# Patient Record
Sex: MALE | Race: WHITE
[De-identification: names, ages, dates, MRNs, and addresses within clinical notes are randomized per-mention and may not be internally consistent; named-entity substitution may affect disease eponyms.]

---

## 2019-02-18 NOTE — HP
DATE OF SURGERY:  02/21/2019



ANTICIPATED PROCEDURE:  Colonoscopy.



HISTORY OF PRESENT ILLNESS: The patient presents for screening, 58 years old. He had one 
about 10 years ago that was negative.  



PAST MEDICAL HISTORY: Hypertension. CVA. Dysphagia.  

ALLERGIES: NONE.

MEDICATIONS:  Multiple. 



SOCIAL HISTORY: Negative.  

FAMILY HISTORY: Negative.



REVIEW OF SYSTEMS: Negative.



PHYSICAL EXAMINATION:  

VITAL SIGNS: Normal.

CHEST:  Clear.

COR: Regular.

ABDOMEN:  No palpable organomegaly or mass.



PLAN: Colonoscopy for screening.

## 2019-02-21 ENCOUNTER — HOSPITAL ENCOUNTER (OUTPATIENT)
Dept: HOSPITAL 33 - SDC | Age: 59
Discharge: HOME | End: 2019-02-21
Attending: SURGERY
Payer: MEDICARE

## 2019-02-21 VITALS — HEART RATE: 76 BPM | SYSTOLIC BLOOD PRESSURE: 141 MMHG | DIASTOLIC BLOOD PRESSURE: 79 MMHG

## 2019-02-21 VITALS — OXYGEN SATURATION: 96 %

## 2019-02-21 DIAGNOSIS — K63.5: ICD-10-CM

## 2019-02-21 DIAGNOSIS — Z12.11: Primary | ICD-10-CM

## 2019-02-21 PROCEDURE — 88305 TISSUE EXAM BY PATHOLOGIST: CPT

## 2019-02-21 NOTE — OP
SURGERY DATE/TIME:   02/21/2019  1142



PREOPERATIVE DIAGNOSIS:     Ten year screening.



POSTOPERATIVE DIAGNOSIS:   One polyp mid sigmoid. 



PROCEDURE:    Colonoscopy complete to cecum with hot polypectomy x1. 



SURGEON:        Ac Martinez M.D.



ANESTHESIA:    MAC - Phillip Rod CRNA. 



COMPLICATIONS:    None.



CONDITION:        Stable.



INDICATION:  A patient requiring evaluation.   



DESCRIPTION OF PROCEDURE: Taken to endoscopy. MAC sedation. Excellent anesthesia present. 
Anal digital examination satisfactory. Prostate satisfactory. Scope advanced to the cecum. 
Base of cecum, ileocecal valve and appendiceal orifice were all normal. Ascending, 
hepatic, transverse, splenic, descending, sigmoid. Mid sigmoid 8 mm polyp taken with hot 
biopsy forceps to extinction. Rectum and anus normal. 



IMPRESSION:  Successful polypectomy x1. 



PLAN: Follow up three years. He can call for report on his polyp in five days.

## 2019-08-10 ENCOUNTER — HOSPITAL ENCOUNTER (OUTPATIENT)
Dept: HOSPITAL 33 - ED | Age: 59
Setting detail: OBSERVATION
LOS: 3 days | Discharge: HOME | End: 2019-08-13
Attending: GENERAL PRACTICE | Admitting: GENERAL PRACTICE
Payer: MEDICARE

## 2019-08-10 DIAGNOSIS — E87.6: ICD-10-CM

## 2019-08-10 DIAGNOSIS — K92.2: Primary | ICD-10-CM

## 2019-08-10 DIAGNOSIS — Z72.0: ICD-10-CM

## 2019-08-10 DIAGNOSIS — K44.9: ICD-10-CM

## 2019-08-10 DIAGNOSIS — R10.33: ICD-10-CM

## 2019-08-10 DIAGNOSIS — K25.9: ICD-10-CM

## 2019-08-10 DIAGNOSIS — I10: ICD-10-CM

## 2019-08-10 DIAGNOSIS — K29.70: ICD-10-CM

## 2019-08-10 DIAGNOSIS — R19.7: ICD-10-CM

## 2019-08-10 DIAGNOSIS — J44.1: ICD-10-CM

## 2019-08-10 DIAGNOSIS — Z86.73: ICD-10-CM

## 2019-08-10 DIAGNOSIS — Z79.899: ICD-10-CM

## 2019-08-10 DIAGNOSIS — F10.929: ICD-10-CM

## 2019-08-10 LAB
ALBUMIN SERPL-MCNC: 1.4 G/DL (ref 3.5–5)
ALP SERPL-CCNC: 29 U/L (ref 38–126)
ALT SERPL-CCNC: 12 U/L (ref 0–50)
AMYLASE SERPL-CCNC: 39 U/L (ref 30–110)
ANION GAP SERPL CALC-SCNC: 5.1 MEQ/L (ref 5–15)
APAP SPEC-MCNC: < 10 UG/ML (ref 10–30)
AST SERPL QL: 13 U/L (ref 17–59)
BASOPHILS # BLD AUTO: 0.03 10*3/UL (ref 0–0.4)
BASOPHILS NFR BLD AUTO: 0.5 % (ref 0–0.4)
BILIRUB BLD-MCNC: 0.2 MG/DL (ref 0.2–1.3)
BUN SERPL-MCNC: 4 MG/DL (ref 9–20)
CALCIUM SPEC-MCNC: 3.5 MG/DL (ref 8.4–10.2)
CHLORIDE SERPL-SCNC: 127 MMOL/L (ref 98–107)
CO2 SERPL-SCNC: 10 MMOL/L (ref 22–30)
CREAT SERPL-MCNC: 0.26 MG/DL (ref 0.66–1.25)
EOSINOPHIL # BLD AUTO: 0.07 10*3/UL (ref 0–0.5)
ETHANOL SERPL-MCNC: 113 MG/DL (ref 0–10)
GLUCOSE SERPL-MCNC: 74 MG/DL (ref 74–106)
GRANULOCYTES # BLD AUTO: 3.66 10*3/UL (ref 1.4–6.9)
HCT VFR BLD AUTO: 32.5 % (ref 42–50)
HGB BLD-MCNC: 11.1 GM/DL (ref 12.5–18)
LIPASE SERPL-CCNC: 76 U/L (ref 23–300)
LYMPHOCYTES # SPEC AUTO: 2.2 10*3/UL (ref 1–4.6)
MCH RBC QN AUTO: 31.4 PG (ref 26–32)
MCHC RBC AUTO-ENTMCNC: 34.2 G/DL (ref 32–36)
MONOCYTES # BLD AUTO: 0.46 10*3/UL (ref 0–1.3)
NEUTROPHILS NFR BLD AUTO: 56.9 % (ref 36–66)
PLATELET # BLD AUTO: 123 K/MM3 (ref 150–450)
POTASSIUM SERPLBLD-SCNC: 1.8 MMOL/L (ref 3.5–5.1)
PROT SERPL-MCNC: 3.2 G/DL (ref 6.3–8.2)
RBC # BLD AUTO: 3.53 M/MM3 (ref 4.1–5.6)
SALICYLATES SERPL-MCNC: < 1 MG/DL (ref 2–20)
SODIUM SERPL-SCNC: 141 MMOL/L (ref 137–145)
WBC # BLD AUTO: 6.4 K/MM3 (ref 4–10.5)

## 2019-08-10 PROCEDURE — 93005 ELECTROCARDIOGRAM TRACING: CPT

## 2019-08-10 PROCEDURE — 82140 ASSAY OF AMMONIA: CPT

## 2019-08-10 PROCEDURE — 83690 ASSAY OF LIPASE: CPT

## 2019-08-10 PROCEDURE — 80053 COMPREHEN METABOLIC PANEL: CPT

## 2019-08-10 PROCEDURE — 82150 ASSAY OF AMYLASE: CPT

## 2019-08-10 PROCEDURE — 90791 PSYCH DIAGNOSTIC EVALUATION: CPT

## 2019-08-10 PROCEDURE — G0480 DRUG TEST DEF 1-7 CLASSES: HCPCS

## 2019-08-10 PROCEDURE — 94762 N-INVAS EAR/PLS OXIMTRY CONT: CPT

## 2019-08-10 PROCEDURE — 99284 EMERGENCY DEPT VISIT MOD MDM: CPT

## 2019-08-10 PROCEDURE — 96360 HYDRATION IV INFUSION INIT: CPT

## 2019-08-10 PROCEDURE — 83735 ASSAY OF MAGNESIUM: CPT

## 2019-08-10 PROCEDURE — 82271 OCCULT BLOOD OTHER SOURCES: CPT

## 2019-08-10 PROCEDURE — 43239 EGD BIOPSY SINGLE/MULTIPLE: CPT

## 2019-08-10 PROCEDURE — 96365 THER/PROPH/DIAG IV INF INIT: CPT

## 2019-08-10 PROCEDURE — 81001 URINALYSIS AUTO W/SCOPE: CPT

## 2019-08-10 PROCEDURE — 87507 IADNA-DNA/RNA PROBE TQ 12-25: CPT

## 2019-08-10 PROCEDURE — 36000 PLACE NEEDLE IN VEIN: CPT

## 2019-08-10 PROCEDURE — 80048 BASIC METABOLIC PNL TOTAL CA: CPT

## 2019-08-10 PROCEDURE — G0378 HOSPITAL OBSERVATION PER HR: HCPCS

## 2019-08-10 PROCEDURE — 93268 ECG RECORD/REVIEW: CPT

## 2019-08-10 PROCEDURE — 83605 ASSAY OF LACTIC ACID: CPT

## 2019-08-10 PROCEDURE — 96374 THER/PROPH/DIAG INJ IV PUSH: CPT

## 2019-08-10 PROCEDURE — 85025 COMPLETE CBC W/AUTO DIFF WBC: CPT

## 2019-08-10 PROCEDURE — 94760 N-INVAS EAR/PLS OXIMETRY 1: CPT

## 2019-08-10 PROCEDURE — 70450 CT HEAD/BRAIN W/O DYE: CPT

## 2019-08-10 PROCEDURE — 94640 AIRWAY INHALATION TREATMENT: CPT

## 2019-08-10 PROCEDURE — 96366 THER/PROPH/DIAG IV INF ADDON: CPT

## 2019-08-10 PROCEDURE — 84484 ASSAY OF TROPONIN QUANT: CPT

## 2019-08-10 PROCEDURE — 36415 COLL VENOUS BLD VENIPUNCTURE: CPT

## 2019-08-10 PROCEDURE — 84132 ASSAY OF SERUM POTASSIUM: CPT

## 2019-08-10 PROCEDURE — 80307 DRUG TEST PRSMV CHEM ANLYZR: CPT

## 2019-08-10 NOTE — ERPHSYRPT
- History of Present Illness


Time Seen by Provider: 08/10/19 23:38


Source: patient, EMS


Exam Limitations: clinical condition


Patient Subjective Stated Complaint: per ems they were called out for pt passed 

out on the floor an unresponsive. states pt was awake and alert when they 

arrived. is intoxicated and vomiting.


Triage Nursing Assessment: pt awake and answers questions approp. alert and 

oriented x3. pt arrive per ambulance and complete assist to stretcher. 

respirations nonlabored. pt vomiting on arrival. abd nontender with bowel 

sounds present x4, speech slurred. severe weakness noted to rt side. pt and ems 

states is his norm after cva.


Physician History: 





pt was passed out after apparent drinking binge today- prior CVA , but pt 

states now no new weakness or symptoms; no focal deficits that are new on exam 

- residula right sided weakness from prior CVA; on blood thinner and will need 

CT head however; 


Timing/Duration: today


Severity: moderate


Associated Symptoms: nausea, vomiting


Allergies/Adverse Reactions: 








No Known Drug Allergies Allergy (Verified 08/10/19 23:16)


 





Home Medications: 








Aspirin  mg*** [Ecotrin 325 MG***] 325 mg PO DAILY 01/24/16 [History]


Acetaminophen [Tylenol Extra Strength] 500 mg PO Q4HPRN PRN 01/11/18 [History]


Atorvastatin Calcium [Lipitor] 80 mg PO DAILY 01/11/18 [History]


Lisinopril 5 mg*** [Zestril 5 MG***] 5 mg PO DAILY 01/11/18 [History]


Sertraline HCl 50 mg** [Zoloft 50 mg Tablet**] 100 mg PO DAILY 01/11/18 [History

]


Tamsulosin HCl 0.4 mg*** [Flomax 0.4 MG***] 0.4 mg PO DAILY 01/11/18 [History]


Baclofen 10 mg*** [Lioresal 10 mg***] 10 mg PO TID 08/10/19 [History]





Hx Tetanus, Diphtheria Vaccination/Date Given: Yes (2018)


Hx Influenza Vaccination/Date Given: No


Hx Pneumococcal Vaccination/Date Given: No


Immunizations Up to Date: Yes





- Review of Systems


Constitutional: No Fever, No Chills


Eyes: No Symptoms


Ears, Nose, & Throat: No Symptoms


Respiratory: No Cough, No Dyspnea


Cardiac: No Chest Pain, No Edema, No Syncope


Abdominal/Gastrointestinal: Nausea, Vomiting, Other (trace positive emesis), No 

Abdominal Pain, No Diarrhea


Genitourinary Symptoms: No Dysuria


Musculoskeletal: No Back Pain, No Neck Pain


Skin: No Rash


Neurological: No Dizziness, No Focal Weakness (old rigth weakness without 

change - none new ), No Sensory Changes


Psychological: Alcohol Abuse


Endocrine: No Symptoms


Hematologic/Lymphatic: No Symptoms


Immunological/Allergic: No Symptoms


All Other Systems: Reviewed and Negative





- Past Medical History


Pertinent Past Medical History: Yes


Neurological History: Stroke


ENT History: No Pertinent History


Cardiac History: High Cholesterol, Hypertension


Respiratory History: No Pertinent History


Endocrine Medical History: No Pertinent History


Musculoskeletal History: Fractures


GI Medical History: No Pertinent History


 History: No Pertinent History


Psycho-Social History: No Pertinent History


Male Reproductive Disorders: Prostate Problems


Other Medical History: REPORTS RHUEMATIC FEVER AND HEPITITIS WHEN HE WAS YOUNG 

BUT OTHERISE NONSIGNIFICANT.





- Past Surgical History


Past Surgical History: Yes


Neuro Surgical History: No Pertinent History


Cardiac: No Pertinent History


Respiratory: No Pertinent History


Gastrointestinal: No Pertinent History


Genitourinary: No Pertinent History


Musculoskeletal: Orthopedic Surgery


Male Surgical History: No Pertinent History


Other Surgical History: Colonoscopy





- Social History


Smoking Status: Current every day smoker


How long have you smoked: 30 years


Exposure to second hand smoke: Yes


Drug Use: none


Patient Lives Alone: Yes





- Nursing Vital Signs


Nursing Vital Signs: 


 Initial Vital Signs











Temperature  98.1 F   08/10/19 22:59


 


Pulse Rate  81   08/10/19 22:59


 


Respiratory Rate  18   08/10/19 22:59


 


Blood Pressure  127/89   08/10/19 22:59


 


O2 Sat by Pulse Oximetry  94 L  08/10/19 22:59








 Pain Scale











Pain Intensity                 0

















- Physical Exam


General Appearance: no apparent distress, alert


Eye Exam: PERRL/EOMI, eyes nml inspection


Ears, Nose, Throat Exam: normal ENT inspection, TMs normal, pharynx normal, 

moist mucous membranes


Neck Exam: normal inspection, non-tender, supple, full range of motion


Respiratory Exam: normal breath sounds, lungs clear, No respiratory distress


Cardiovascular Exam: regular rate/rhythm, normal heart sounds, normal 

peripheral pulses


Gastrointestinal/Abdomen Exam: soft, normal bowel sounds, No tenderness, No mass


Rectal Exam: deferred


Back Exam: normal inspection, normal range of motion, No CVA tenderness, No 

vertebral tenderness


Extremity Exam: normal inspection, normal range of motion, pelvis stable


Neurologic Exam: alert, oriented x 3, cooperative, normal mood/affect, nml 

cerebellar function, nml station & gait, sensation nml, No motor deficits


Skin Exam: normal color, warm, dry, No rash


Lymphatic Exam: No adenopathy


SpO2: 94





- Course


Nursing assessment & vital signs reviewed: Yes


EKG Interpreted by Me: Sinus Rhythm, NORMAL AXIS, NORMAL INTERVALS, Non-

specific ST Changes





- CT Exams


  ** Head


CT Interpretation: Tele-radiologist Report, No/Intracranial Hemorrhag, Old 

Stroke, Other


Ordered Tests: 


 Active Orders 24 hr











 Category Date Time Status


 


 Clean Catch Urine Specimen STAT Care  08/10/19 23:02 Active


 


 EKG-ER Only STAT Care  08/10/19 23:02 Active


 


 IV Insertion STAT Care  08/10/19 23:09 Active


 


 Pulse Oximetry (ED) STAT Care  08/10/19 23:02 Active


 


 HEAD WITHOUT CONTRAST [CT] Stat Exams  08/10/19 23:01 Taken


 


 ACETAMINOPHEN Stat Lab  08/10/19 23:13 Completed


 


 AMYLASE Stat Lab  08/10/19 23:13 Completed


 


 CBC W DIFF Stat Lab  08/10/19 23:13 Completed


 


 CMP Stat Lab  08/10/19 23:13 Completed


 


 ETHYL ALCOHOL Stat Lab  08/10/19 23:13 Completed


 


 LIPASE Stat Lab  08/10/19 23:13 Completed


 


 Lactic Acid Stat Lab  08/10/19 23:11 Completed


 


 OCCULT BLOOD, EMESIS Stat Lab  08/10/19 23:09 Completed


 


 SALICYLATE Stat Lab  08/10/19 23:13 Completed


 


 TROPONIN Q3H Lab  08/10/19 23:13 Completed


 


 TROPONIN Q3H Lab  08/11/19 02:15 Ordered


 


 TROPONIN Q3H Lab  08/11/19 05:15 Ordered


 


 TROPONIN Q3H Lab  08/11/19 08:15 Ordered


 


 TROPONIN Q3H Lab  08/11/19 11:15 Ordered


 


 UA W/RFX UR CULTURE Stat Lab  08/11/19 02:12 Received


 


 Urine Triage Profile Stat Lab  08/11/19 02:12 Received








Medication Summary











Generic Name Dose Route Start Last Admin





  Trade Name Freq  PRN Reason Stop Dose Admin


 


Potassium Chloride  20 meq in 100 mls @ 50 mls/hr  08/10/19 23:45  08/11/19 00:

36





  Potassium Chloride 20 Meq In Water 100ml  IV  08/11/19 03:44  50 mls/hr





  Q2H ROSA   Administration





     





     





     





     














Discontinued Medications














Generic Name Dose Route Start Last Admin





  Trade Name Brianna  PRN Reason Stop Dose Admin


 


Famotidine  20 mg  08/10/19 23:05  08/10/19 23:14





  Pepcid 20 Mg Vial***  IV  08/10/19 23:06  20 mg





  STAT ONE   Administration





     





     





     





     


 


Famotidine  Confirm  08/10/19 23:10  





  Pepcid 20 Mg Vial***  Administered  08/10/19 23:11  





  Dose   





  20 mg   





  IV   





  .STK-MED ONE   





     





     





     





     


 


Sodium Chloride  1,000 mls @ 999 mls/hr  08/10/19 23:02  08/11/19 00:40





  Sodium Chloride 0.9% 1000 Ml  IV  08/11/19 00:02  Infused





  .Q1H1M STA   Infusion





     





     





     





     


 


Sodium Chloride  Confirm  08/10/19 23:10  





  Sodium Chloride 0.9% 1000 Ml  Administered  08/10/19 23:11  





  Dose   





  1,000 mls @ ud   





  .ROUTE   





  .STK-MED ONE   





     





     





     





     


 


Sodium Chloride  Confirm  08/11/19 00:28  





  Sodium Chloride 0.9% 1000 Ml  Administered  08/11/19 00:29  





  Dose   





  1,000 mls @ ud   





  .ROUTE   





  .STK-MED ONE   





     





     





     





     











Lab/Rad Data: 


 Laboratory Result Diagrams





 08/10/19 23:13 





 08/10/19 23:13 





 Laboratory Results











  08/10/19 08/10/19 08/10/19 Range/Units





  23:13 23:13 23:13 


 


WBC    6.4  (4.0-10.5)  K/mm3


 


RBC    3.53 L  (4.1-5.6)  M/mm3


 


Hgb    11.1 L  (12.5-18.0)  gm/dl


 


Hct    32.5 L  (42-50)  %


 


MCV    92.1  ()  fl


 


MCH    31.4  (26-32)  pg


 


MCHC    34.2  (32-36)  g/dl


 


RDW    14.5 H  (11.5-14.0)  %


 


Plt Count    123 L  (150-450)  K/mm3


 


MPV    10.8 H  (6-9.5)  fl


 


Gran %    56.9  (36.0-66.0)  %


 


Eos # (Auto)    0.07  (0-0.5)  


 


Absolute Lymphs (auto)    2.20  (1.0-4.6)  


 


Absolute Monos (auto)    0.46  (0.0-1.3)  


 


Lymphocytes %    34.3  (24.0-44.0)  %


 


Monocytes %    7.2  (0.0-12.0)  %


 


Eosinophils %    1.1  (0.00-5.0)  %


 


Basophils %    0.5  (0.0-0.4)  %


 


Absolute Granulocytes    3.66  (1.4-6.9)  


 


Basophils #    0.03  (0-0.4)  


 


Sodium   141   (137-145)  mmol/L


 


Potassium   1.8 L*   (3.5-5.1)  mmol/L


 


Chloride   127 H   ()  mmol/L


 


Carbon Dioxide   10 L*   (22-30)  mmol/L


 


Anion Gap   5.1   (5-15)  MEQ/L


 


BUN   4 L   (9-20)  mg/dL


 


Creatinine   0.26 L   (0.66-1.25)  mg/dL


 


Estimated GFR   > 60.0   ML/MIN


 


Glucose   74   ()  mg/dL


 


Lactic Acid     (0.4-2.0)  


 


Calcium   3.5 L*   (8.4-10.2)  mg/dL


 


Total Bilirubin   0.20   (0.2-1.3)  mg/dL


 


AST   13 L   (17-59)  U/L


 


ALT   12   (0-50)  U/L


 


Alkaline Phosphatase   29 L   ()  U/L


 


Troponin I  < 0.012    (0.000-0.034)  ng/mL


 


Serum Total Protein   3.2 L   (6.3-8.2)  g/dL


 


Albumin   1.4 L   (3.5-5.0)  g/dL


 


Amylase   39   ()  U/L


 


Lipase   76   ()  U/L


 


Emesis for Blood     (Negative)  


 


Salicylates   < 1.0 L   (2-20)  mg/dL


 


Acetaminophen   < 10 L   (10-30)  ug/ml


 


Ethyl Alcohol   113 H   (0-10)  mg/dL














  08/10/19 08/10/19 Range/Units





  23:11 23:09 


 


WBC    (4.0-10.5)  K/mm3


 


RBC    (4.1-5.6)  M/mm3


 


Hgb    (12.5-18.0)  gm/dl


 


Hct    (42-50)  %


 


MCV    ()  fl


 


MCH    (26-32)  pg


 


MCHC    (32-36)  g/dl


 


RDW    (11.5-14.0)  %


 


Plt Count    (150-450)  K/mm3


 


MPV    (6-9.5)  fl


 


Gran %    (36.0-66.0)  %


 


Eos # (Auto)    (0-0.5)  


 


Absolute Lymphs (auto)    (1.0-4.6)  


 


Absolute Monos (auto)    (0.0-1.3)  


 


Lymphocytes %    (24.0-44.0)  %


 


Monocytes %    (0.0-12.0)  %


 


Eosinophils %    (0.00-5.0)  %


 


Basophils %    (0.0-0.4)  %


 


Absolute Granulocytes    (1.4-6.9)  


 


Basophils #    (0-0.4)  


 


Sodium    (137-145)  mmol/L


 


Potassium    (3.5-5.1)  mmol/L


 


Chloride    ()  mmol/L


 


Carbon Dioxide    (22-30)  mmol/L


 


Anion Gap    (5-15)  MEQ/L


 


BUN    (9-20)  mg/dL


 


Creatinine    (0.66-1.25)  mg/dL


 


Estimated GFR    ML/MIN


 


Glucose    ()  mg/dL


 


Lactic Acid  1.7   (0.4-2.0)  


 


Calcium    (8.4-10.2)  mg/dL


 


Total Bilirubin    (0.2-1.3)  mg/dL


 


AST    (17-59)  U/L


 


ALT    (0-50)  U/L


 


Alkaline Phosphatase    ()  U/L


 


Troponin I    (0.000-0.034)  ng/mL


 


Serum Total Protein    (6.3-8.2)  g/dL


 


Albumin    (3.5-5.0)  g/dL


 


Amylase    ()  U/L


 


Lipase    ()  U/L


 


Emesis for Blood   POSITIVE  (Negative)  


 


Salicylates    (2-20)  mg/dL


 


Acetaminophen    (10-30)  ug/ml


 


Ethyl Alcohol    (0-10)  mg/dL














- Progress


Progress: improved, re-examined


Progress Note: 





08/11/19 02:13


discussed with Dr. Nunes and pt and all agree best to come in and have K 

restored ; also to control vomiting and monitor for GI bleed


Discussed with : Manju


Will see patient in: hospital (observation)


Counseled pt/family regarding: lab results, diagnosis, need for follow-up, rad 

results





- Departure


Departure Disposition: Observation


Clinical Impression: 


 Hypokalemia due to excessive gastrointestinal loss of potassium, minor GI bleed

, Alcohol intoxication, old CVA





Condition: Good


Critical Care Time: No


Referrals: 


GUERA FIELDS [Primary Care Provider] -

## 2019-08-11 LAB
AMPHETAMINES UR QL: NEGATIVE
BARBITURATES UR QL: NEGATIVE
BENZODIAZ UR QL SCN: NEGATIVE
COCAINE UR QL SCN: NEGATIVE
GLUCOSE UR-MCNC: NEGATIVE MG/DL
METHADONE UR QL: NEGATIVE
OPIATES UR QL: NEGATIVE
PCP UR QL CFM>20 NG/ML: NEGATIVE
PROT UR STRIP-MCNC: NEGATIVE MG/DL
RBC #/AREA URNS HPF: (no result) /HPF (ref 0–2)
THC UR QL SCN: POSITIVE
WBC #/AREA URNS HPF: (no result) /HPF (ref 0–5)

## 2019-08-11 RX ADMIN — POTASSIUM CHLORIDE SCH MLS/HR: 14.9 INJECTION, SOLUTION INTRAVENOUS at 00:36

## 2019-08-11 RX ADMIN — SERTRALINE SCH MG: 50 TABLET, FILM COATED ORAL at 11:28

## 2019-08-11 RX ADMIN — PANTOPRAZOLE SODIUM SCH MG: 40 INJECTION, POWDER, FOR SOLUTION INTRAVENOUS at 10:50

## 2019-08-11 RX ADMIN — TAMSULOSIN HYDROCHLORIDE SCH MG: 0.4 CAPSULE ORAL at 11:29

## 2019-08-11 RX ADMIN — FAMOTIDINE SCH MG: 10 INJECTION INTRAVENOUS at 21:19

## 2019-08-11 RX ADMIN — POTASSIUM CHLORIDE SCH MLS/HR: 14.9 INJECTION, SOLUTION INTRAVENOUS at 02:24

## 2019-08-11 RX ADMIN — AZITHROMYCIN DIHYDRATE SCH MG: 250 TABLET, FILM COATED ORAL at 13:06

## 2019-08-11 RX ADMIN — FAMOTIDINE SCH MG: 10 INJECTION INTRAVENOUS at 10:48

## 2019-08-11 RX ADMIN — CEFTRIAXONE SCH MLS/HR: 1 INJECTION, SOLUTION INTRAVENOUS at 13:06

## 2019-08-11 RX ADMIN — SIMVASTATIN SCH MG: 20 TABLET, FILM COATED ORAL at 11:28

## 2019-08-11 RX ADMIN — LISINOPRIL SCH MG: 5 TABLET ORAL at 11:29

## 2019-08-11 NOTE — XRAY
Indication: Loss of consciousness with fall.



Multiple contiguous axial images obtained through the head without contrast.



Comparison: January 24, 2016.



Stable large left frontoparietal old infarct.  No acute intracranial

hemorrhage, hydrocephalus, or mass effect.  Bony calvarium intact.  Minimal

mucosal thickening both ethmoid sinuses, left greater than right.  Mastoid air

cells are clear.



Impression:

1.  Stable old left frontoparietal infarct.

2.  No acute intracranial abnormalities.

3.  Minimal paranasal sinus disease.



Comment: Preliminary interpretation was made by [facility].  No discrepancy.



CTDI 45.41

## 2019-08-11 NOTE — PCM.HP
History of Present Illness





- Chief Complaint


Chief Complaint: Mild GI bleed w/vomiting, alcohol overuse, hypokalemia


History of Present Illness: 


 is a very pleasant 58 year old male pt of Dr. Sawant with PMHx CVA, 

HTN, and tobacco abuse who was brought to ER last night by EMS.  Per the ER note

, they were called for an unresponsive pt.  He was awake however when they came 

to evaluate him.  He was found intoxicated and vomiting.  He also vomited in 

the ER and his brown vomitus was tested and found to be heme-positive.  His 

alcohol level was 110 (nl 0-10) and his initial potassium was 1.8 (now 4.3).  

His platelets in ER were 123. Hgb 11.1. 





Pt tells me he remembers last night, that he "fell down" after drinking 4-5 

"tall boys" (larger sized beers).  He said he drinks about once a week but last 

night drank more than usual.


He denies vomiting since coming to med surg.  He states he's been sick the past 

3d with vomiting and diarrhea, unsure about fever.  He c/o periumbilical abd 

pain, 7-8/10, non radiating, which he can't characterize.  It is constant.  He 

took nyquil with some relief.





This morning he is feeling better. 





He had a 10 year screening colonoscopy in March 2019 by Dr. Martinez, which 

resulted in polypectomy x 1.





- Review of Systems


Constitutional: Fatigue, No Fever (pt is unsure)


Respiratory: Cough (prod x 3-4d), No Short Of Breath


Cardiac: Edema (RLE edema chronic), No Chest Pain


Abdominal/Gastrointestinal: Abdominal Pain, Vomiting, Diarrhea (x3d), No 

Hematochezia, No Melena


Genitourinary Symptoms: No Dysuria, No Hematuria


Neurological: Dizziness (occ), Other (syncope)


Psychological: Depression (chronic d/t CVA with R sided deficit), No Anxiety, 

No Suicidal Ideations, No Homicidal Ideations


All Other Systems: Reviewed and Negative





Medications & Allergies


Home Medications: 


 Home Medication List





Atorvastatin Calcium [Lipitor] 80 mg PO DAILY 01/11/18 [History Confirmed 08/10/

19]


Lisinopril 5 mg*** [Zestril 5 MG***] 5 mg PO DAILY 01/11/18 [History Confirmed 

08/10/19]


Sertraline HCl 50 mg** [Zoloft 50 mg Tablet**] 100 mg PO DAILY 01/11/18 [

History Confirmed 08/10/19]


Tamsulosin HCl 0.4 mg*** [Flomax 0.4 MG***] 0.4 mg PO DAILY 01/11/18 [History 

Confirmed 08/10/19]


Baclofen 10 mg*** [Lioresal 10 mg***] 10 mg PO TID 08/10/19 [History Confirmed 

08/10/19]








Allergies/Adverse Reactions: 


 Allergies











Allergy/AdvReac Type Severity Reaction Status Date / Time


 


No Known Drug Allergies Allergy   Verified 08/10/19 23:16














- Past Medical History


Past Medical History: Yes


Neurological History: Stroke


ENT History: No Pertinent History


Cardiac History: High Cholesterol, Hypertension


Respiratory History: No Pertinent History


Endocrine Medical History: No Pertinent History


Musculoskelatal History: Fractures


GI Medical History: No Pertinent History


 History: No Pertinent History


Pyscho-Social History: No Pertinent History


Male Reproductive Disorders: Prostate Problems


Comment: REPORTS RHUEMATIC FEVER AND HEPITITIS WHEN HE WAS YOUNG BUT OTHERISE 

NONSIGNIFICANT.





- Past Surgical History


Past Surgical History: Yes


Neuro Surgical History: No Pertinent History


Cardiac History: No Pertinent History


Respiratory Surgery: No Pertinent History


GI Surgical History: No Pertinent History


Genitourinary Surgical Hx: No Pertinent History


Musculskeletal Surgical Hx: Orthopedic Surgery


Male Surgical History: No Pertinent History


Other Surgical History: Colonoscopy





- Social History


Smoking Status: Current every day smoker


How long have you smoked: 30 years


Exposure to second hand smoke: Yes


Alcohol: Occasionally, Weekly


Drug Use: marijuana





- Physical Exam


Vital Signs: 


 Vital Signs - 24 hr











  Temp Pulse Resp BP Pulse Ox


 


 08/11/19 12:00  98.3 F  93 H  20  120/78  94 L


 


 08/11/19 08:00  98.2 F  93 H  20  118/74  93 L


 


 08/11/19 07:00   101 H  16   95


 


 08/11/19 05:28  97.5 F  84  20  117/77  95


 


 08/11/19 05:15      95


 


 08/11/19 05:07   88  20   95


 


 08/11/19 04:25      95


 


 08/11/19 04:04   94 H  18  101/77  95


 


 08/11/19 03:04   91 H  16  124/82  94 L


 


 08/11/19 02:17      94 L


 


 08/10/19 23:09      94 L


 


 08/10/19 22:59  98.1 F  81  18  127/89  94 L











General Appearance: no apparent distress, alert, obese


Neurologic Exam: oriented x 3, cooperative, normal mood/affect


Eye Exam: eyes nml inspection


Ears, Nose, Throat Exam: moist mucous membranes


Neck Exam: normal inspection, non-tender, No lymphadenopathy


Respiratory Exam: diminished breath sounds, prolonged expirations, No crackles/

rales, No rhonchi, No wheezing


Cardiovascular Exam: normal heart sounds, irregular, No murmur


Gastrointestinal/Abdomen Exam: soft, tenderness (suprapubic), No normal bowel 

sounds (hyperactive), No mass, No guarding, No rebound


Back Exam: normal inspection, No rash


Extremity Exam: No pedal edema, No swelling


Skin Exam: warm, dry, other (face with generalized erythema), No rash





Results





- Labs


Lab/Micro Results: 


 Lab Results-Last 24 Hours











  08/10/19 08/10/19 08/10/19 Range/Units





  23:09 23:11 23:13 


 


WBC    6.4  (4.0-10.5)  K/mm3


 


RBC    3.53 L  (4.1-5.6)  M/mm3


 


Hgb    11.1 L  (12.5-18.0)  gm/dl


 


Hct    32.5 L  (42-50)  %


 


MCV    92.1  ()  fl


 


MCH    31.4  (26-32)  pg


 


MCHC    34.2  (32-36)  g/dl


 


RDW    14.5 H  (11.5-14.0)  %


 


Plt Count    123 L  (150-450)  K/mm3


 


MPV    10.8 H  (6-9.5)  fl


 


Gran %    56.9  (36.0-66.0)  %


 


Eos # (Auto)    0.07  (0-0.5)  


 


Absolute Lymphs (auto)    2.20  (1.0-4.6)  


 


Absolute Monos (auto)    0.46  (0.0-1.3)  


 


Lymphocytes %    34.3  (24.0-44.0)  %


 


Monocytes %    7.2  (0.0-12.0)  %


 


Eosinophils %    1.1  (0.00-5.0)  %


 


Basophils %    0.5  (0.0-0.4)  %


 


Absolute Granulocytes    3.66  (1.4-6.9)  


 


Basophils #    0.03  (0-0.4)  


 


Sodium     (137-145)  mmol/L


 


Potassium     (3.5-5.1)  mmol/L


 


Chloride     ()  mmol/L


 


Carbon Dioxide     (22-30)  mmol/L


 


Anion Gap     (5-15)  MEQ/L


 


BUN     (9-20)  mg/dL


 


Creatinine     (0.66-1.25)  mg/dL


 


Estimated GFR     ML/MIN


 


Glucose     ()  mg/dL


 


Lactic Acid   1.7   (0.4-2.0)  


 


Calcium     (8.4-10.2)  mg/dL


 


Total Bilirubin     (0.2-1.3)  mg/dL


 


AST     (17-59)  U/L


 


ALT     (0-50)  U/L


 


Alkaline Phosphatase     ()  U/L


 


Troponin I     (0.000-0.034)  ng/mL


 


Serum Total Protein     (6.3-8.2)  g/dL


 


Albumin     (3.5-5.0)  g/dL


 


Amylase     ()  U/L


 


Lipase     ()  U/L


 


Urine Color     (YELLOW)  


 


Urine Appearance     (CLEAR)  


 


Urine pH     (5-6)  


 


Ur Specific Gravity     (1.005-1.025)  


 


Urine Protein     (Negative)  


 


Urine Ketones     (NEGATIVE)  


 


Urine Blood     (0-5)  Blake/ul


 


Urine Nitrite     (NEGATIVE)  


 


Urine Bilirubin     (NEGATIVE)  


 


Urine Urobilinogen     (0-1)  mg/dL


 


Ur Leukocyte Esterase     (NEGATIVE)  


 


Urine WBC (Auto)     (0-5)  /HPF


 


Urine RBC (Auto)     (0-2)  /HPF


 


U Hyaline Cast (Auto)     (0-2)  /LPF


 


U Epithel Cells (Auto)     (FEW)  /HPF


 


Urine Bacteria (Auto)     (NEGATIVE)  /HPF


 


Other Casts (Auto)     (NEGATIVE)  /LPF


 


Urine Mucus (Auto)     (NEGATIVE)  /HPF


 


Urine Culture Reflexed     (NO)  


 


Urine Glucose     (NEGATIVE)  mg/dL


 


Emesis for Blood  POSITIVE    (Negative)  


 


Salicylates     (2-20)  mg/dL


 


Urine Opiates Level     (NEGATIVE)  


 


Ur Methadone     (NEGATIVE)  


 


Acetaminophen     (10-30)  ug/ml


 


Urine Barbiturates     (NEGATIVE)  


 


Ur Phencyclidine (PCP)     (NEGATIVE)  


 


Urine Amphetamine     (NEGATIVE)  


 


U Benzodiazepine Level     (NEGATIVE)  


 


Urine Cocaine     (NEGATIVE)  


 


Urine Marijuana (THC)     (NEGATIVE)  


 


Ethyl Alcohol     (0-10)  mg/dL














  08/10/19 08/10/19 08/11/19 Range/Units





  23:13 23:13 02:12 


 


WBC     (4.0-10.5)  K/mm3


 


RBC     (4.1-5.6)  M/mm3


 


Hgb     (12.5-18.0)  gm/dl


 


Hct     (42-50)  %


 


MCV     ()  fl


 


MCH     (26-32)  pg


 


MCHC     (32-36)  g/dl


 


RDW     (11.5-14.0)  %


 


Plt Count     (150-450)  K/mm3


 


MPV     (6-9.5)  fl


 


Gran %     (36.0-66.0)  %


 


Eos # (Auto)     (0-0.5)  


 


Absolute Lymphs (auto)     (1.0-4.6)  


 


Absolute Monos (auto)     (0.0-1.3)  


 


Lymphocytes %     (24.0-44.0)  %


 


Monocytes %     (0.0-12.0)  %


 


Eosinophils %     (0.00-5.0)  %


 


Basophils %     (0.0-0.4)  %


 


Absolute Granulocytes     (1.4-6.9)  


 


Basophils #     (0-0.4)  


 


Sodium  141    (137-145)  mmol/L


 


Potassium  1.8 L*    (3.5-5.1)  mmol/L


 


Chloride  127 H    ()  mmol/L


 


Carbon Dioxide  10 L*    (22-30)  mmol/L


 


Anion Gap  5.1    (5-15)  MEQ/L


 


BUN  4 L    (9-20)  mg/dL


 


Creatinine  0.26 L    (0.66-1.25)  mg/dL


 


Estimated GFR  > 60.0    ML/MIN


 


Glucose  74    ()  mg/dL


 


Lactic Acid     (0.4-2.0)  


 


Calcium  3.5 L*    (8.4-10.2)  mg/dL


 


Total Bilirubin  0.20    (0.2-1.3)  mg/dL


 


AST  13 L    (17-59)  U/L


 


ALT  12    (0-50)  U/L


 


Alkaline Phosphatase  29 L    ()  U/L


 


Troponin I   < 0.012   (0.000-0.034)  ng/mL


 


Serum Total Protein  3.2 L    (6.3-8.2)  g/dL


 


Albumin  1.4 L    (3.5-5.0)  g/dL


 


Amylase  39    ()  U/L


 


Lipase  76    ()  U/L


 


Urine Color    STRAW  (YELLOW)  


 


Urine Appearance    CLEAR  (CLEAR)  


 


Urine pH    5.0  (5-6)  


 


Ur Specific Gravity    1.005  (1.005-1.025)  


 


Urine Protein    NEGATIVE  (Negative)  


 


Urine Ketones    NEGATIVE  (NEGATIVE)  


 


Urine Blood    NEGATIVE  (0-5)  Blake/ul


 


Urine Nitrite    NEGATIVE  (NEGATIVE)  


 


Urine Bilirubin    NEGATIVE  (NEGATIVE)  


 


Urine Urobilinogen    NEGATIVE  (0-1)  mg/dL


 


Ur Leukocyte Esterase    NEGATIVE  (NEGATIVE)  


 


Urine WBC (Auto)    NONE  (0-5)  /HPF


 


Urine RBC (Auto)    NONE SEEN  (0-2)  /HPF


 


U Hyaline Cast (Auto)    0-2  (0-2)  /LPF


 


U Epithel Cells (Auto)    NONE  (FEW)  /HPF


 


Urine Bacteria (Auto)    NONE SEEN  (NEGATIVE)  /HPF


 


Other Casts (Auto)    NEGATIVE  (NEGATIVE)  /LPF


 


Urine Mucus (Auto)    SLIGHT  (NEGATIVE)  /HPF


 


Urine Culture Reflexed    NO  (NO)  


 


Urine Glucose    NEGATIVE  (NEGATIVE)  mg/dL


 


Emesis for Blood     (Negative)  


 


Salicylates  < 1.0 L    (2-20)  mg/dL


 


Urine Opiates Level     (NEGATIVE)  


 


Ur Methadone     (NEGATIVE)  


 


Acetaminophen  < 10 L    (10-30)  ug/ml


 


Urine Barbiturates     (NEGATIVE)  


 


Ur Phencyclidine (PCP)     (NEGATIVE)  


 


Urine Amphetamine     (NEGATIVE)  


 


U Benzodiazepine Level     (NEGATIVE)  


 


Urine Cocaine     (NEGATIVE)  


 


Urine Marijuana (THC)     (NEGATIVE)  


 


Ethyl Alcohol  113 H    (0-10)  mg/dL














  08/11/19 08/11/19 08/11/19 Range/Units





  02:12 02:27 06:30 


 


WBC     (4.0-10.5)  K/mm3


 


RBC     (4.1-5.6)  M/mm3


 


Hgb     (12.5-18.0)  gm/dl


 


Hct     (42-50)  %


 


MCV     ()  fl


 


MCH     (26-32)  pg


 


MCHC     (32-36)  g/dl


 


RDW     (11.5-14.0)  %


 


Plt Count     (150-450)  K/mm3


 


MPV     (6-9.5)  fl


 


Gran %     (36.0-66.0)  %


 


Eos # (Auto)     (0-0.5)  


 


Absolute Lymphs (auto)     (1.0-4.6)  


 


Absolute Monos (auto)     (0.0-1.3)  


 


Lymphocytes %     (24.0-44.0)  %


 


Monocytes %     (0.0-12.0)  %


 


Eosinophils %     (0.00-5.0)  %


 


Basophils %     (0.0-0.4)  %


 


Absolute Granulocytes     (1.4-6.9)  


 


Basophils #     (0-0.4)  


 


Sodium     (137-145)  mmol/L


 


Potassium     (3.5-5.1)  mmol/L


 


Chloride     ()  mmol/L


 


Carbon Dioxide     (22-30)  mmol/L


 


Anion Gap     (5-15)  MEQ/L


 


BUN     (9-20)  mg/dL


 


Creatinine     (0.66-1.25)  mg/dL


 


Estimated GFR     ML/MIN


 


Glucose     ()  mg/dL


 


Lactic Acid     (0.4-2.0)  


 


Calcium     (8.4-10.2)  mg/dL


 


Total Bilirubin     (0.2-1.3)  mg/dL


 


AST     (17-59)  U/L


 


ALT     (0-50)  U/L


 


Alkaline Phosphatase     ()  U/L


 


Troponin I   < 0.012  < 0.012  (0.000-0.034)  ng/mL


 


Serum Total Protein     (6.3-8.2)  g/dL


 


Albumin     (3.5-5.0)  g/dL


 


Amylase     ()  U/L


 


Lipase     ()  U/L


 


Urine Color     (YELLOW)  


 


Urine Appearance     (CLEAR)  


 


Urine pH     (5-6)  


 


Ur Specific Gravity     (1.005-1.025)  


 


Urine Protein     (Negative)  


 


Urine Ketones     (NEGATIVE)  


 


Urine Blood     (0-5)  Blake/ul


 


Urine Nitrite     (NEGATIVE)  


 


Urine Bilirubin     (NEGATIVE)  


 


Urine Urobilinogen     (0-1)  mg/dL


 


Ur Leukocyte Esterase     (NEGATIVE)  


 


Urine WBC (Auto)     (0-5)  /HPF


 


Urine RBC (Auto)     (0-2)  /HPF


 


U Hyaline Cast (Auto)     (0-2)  /LPF


 


U Epithel Cells (Auto)     (FEW)  /HPF


 


Urine Bacteria (Auto)     (NEGATIVE)  /HPF


 


Other Casts (Auto)     (NEGATIVE)  /LPF


 


Urine Mucus (Auto)     (NEGATIVE)  /HPF


 


Urine Culture Reflexed     (NO)  


 


Urine Glucose     (NEGATIVE)  mg/dL


 


Emesis for Blood     (Negative)  


 


Salicylates     (2-20)  mg/dL


 


Urine Opiates Level  NEGATIVE    (NEGATIVE)  


 


Ur Methadone  NEGATIVE    (NEGATIVE)  


 


Acetaminophen     (10-30)  ug/ml


 


Urine Barbiturates  NEGATIVE    (NEGATIVE)  


 


Ur Phencyclidine (PCP)  NEGATIVE    (NEGATIVE)  


 


Urine Amphetamine  NEGATIVE    (NEGATIVE)  


 


U Benzodiazepine Level  NEGATIVE    (NEGATIVE)  


 


Urine Cocaine  NEGATIVE    (NEGATIVE)  


 


Urine Marijuana (THC)  POSITIVE    (NEGATIVE)  


 


Ethyl Alcohol     (0-10)  mg/dL














  08/11/19 08/11/19 08/11/19 Range/Units





  06:30 08:40 11:40 


 


WBC     (4.0-10.5)  K/mm3


 


RBC     (4.1-5.6)  M/mm3


 


Hgb     (12.5-18.0)  gm/dl


 


Hct     (42-50)  %


 


MCV     ()  fl


 


MCH     (26-32)  pg


 


MCHC     (32-36)  g/dl


 


RDW     (11.5-14.0)  %


 


Plt Count     (150-450)  K/mm3


 


MPV     (6-9.5)  fl


 


Gran %     (36.0-66.0)  %


 


Eos # (Auto)     (0-0.5)  


 


Absolute Lymphs (auto)     (1.0-4.6)  


 


Absolute Monos (auto)     (0.0-1.3)  


 


Lymphocytes %     (24.0-44.0)  %


 


Monocytes %     (0.0-12.0)  %


 


Eosinophils %     (0.00-5.0)  %


 


Basophils %     (0.0-0.4)  %


 


Absolute Granulocytes     (1.4-6.9)  


 


Basophils #     (0-0.4)  


 


Sodium     (137-145)  mmol/L


 


Potassium  4.3  D    (3.5-5.1)  mmol/L


 


Chloride     ()  mmol/L


 


Carbon Dioxide     (22-30)  mmol/L


 


Anion Gap     (5-15)  MEQ/L


 


BUN     (9-20)  mg/dL


 


Creatinine     (0.66-1.25)  mg/dL


 


Estimated GFR     ML/MIN


 


Glucose     ()  mg/dL


 


Lactic Acid     (0.4-2.0)  


 


Calcium     (8.4-10.2)  mg/dL


 


Total Bilirubin     (0.2-1.3)  mg/dL


 


AST     (17-59)  U/L


 


ALT     (0-50)  U/L


 


Alkaline Phosphatase     ()  U/L


 


Troponin I   < 0.012  < 0.012  (0.000-0.034)  ng/mL


 


Serum Total Protein     (6.3-8.2)  g/dL


 


Albumin     (3.5-5.0)  g/dL


 


Amylase     ()  U/L


 


Lipase     ()  U/L


 


Urine Color     (YELLOW)  


 


Urine Appearance     (CLEAR)  


 


Urine pH     (5-6)  


 


Ur Specific Gravity     (1.005-1.025)  


 


Urine Protein     (Negative)  


 


Urine Ketones     (NEGATIVE)  


 


Urine Blood     (0-5)  Blake/ul


 


Urine Nitrite     (NEGATIVE)  


 


Urine Bilirubin     (NEGATIVE)  


 


Urine Urobilinogen     (0-1)  mg/dL


 


Ur Leukocyte Esterase     (NEGATIVE)  


 


Urine WBC (Auto)     (0-5)  /HPF


 


Urine RBC (Auto)     (0-2)  /HPF


 


U Hyaline Cast (Auto)     (0-2)  /LPF


 


U Epithel Cells (Auto)     (FEW)  /HPF


 


Urine Bacteria (Auto)     (NEGATIVE)  /HPF


 


Other Casts (Auto)     (NEGATIVE)  /LPF


 


Urine Mucus (Auto)     (NEGATIVE)  /HPF


 


Urine Culture Reflexed     (NO)  


 


Urine Glucose     (NEGATIVE)  mg/dL


 


Emesis for Blood     (Negative)  


 


Salicylates     (2-20)  mg/dL


 


Urine Opiates Level     (NEGATIVE)  


 


Ur Methadone     (NEGATIVE)  


 


Acetaminophen     (10-30)  ug/ml


 


Urine Barbiturates     (NEGATIVE)  


 


Ur Phencyclidine (PCP)     (NEGATIVE)  


 


Urine Amphetamine     (NEGATIVE)  


 


U Benzodiazepine Level     (NEGATIVE)  


 


Urine Cocaine     (NEGATIVE)  


 


Urine Marijuana (THC)     (NEGATIVE)  


 


Ethyl Alcohol     (0-10)  mg/dL














  08/11/19 Range/Units





  12:12 


 


WBC   (4.0-10.5)  K/mm3


 


RBC   (4.1-5.6)  M/mm3


 


Hgb   (12.5-18.0)  gm/dl


 


Hct   (42-50)  %


 


MCV   ()  fl


 


MCH   (26-32)  pg


 


MCHC   (32-36)  g/dl


 


RDW   (11.5-14.0)  %


 


Plt Count   (150-450)  K/mm3


 


MPV   (6-9.5)  fl


 


Gran %   (36.0-66.0)  %


 


Eos # (Auto)   (0-0.5)  


 


Absolute Lymphs (auto)   (1.0-4.6)  


 


Absolute Monos (auto)   (0.0-1.3)  


 


Lymphocytes %   (24.0-44.0)  %


 


Monocytes %   (0.0-12.0)  %


 


Eosinophils %   (0.00-5.0)  %


 


Basophils %   (0.0-0.4)  %


 


Absolute Granulocytes   (1.4-6.9)  


 


Basophils #   (0-0.4)  


 


Sodium   (137-145)  mmol/L


 


Potassium   (3.5-5.1)  mmol/L


 


Chloride   ()  mmol/L


 


Carbon Dioxide   (22-30)  mmol/L


 


Anion Gap   (5-15)  MEQ/L


 


BUN   (9-20)  mg/dL


 


Creatinine   (0.66-1.25)  mg/dL


 


Estimated GFR   ML/MIN


 


Glucose   ()  mg/dL


 


Lactic Acid   (0.4-2.0)  


 


Calcium   (8.4-10.2)  mg/dL


 


Total Bilirubin   (0.2-1.3)  mg/dL


 


AST   (17-59)  U/L


 


ALT   (0-50)  U/L


 


Alkaline Phosphatase   ()  U/L


 


Troponin I   (0.000-0.034)  ng/mL


 


Serum Total Protein   (6.3-8.2)  g/dL


 


Albumin   (3.5-5.0)  g/dL


 


Amylase   ()  U/L


 


Lipase   ()  U/L


 


Urine Color   (YELLOW)  


 


Urine Appearance   (CLEAR)  


 


Urine pH   (5-6)  


 


Ur Specific Gravity   (1.005-1.025)  


 


Urine Protein   (Negative)  


 


Urine Ketones   (NEGATIVE)  


 


Urine Blood   (0-5)  Blake/ul


 


Urine Nitrite   (NEGATIVE)  


 


Urine Bilirubin   (NEGATIVE)  


 


Urine Urobilinogen   (0-1)  mg/dL


 


Ur Leukocyte Esterase   (NEGATIVE)  


 


Urine WBC (Auto)   (0-5)  /HPF


 


Urine RBC (Auto)   (0-2)  /HPF


 


U Hyaline Cast (Auto)   (0-2)  /LPF


 


U Epithel Cells (Auto)   (FEW)  /HPF


 


Urine Bacteria (Auto)   (NEGATIVE)  /HPF


 


Other Casts (Auto)   (NEGATIVE)  /LPF


 


Urine Mucus (Auto)   (NEGATIVE)  /HPF


 


Urine Culture Reflexed   (NO)  


 


Urine Glucose   (NEGATIVE)  mg/dL


 


Emesis for Blood   (Negative)  


 


Salicylates   (2-20)  mg/dL


 


Urine Opiates Level   (NEGATIVE)  


 


Ur Methadone   (NEGATIVE)  


 


Acetaminophen   (10-30)  ug/ml


 


Urine Barbiturates   (NEGATIVE)  


 


Ur Phencyclidine (PCP)   (NEGATIVE)  


 


Urine Amphetamine   (NEGATIVE)  


 


U Benzodiazepine Level   (NEGATIVE)  


 


Urine Cocaine   (NEGATIVE)  


 


Urine Marijuana (THC)   (NEGATIVE)  


 


Ethyl Alcohol  < 10  (0-10)  mg/dL














- Radiology Impressions


Radiology Exams & Impressions: 


 Radiology Procedures











 Category Date Time Status


 


 HEAD WITHOUT CONTRAST [CT] Stat Exams  08/10/19 23:01 Completed














- Other Procedures and Tests


 Respiratory Therapy





08/11/19 05:03


Respiratory Therapy Assessment DAILY 














Assessment/Plan


(1) GI bleed


Current Visit: Yes   Status: Acute   


Qualifiers: 


   GI bleed type/associated pathology: gastritis   Gastritis type: alcoholic   

Qualified Code(s): K29.21 - Alcoholic gastritis with bleeding   


Assessment & Plan: 


Likely just mild alcoholic gastritis, but we did discuss the possibility of 

varices and GI bleeding that could end in death.  He did agree to the EGD 

tomorrow and I have contacted Dr. Dowd.  On IV protonix.  No more vomiting/

bleeding since he was in the ER.


Code(s): K92.2 - GASTROINTESTINAL HEMORRHAGE, UNSPECIFIED   





(2) COPD exacerbation


Current Visit: Yes   Status: Acute   


Assessment & Plan: 


Started IV rocephin and zithromax (day #1).


Code(s): J44.1 - CHRONIC OBSTRUCTIVE PULMONARY DISEASE W (ACUTE) EXACERBATION   





(3) Diarrhea


Current Visit: Yes   Status: Acute   


Qualifiers: 


   Diarrhea type: unspecified type   Qualified Code(s): R19.7 - Diarrhea, 

unspecified   


Assessment & Plan: 


ordered GI panel.


Code(s): R19.7 - DIARRHEA, UNSPECIFIED   





(4) Alcohol intoxication


Current Visit: Yes   Status: Acute   


Qualifiers: 


   Complication of substance-induced condition: uncomplicated   Qualified Code(s

): F10.920 - Alcohol use, unspecified with intoxication, uncomplicated   


Assessment & Plan: 


rechecking alcohol level.  I also consulted [facility], because when the alcohol use 

is enough to send the pt to the hospital, it's a problem that they may want 

help with.








(5) Hypokalemia due to excessive gastrointestinal loss of potassium


Current Visit: Yes   Status: Acute   


Assessment & Plan: 


corrected.


Code(s): E87.6 - HYPOKALEMIA   





(6) History of CVA (cerebrovascular accident)


Current Visit: Yes   Status: Chronic   Code(s): Z86.73 - PRSNL HX OF TIA (TIA), 

AND CEREB INFRC W/O RESID DEFICITS

## 2019-08-12 LAB
ALBUMIN SERPL-MCNC: 3.4 G/DL (ref 3.5–5)
ALP SERPL-CCNC: 87 U/L (ref 38–126)
ALT SERPL-CCNC: 22 U/L (ref 0–50)
ANION GAP SERPL CALC-SCNC: 9.8 MEQ/L (ref 5–15)
AST SERPL QL: 22 U/L (ref 17–59)
BASOPHILS # BLD AUTO: 0.03 10*3/UL (ref 0–0.4)
BASOPHILS NFR BLD AUTO: 0.4 % (ref 0–0.4)
BILIRUB BLD-MCNC: 0.8 MG/DL (ref 0.2–1.3)
BUN SERPL-MCNC: 7 MG/DL (ref 9–20)
C CAYETANENSIS DNA STL QL NAA+NON-PROBE: NEGATIVE
C DIFF TOX A+B STL QL: NEGATIVE
CALCIUM SPEC-MCNC: 9.2 MG/DL (ref 8.4–10.2)
CHLORIDE SERPL-SCNC: 110 MMOL/L (ref 98–107)
CO2 SERPL-SCNC: 24 MMOL/L (ref 22–30)
CREAT SERPL-MCNC: 0.84 MG/DL (ref 0.66–1.25)
E COLI SXT STL QL IA: NEGATIVE
E HISTOLYT DNA SPEC QL NAA+PROBE: NEGATIVE
ENTEROAGGREGATIVE E.COLI: NEGATIVE
ENTEROPATHOGENIC E.COLI: NEGATIVE
ENTEROTOXIGENIC E.COLI: NEGATIVE
EOSINOPHIL # BLD AUTO: 0.13 10*3/UL (ref 0–0.5)
GLUCOSE SERPL-MCNC: 110 MG/DL (ref 74–106)
GRANULOCYTES # BLD AUTO: 3.36 10*3/UL (ref 1.4–6.9)
HCT VFR BLD AUTO: 46.2 % (ref 42–50)
HGB BLD-MCNC: 15.8 GM/DL (ref 12.5–18)
LYMPHOCYTES # SPEC AUTO: 2.91 10*3/UL (ref 1–4.6)
MCH RBC QN AUTO: 30.9 PG (ref 26–32)
MCHC RBC AUTO-ENTMCNC: 34.2 G/DL (ref 32–36)
MONOCYTES # BLD AUTO: 0.57 10*3/UL (ref 0–1.3)
NEUTROPHILS NFR BLD AUTO: 48 % (ref 36–66)
PLATELET # BLD AUTO: 163 K/MM3 (ref 150–450)
POTASSIUM SERPLBLD-SCNC: 4.2 MMOL/L (ref 3.5–5.1)
PROT SERPL-MCNC: 6.3 G/DL (ref 6.3–8.2)
RBC # BLD AUTO: 5.11 M/MM3 (ref 4.1–5.6)
SODIUM SERPL-SCNC: 140 MMOL/L (ref 137–145)
WBC # BLD AUTO: 7 K/MM3 (ref 4–10.5)

## 2019-08-12 RX ADMIN — AZITHROMYCIN DIHYDRATE SCH MG: 250 TABLET, FILM COATED ORAL at 10:18

## 2019-08-12 RX ADMIN — FAMOTIDINE SCH MG: 10 INJECTION INTRAVENOUS at 10:20

## 2019-08-12 RX ADMIN — SERTRALINE SCH MG: 50 TABLET, FILM COATED ORAL at 10:18

## 2019-08-12 RX ADMIN — LISINOPRIL SCH MG: 5 TABLET ORAL at 10:18

## 2019-08-12 RX ADMIN — CEFTRIAXONE SCH MLS/HR: 1 INJECTION, SOLUTION INTRAVENOUS at 10:23

## 2019-08-12 RX ADMIN — PANTOPRAZOLE SODIUM SCH MG: 40 INJECTION, POWDER, FOR SOLUTION INTRAVENOUS at 10:22

## 2019-08-12 RX ADMIN — FAMOTIDINE SCH MG: 10 INJECTION INTRAVENOUS at 21:18

## 2019-08-12 RX ADMIN — SIMVASTATIN SCH MG: 20 TABLET, FILM COATED ORAL at 10:18

## 2019-08-12 RX ADMIN — TAMSULOSIN HYDROCHLORIDE SCH MG: 0.4 CAPSULE ORAL at 10:17

## 2019-08-12 NOTE — PCM.NOTE
Date and Time: 08/12/19  1643





Subjective Assessment: 





Patient reports he doesn't have much appetite right now.  He reports he has not 

been up out of bed yet. His motorized scooter is not here as he was brought by 

ambulance. He reports that he is depressed but denies SI. He is agreeable to 

starting a medication and will think about counseling too. He is agreeable to 

doing a face to face with [facility]. He reports he has been drinking 

alcohol due to being depressed and doesn't want to quite the alcohol. He 

reports stool x 3 since admission and stated stool was dark in color. He had 

upper endoscopy with Dr. Dowd with AM with report of small gastric ulcer, 

gastritis, and hiatal hernia. He feels like he will be able to take care of 

himself at home but is having trouble with rides to doctor's appointments.  





Objective Exam


General Appearance: no apparent distress, other (lying flat in bed; right side 

with limited movement which is his baseline.)


Neurologic Exam: alert, cooperative


Skin Exam: normal color, warm, dry


Respiratory Exam: normal breath sounds, lungs clear, No crackles/rales, No 

rhonchi, No wheezing


Cardiovascular Exam: regular rate/rhythm, normal heart sounds, No murmur, No 

friction rub, No gallop


Gastrointestinal/Abdomen Exam: soft, normal bowel sounds, No tenderness, No 

distention, No mass


Extremity Exam: normal inspection, other (trace edema, no c/c)





OBJECTIVE DATA


Vital Signs: 


 Vital Signs - 24 hr











  Temp Pulse Resp BP BP Pulse Ox


 


 08/12/19 16:00    20   


 


 08/12/19 12:00    20   


 


 08/12/19 10:30   89   135/74  


 


 08/12/19 08:00  98.1 F  81  20   142/85  96


 


 08/12/19 07:55       93 L


 


 08/12/19 05:46  98.5 F  78  20   150/83  93 L


 


 08/12/19 04:10  98.5 F  78  20   150/83  93 L


 


 08/12/19 00:10  98.4 F  95 H  19   139/68  96


 


 08/11/19 20:12   82  22    95


 


 08/11/19 20:00  98.6 F  88  22   130/80  95








 Pain Assessment - Last Documented











Pain Intensity                 0


 


Pain Scale Used                0-10 Pain Scale











Intake and Output: 


 Intake & Output











 08/10/19 08/11/19 08/12/19 08/13/19





 06:59 06:59 06:59 06:59


 


Intake Total   5140 840


 


Output Total   5097 6930


 


Balance   3533 -1410


 


Weight  127.6 kg 122.5 kg 











Lab Results: 


 Lab Results-Last 24 Hours











  08/12/19 08/12/19 Range/Units





  05:25 05:25 


 


WBC  7.0   (4.0-10.5)  K/mm3


 


RBC  5.11   (4.1-5.6)  M/mm3


 


Hgb  15.8  D   (12.5-18.0)  gm/dl


 


Hct  46.2   (42-50)  %


 


MCV  90.4   ()  fl


 


MCH  30.9   (26-32)  pg


 


MCHC  34.2   (32-36)  g/dl


 


RDW  15.3 H   (11.5-14.0)  %


 


Plt Count  163   (150-450)  K/mm3


 


MPV  10.8 H   (6-9.5)  fl


 


Gran %  48.0   (36.0-66.0)  %


 


Eos # (Auto)  0.13   (0-0.5)  


 


Absolute Lymphs (auto)  2.91   (1.0-4.6)  


 


Absolute Monos (auto)  0.57   (0.0-1.3)  


 


Lymphocytes %  41.6   (24.0-44.0)  %


 


Monocytes %  8.1   (0.0-12.0)  %


 


Eosinophils %  1.9   (0.00-5.0)  %


 


Basophils %  0.4   (0.0-0.4)  %


 


Absolute Granulocytes  3.36   (1.4-6.9)  


 


Basophils #  0.03   (0-0.4)  


 


Sodium   140  (137-145)  mmol/L


 


Potassium   4.2  (3.5-5.1)  mmol/L


 


Chloride   110 H D  ()  mmol/L


 


Carbon Dioxide   24  (22-30)  mmol/L


 


Anion Gap   9.8  (5-15)  MEQ/L


 


BUN   7 L  (9-20)  mg/dL


 


Creatinine   0.84  (0.66-1.25)  mg/dL


 


Estimated GFR   > 60.0  ML/MIN


 


Glucose   110 H  ()  mg/dL


 


Calcium   9.2  D  (8.4-10.2)  mg/dL


 


Total Bilirubin   0.80  (0.2-1.3)  mg/dL


 


AST   22  (17-59)  U/L


 


ALT   22  (0-50)  U/L


 


Alkaline Phosphatase   87  ()  U/L


 


Serum Total Protein   6.3  (6.3-8.2)  g/dL


 


Albumin   3.4 L  (3.5-5.0)  g/dL











Radiology Exams: 


 Radiology Procedures











 Category Date Time Status


 


 HEAD WITHOUT CONTRAST [CT] Stat Exams  08/10/19 23:01 Completed











Multi-Disciplinary Progress Notes: 


 Multi-Disciplinary Progress Notes





08/12/19 13:24 Case Management Note by Tasha Jeronimo


ATTEMPTED TO REACH KAHLIL, PT'S SON, NO ANSWER AT EITHER OF THE NUMBERS THAT 

ARE LISTED FOR HIM.





Initialized on 08/12/19 13:24 - END OF NOTE








08/12/19 13:16 Case Management Note by Tasha Jeronimo


CALL TO HELP AT HOME, SPOKE WITH RENEA.





Initialized on 08/12/19 13:16 - END OF NOTE








08/12/19 13:02 Case Management Note by Tasha Jeronimo


DISCHARGE PLAN REVIEWED WITH PT.  REPORTS THAT HE LIVES AT [facility] AND HAS 

A MOTORIZED WHEELCHAIR.  REPORTS THAT HE CAN RUN TO THE GROCERY STORE WITHOUT 

DIFFICULTY.  ALSO, HAS A WALKER THAT HE USES.  PT REPORTS THAT HE HAS HELP AT 

HOME SERVICES, A HOMEMAKER THAT COMES IN WEEKLY TO CLEAN HIS APARTMENT AND ONCE 

A MONTH TO TAKE HIM TO THE GROCERY STORE, HE ALSO, HAS AN AIDE THAT COMES IN ON 

TUES AND THURS TO ASSIST WITH SHOWERS.  REPORTS THAT HIS CAROL CAMMIE ANDERSON 

HELPS HIM FROM TIME TO TIME WITH RIDES, ETC, BUT ALSO, REPORTS THAT HE IS 

HAVING SOME HEALTH ISSUES OF HIS OWN.  REPORTS THAT HE HAS MEDICAID 

TRANSPORTATION BUT HE HAS BEEN HAVING DIFFICULTY WITH RIDES, REPORTS THAT HE 

MAKES APPOINTMENTS, AND THEN THEY CALL HIM AND TELL HIM THAT THEY CAN NOT TAKE 

HIM.  DISCUSSED THAT HE NEEDED TO CALL [facility] TO SCHEDULE ALL MEDICAID 

TRANSPORTATION, AND PT REPORTS THAT HE HAS CALLED THEM AND HAS THEIR NUMBER, 

AND THIS IS WHO HE IS HAVING TROUBLE WITH.  ENCOURAGED PT TO CALL MEDICAID AND 

REPORT THE DIFFICULTIES.  ALSO, DISCUSSED RIDE SOLUTIONS, IF HE COULD AFFORD TO 

PAY FOR RIDES IN TOWN TO DR. TRAYLOR.  PT REPORTS THAT HE WOULD LIKE TO HAVE THAT 

NUMBER FOR A BACKUP.  DISCUSSED THAT THESE RIDES TOO NEED TO BE MADE IN 

ADVANCE.  PT VERBALIZED UNDERSTANDING.  PT REPORTS THAT HIS SON, KAHLIL, LIVES 

IN [facility], AND HE IS UNSURE IF KAHLIL IS AWARE THAT HE IS HERE, 

REPORTS THAT HIS SON IS ALSO HIS POA.  REQUESTED THAT THIS  TRY TO 

REACH HIS SON AND LET HIM KNOW HE IS HERE.  DENIES ADDNL NEEDS AT PRESENT TIME.

  WILL CALL HELP AT HOME TO NOTIFY THAT PT IS HERE, AND WILL ALSO, TRY TO REACH 

PT'S SON.





Initialized on 08/12/19 13:02 - END OF NOTE

















Assessment/Plan


(1) Upper GI bleed


Current Visit: Yes   Status: Acute   


Assessment & Plan: 


Continue protonix and pepcid. Will recheck labs in AM; s/p upper endoscopy with 

Dr. Dowd.


Code(s): K92.2 - GASTROINTESTINAL HEMORRHAGE, UNSPECIFIED   





(2) Gastric ulcer


Current Visit: Yes   Status: Acute   


Qualifiers: 


   Gastric ulcer chronicity: acute 


Code(s): K25.9 - GASTRIC ULCER, UNSP AS ACUTE OR CHRONIC, W/O HEMOR OR PERF   





(3) Diarrhea


Current Visit: Yes   Status: Acute   


Qualifiers: 


   Diarrhea type: unspecified type   Qualified Code(s): R19.7 - Diarrhea, 

unspecified   


Assessment & Plan: 


GI Panel ordered. May be due to antibiotics for copd.


Code(s): R19.7 - DIARRHEA, UNSPECIFIED   





(4) History of CVA (cerebrovascular accident)


Current Visit: Yes   Status: Chronic   


Assessment & Plan: 


Continue home medication. He is at his baseline. Has support in place as 

described in discharge planner's note. Plan to restart aspirin tomorrow. He is 

on aspirin according to our outpatient record. 


Code(s): Z86.73 - PRSNL HX OF TIA (TIA), AND CEREB INFRC W/O RESID DEFICITS   





(5) Alcohol abuse


Current Visit: Yes   Status: Chronic   


Assessment & Plan: 


Recommended AA meetings but he is not interested.


Code(s): F10.10 - ALCOHOL ABUSE, UNCOMPLICATED   





(6) Depression


Current Visit: Yes   Status: Acute   


Qualifiers: 


   Depression Type: major depressive disorder   Active/Remission status: 

currently active   Psychotic features: without psychotic features 


Assessment & Plan: 


I had discussed fluoxetine with patient but he is already on zoloft so will 

increase this from 50 mg to 100 mg and follow up as outpatient. 


Code(s): F32.9 - MAJOR DEPRESSIVE DISORDER, SINGLE EPISODE, UNSPECIFIED   





(7) DVT prophylaxis


Current Visit: Yes   Status: Acute   


Assessment & Plan: 


SCD and amber hose.


Code(s): Z29.9 - ENCOUNTER FOR PROPHYLACTIC MEASURES, UNSPECIFIED   





(8) COPD exacerbation


Current Visit: Yes   Status: Acute   


Assessment & Plan: 


started on ceftriaxone and azithromycin. will continue but if continues to do 

well tomorrow may stop one of the antibiotics as it may be causing diarrhea too.


Code(s): J44.1 - CHRONIC OBSTRUCTIVE PULMONARY DISEASE W (ACUTE) EXACERBATION

## 2019-08-12 NOTE — OP
SURGERY DATE/TIME:  08/12/2019 0815



PREOPERATIVE DIAGNOSIS:    GI bleed. 



POSTOPERATIVE DIAGNOSES:

1) Small antral ulcer. 

2) Hiatal hernia. 

3) Gastritis.       



PROCEDURE:    Esophagogastroduodenoscopy with cold forceps biopsy.



SURGEON:    Dr. Dowd.



ANESTHESIA:    Medications were given by the anesthesia department. 



BRIEF HISTORY: The patient is a 58 year old white male patient known alcoholic, has been 
vomiting this day. His heme-test of his emesis was positive. The patient was felt to need 
to have endoscopic evaluation. He ws appraised of the risks of the procedure including the 
risk of perforation, phlebitis, untoward reaction to medication, bleeding and missed 
lesions. The patient verbalized his understanding and desired to have the procedure 
performed.



DESCRIPTION OF PROCEDURE:    The patient was given the medications by the anesthesia 
department. He had continuous pulse oximetry, ECG monitoring, intermittent blood pressure 
monitoring and tidal CO2 monitoring during the examination. He was placed in the left 
lateral decubitus position. A bite block was placed and the flexible Olympus gastroscope 
was used to intubate the oropharynx. A view of the larynx was obtained and was normal.  
The scope was easily passed in the esophagus which was normal throughout its length. No 
obvious esophageal varices were noted. The stomach was entered where normal gastric rugal 
folds were seen and these distended nicely with insufflation of air. The scope was passed 
along the greater curvature of the stomach to the pylorus. There appeared to be a small 
aphthous ulcer present noted to be benign in appearance with no active bleeding. The 
pylorus appeared to be widely patent. We inspected the duodenum and found no obvious 
bleeding or ulcers here. The scope is withdrawn towards the stomach. A retroflex view was 
obtained of the lesser curvature, fundus and cardia regions of the stomach and there was 
noted a hiatal hernia. The scope was then redirected towards the gastric antrum and 
biopsies were obtained to rule out the presence of Helicobacter pylori-type organisms. The 
scope was removed from the patient who tolerated the procedure well and was sent back to 
the hospital turner in good condition.

## 2019-08-13 VITALS — SYSTOLIC BLOOD PRESSURE: 128 MMHG | DIASTOLIC BLOOD PRESSURE: 68 MMHG | OXYGEN SATURATION: 96 % | HEART RATE: 81 BPM

## 2019-08-13 LAB
ANION GAP SERPL CALC-SCNC: 9.6 MEQ/L (ref 5–15)
BASOPHILS # BLD AUTO: 0.02 10*3/UL (ref 0–0.4)
BASOPHILS NFR BLD AUTO: 0.3 % (ref 0–0.4)
BUN SERPL-MCNC: 9 MG/DL (ref 9–20)
CALCIUM SPEC-MCNC: 9.4 MG/DL (ref 8.4–10.2)
CHLORIDE SERPL-SCNC: 109 MMOL/L (ref 98–107)
CO2 SERPL-SCNC: 24 MMOL/L (ref 22–30)
CREAT SERPL-MCNC: 0.83 MG/DL (ref 0.66–1.25)
EOSINOPHIL # BLD AUTO: 0.23 10*3/UL (ref 0–0.5)
GLUCOSE SERPL-MCNC: 105 MG/DL (ref 74–106)
GRANULOCYTES # BLD AUTO: 3.94 10*3/UL (ref 1.4–6.9)
HCT VFR BLD AUTO: 45.7 % (ref 42–50)
HGB BLD-MCNC: 15.6 GM/DL (ref 12.5–18)
LYMPHOCYTES # SPEC AUTO: 2.86 10*3/UL (ref 1–4.6)
MCH RBC QN AUTO: 30.8 PG (ref 26–32)
MCHC RBC AUTO-ENTMCNC: 34.1 G/DL (ref 32–36)
MONOCYTES # BLD AUTO: 0.67 10*3/UL (ref 0–1.3)
NEUTROPHILS NFR BLD AUTO: 51 % (ref 36–66)
PLATELET # BLD AUTO: 167 K/MM3 (ref 150–450)
POTASSIUM SERPLBLD-SCNC: 3.8 MMOL/L (ref 3.5–5.1)
RBC # BLD AUTO: 5.06 M/MM3 (ref 4.1–5.6)
SODIUM SERPL-SCNC: 139 MMOL/L (ref 137–145)
WBC # BLD AUTO: 7.7 K/MM3 (ref 4–10.5)

## 2019-08-13 RX ADMIN — CEFTRIAXONE SCH MLS/HR: 1 INJECTION, SOLUTION INTRAVENOUS at 09:04

## 2019-08-13 RX ADMIN — AZITHROMYCIN DIHYDRATE SCH MG: 250 TABLET, FILM COATED ORAL at 09:03

## 2019-08-13 RX ADMIN — TAMSULOSIN HYDROCHLORIDE SCH MG: 0.4 CAPSULE ORAL at 09:04

## 2019-08-13 RX ADMIN — SIMVASTATIN SCH MG: 20 TABLET, FILM COATED ORAL at 09:03

## 2019-08-13 RX ADMIN — LISINOPRIL SCH MG: 5 TABLET ORAL at 09:04

## 2019-08-13 RX ADMIN — PANTOPRAZOLE SODIUM SCH MG: 40 INJECTION, POWDER, FOR SOLUTION INTRAVENOUS at 09:04

## 2019-08-13 RX ADMIN — FAMOTIDINE SCH MG: 10 INJECTION INTRAVENOUS at 09:04

## 2019-08-13 NOTE — PCM.DCORD
- Discharge


Disposition: Home, Self-Care


Condition: Good


Prescriptions: 


New


   Aspirin  mg*** [Ecotrin 325 MG***] 325 mg PO DAILY #30 tablet.ec


   PANTOPRAZOLE 40 mg Tablet*** [Protonix 40MG Tablet***] 40 mg PO QAM #30 tab


   Ranitidine HCl 300 mg PO DAILY #2 tablet


   Sertraline HCl 50 mg** [Zoloft 50 mg Tablet**] 150 mg PO DAILY #90 tab





Continue


   Tamsulosin HCl 0.4 mg*** [Flomax 0.4 MG***] 0.4 mg PO DAILY


   Lisinopril 5 mg*** [Zestril 5 MG***] 5 mg PO DAILY


   Atorvastatin Calcium [Lipitor] 80 mg PO DAILY


   Baclofen 10 mg*** [Lioresal 10 mg***] 10 mg PO TID





Discontinued


   Sertraline HCl 50 mg** [Zoloft 50 mg Tablet**] 100 mg PO DAILY


Additional Instructions: 


Follow up with [facility] for counseling. Stop using alcohol.  Return to 

ER if lightheadedness, dizziness, vomiting bright red blood or coffee ground 

emesis or dark black stools or blood in your stool or any other concerns.


Follow up with: 


GUERA FIELDS [Primary Care Provider] - 08/20/19 9:00 am

## 2021-03-04 ENCOUNTER — HOSPITAL ENCOUNTER (EMERGENCY)
Dept: HOSPITAL 33 - ED | Age: 61
Discharge: HOME | End: 2021-03-04
Payer: MEDICARE

## 2021-03-04 VITALS — SYSTOLIC BLOOD PRESSURE: 123 MMHG | DIASTOLIC BLOOD PRESSURE: 92 MMHG | HEART RATE: 92 BPM | OXYGEN SATURATION: 96 %

## 2021-03-04 DIAGNOSIS — Z79.899: ICD-10-CM

## 2021-03-04 DIAGNOSIS — F17.210: ICD-10-CM

## 2021-03-04 DIAGNOSIS — I10: ICD-10-CM

## 2021-03-04 DIAGNOSIS — R11.2: Primary | ICD-10-CM

## 2021-03-04 DIAGNOSIS — R19.7: ICD-10-CM

## 2021-03-04 DIAGNOSIS — Z86.73: ICD-10-CM

## 2021-03-04 DIAGNOSIS — F12.10: ICD-10-CM

## 2021-03-04 DIAGNOSIS — F10.10: ICD-10-CM

## 2021-03-04 LAB
ALBUMIN SERPL-MCNC: 4.5 G/DL (ref 3.5–5)
ALP SERPL-CCNC: 118 U/L (ref 38–126)
ALT SERPL-CCNC: 34 U/L (ref 0–50)
AMPHETAMINES UR QL: NEGATIVE
AMYLASE SERPL-CCNC: 95 U/L (ref 30–110)
ANION GAP SERPL CALC-SCNC: 13.2 MEQ/L (ref 5–15)
AST SERPL QL: 65 U/L (ref 17–59)
BARBITURATES UR QL: NEGATIVE
BASOPHILS # BLD AUTO: 0.03 10*3/UL (ref 0–0.4)
BASOPHILS NFR BLD AUTO: 0.5 % (ref 0–0.4)
BENZODIAZ UR QL SCN: NEGATIVE
BILIRUB BLD-MCNC: 0.6 MG/DL (ref 0.2–1.3)
BUN SERPL-MCNC: 7 MG/DL (ref 9–20)
CALCIUM SPEC-MCNC: 10.2 MG/DL (ref 8.4–10.2)
CHLORIDE SERPL-SCNC: 99 MMOL/L (ref 98–107)
CO2 SERPL-SCNC: 27 MMOL/L (ref 22–30)
COCAINE UR QL SCN: NEGATIVE
CREAT SERPL-MCNC: 0.88 MG/DL (ref 0.66–1.25)
EOSINOPHIL # BLD AUTO: 0.06 10*3/UL (ref 0–0.5)
ETHANOL SERPL-MCNC: < 10 MG/DL (ref 0–10)
FLUBV AG SPEC QL IA: NEGATIVE
GFR SERPLBLD BASED ON 1.73 SQ M-ARVRAT: > 60 ML/MIN
GLUCOSE SERPL-MCNC: 156 MG/DL (ref 74–106)
GLUCOSE UR-MCNC: NEGATIVE MG/DL
HCT VFR BLD AUTO: 53.8 % (ref 42–50)
HGB BLD-MCNC: 18.4 GM/DL (ref 12.5–18)
INFLUENZA A: NEGATIVE
LIPASE SERPL-CCNC: 163 U/L (ref 23–300)
LYMPHOCYTES # SPEC AUTO: 1.71 10*3/UL (ref 1–4.6)
MAGNESIUM SERPL-MCNC: 2.4 MG/DL (ref 1.6–2.3)
MCH RBC QN AUTO: 29.3 PG (ref 26–32)
MCHC RBC AUTO-ENTMCNC: 34.2 G/DL (ref 32–36)
METHADONE UR QL: NEGATIVE
MONOCYTES # BLD AUTO: 0.39 10*3/UL (ref 0–1.3)
OPIATES UR QL: NEGATIVE
PCP UR QL CFM>20 NG/ML: NEGATIVE
PLATELET # BLD AUTO: 181 K/MM3 (ref 150–450)
POTASSIUM SERPLBLD-SCNC: 4.8 MMOL/L (ref 3.5–5.1)
PROT SERPL-MCNC: 8.1 G/DL (ref 6.3–8.2)
PROT UR STRIP-MCNC: NEGATIVE MG/DL
RBC # BLD AUTO: 6.27 M/MM3 (ref 4.1–5.6)
RBC #/AREA URNS HPF: (no result) /HPF (ref 0–2)
SODIUM SERPL-SCNC: 134 MMOL/L (ref 137–145)
THC UR QL SCN: NEGATIVE
WBC # BLD AUTO: 6.3 K/MM3 (ref 4–10.5)
WBC #/AREA URNS HPF: (no result) /HPF (ref 0–5)

## 2021-03-04 PROCEDURE — 86308 HETEROPHILE ANTIBODY SCREEN: CPT

## 2021-03-04 PROCEDURE — 82140 ASSAY OF AMMONIA: CPT

## 2021-03-04 PROCEDURE — 80307 DRUG TEST PRSMV CHEM ANLYZR: CPT

## 2021-03-04 PROCEDURE — 99285 EMERGENCY DEPT VISIT HI MDM: CPT

## 2021-03-04 PROCEDURE — 82150 ASSAY OF AMYLASE: CPT

## 2021-03-04 PROCEDURE — 81001 URINALYSIS AUTO W/SCOPE: CPT

## 2021-03-04 PROCEDURE — P9612 CATHETERIZE FOR URINE SPEC: HCPCS

## 2021-03-04 PROCEDURE — 96361 HYDRATE IV INFUSION ADD-ON: CPT

## 2021-03-04 PROCEDURE — 71045 X-RAY EXAM CHEST 1 VIEW: CPT

## 2021-03-04 PROCEDURE — 36000 PLACE NEEDLE IN VEIN: CPT

## 2021-03-04 PROCEDURE — 36415 COLL VENOUS BLD VENIPUNCTURE: CPT

## 2021-03-04 PROCEDURE — 83605 ASSAY OF LACTIC ACID: CPT

## 2021-03-04 PROCEDURE — G0480 DRUG TEST DEF 1-7 CLASSES: HCPCS

## 2021-03-04 PROCEDURE — 83735 ASSAY OF MAGNESIUM: CPT

## 2021-03-04 PROCEDURE — 80053 COMPREHEN METABOLIC PANEL: CPT

## 2021-03-04 PROCEDURE — 83690 ASSAY OF LIPASE: CPT

## 2021-03-04 PROCEDURE — 85025 COMPLETE CBC W/AUTO DIFF WBC: CPT

## 2021-03-04 PROCEDURE — 74176 CT ABD & PELVIS W/O CONTRAST: CPT

## 2021-03-04 PROCEDURE — 80074 ACUTE HEPATITIS PANEL: CPT

## 2021-03-04 PROCEDURE — 87400 INFLUENZA A/B EACH AG IA: CPT

## 2021-03-04 PROCEDURE — 96360 HYDRATION IV INFUSION INIT: CPT

## 2021-03-04 NOTE — XRAY
Indication: Nausea and vomiting 2 days.  Suspect COVID 19.



Comparison: None



Portable chest demonstrates normal heart and lungs.  Bony thorax intact with

old right 8 posterior lateral rib fracture.

## 2021-03-04 NOTE — ERPHSYRPT
- History of Present Illness


Time Seen by Provider: 03/04/21 15:56


Historian: patient, EMS


Exam Limitations: clinical condition


Physician History: 





This is a 60-year-old white male who has a history of hypertension and history 

of stroke in the past with residual confusion and right-sided weakness as well 

as a history of alcohol abuse and also admits to marijuana use and presents with

vomiting and diarrhea for 3 days.  Patient states his last consumption of 

alcohol and marijuana was yesterday.  Patient has a history of COPD and peptic 

ulcer disease.  He has no significant chest pain he also has no significant 

abdominal pain at this time.  Patient was brought into the emergency department 

by the ambulance service.  Patient states that he did take his medications this 

morning.  He has a remote history of hepatitis per his report.


Timing/Duration: day(s) (2-3)


Activities at Onset: none


Abdominal Pain Onset Location: generalized abdomen


Pain Radiation: no radiation


Severity of Pain-Max: mild


Severity of Pain-Current: none


Modifying Factors: Improves With: vomiting


Associated Symptoms: diarrhea, loss of appetite, nausea, vomiting


Previous symptoms: same symptoms as today


Allergies/Adverse Reactions: 








No Known Drug Allergies Allergy (Verified 03/04/21 15:58)


   





Home Medications: 








Atorvastatin Calcium [Lipitor] 80 mg PO DAILY 01/11/18 [History]


Lisinopril 5 mg*** [Zestril 5 MG***] 5 mg PO DAILY 01/11/18 [History]


Tamsulosin HCl 0.4 mg*** [Flomax 0.4 MG***] 0.4 mg PO DAILY 01/11/18 [History]


Baclofen 10 mg*** [Lioresal 10 mg***] 10 mg PO TID 08/10/19 [History]





Hx Tetanus, Diphtheria Vaccination/Date Given: Yes (2018)


Hx Influenza Vaccination/Date Given: No


Hx Pneumococcal Vaccination/Date Given: No





Travel Risk





- International Travel


Have you traveled outside of the country in past 3 weeks: No





- Coronavirus Screening


Are you exhibiting any of the following symptoms?: Yes


Symptoms: Cough: New Onset, Vomiting/Diarrhea


Close contact with a COVID-19 positive Pt in past 14-21 Days: No





- Review of Systems


Constitutional: No Symptoms


Eyes: No Symptoms


Ears, Nose, & Throat: No Symptoms


Respiratory: Cough


Cardiac: No Symptoms


Abdominal/Gastrointestinal: Nausea, Vomiting, Diarrhea


Genitourinary Symptoms: No Symptoms


Musculoskeletal: No Symptoms


Skin: No Symptoms


Neurological: No Symptoms


Psychological: No Symptoms


Endocrine: No Symptoms


Hematologic/Lymphatic: No Symptoms


Immunological/Allergic: No Symptoms


All Other Systems: Reviewed and Negative





- Past Medical History


Pertinent Past Medical History: Yes


Neurological History: Stroke


ENT History: No Pertinent History


Cardiac History: No Pertinent History


Respiratory History: No Pertinent History


Endocrine Medical History: No Pertinent History


Musculoskeletal History: No Pertinent History


GI Medical History: No Pertinent History


 History: No Pertinent History


Psycho-Social History: No Pertinent History


Male Reproductive Disorders: Prostate Problems


Other Medical History: REPORTS RHUEMATIC FEVER AND HEPITITIS WHEN HE WAS YOUNG 

BUT OTHERISE NONSIGNIFICANT.





- Past Surgical History


Past Surgical History: Yes


Neuro Surgical History: No Pertinent History


Cardiac: No Pertinent History


Respiratory: No Pertinent History


Gastrointestinal: No Pertinent History


Genitourinary: No Pertinent History


Musculoskeletal: Orthopedic Surgery


Male Surgical History: No Pertinent History


Other Surgical History: Colonoscopy





- Social History


Smoking Status: Current every day smoker


How long have you smoked: 30 years


Exposure to second hand smoke: Yes


Drug Use: none


Patient Lives Alone: Yes





- Nursing Vital Signs


Nursing Vital Signs: 


                               Initial Vital Signs











Temperature  98.0 F   03/04/21 15:46


 


Pulse Rate  94 H  03/04/21 15:46


 


Blood Pressure  137/104   03/04/21 15:46


 


O2 Sat by Pulse Oximetry  97   03/04/21 15:46








                                   Pain Scale











Pain Intensity                 0

















- Physical Exam


General Appearance: no apparent distress, alert, anxiety


Eye Exam: PERRL/EOMI, eyes nml inspection


Ears, Nose, Throat Exam: normal ENT inspection, moist mucous membranes


Neck Exam: normal inspection, non-tender, supple, full range of motion


Respiratory Exam: normal breath sounds, lungs clear, airway intact, No chest 

tenderness, No respiratory distress


Cardiovascular Exam: regular rate/rhythm, normal heart sounds, normal peripheral

 pulses


Gastrointestinal/Abdomen Exam: soft, normal bowel sounds, No tenderness, No 

guarding


Rectal Exam: not done


Back Exam: normal inspection, normal range of motion, No CVA tenderness, No 

vertebral tenderness


Extremity Exam: normal inspection, normal range of motion, pelvis stable


Neurologic Exam: alert, oriented x 3, cooperative, CNs II-XII nml as tested, 

normal mood/affect


Skin Exam: normal color, warm, dry


Lymphatic Exam: No adenopathy


**SpO2 Interpretation**: normal


O2 Delivery: Room Air





- Course


Nursing assessment & vital signs reviewed: Yes


Ordered Tests: 


                               Active Orders 24 hr











 Category Date Time Status


 


 IV Insertion STAT Care  03/04/21 15:56 Active


 


 cath [Cath for Specimen-Straight] STAT Care  03/04/21 18:42 Active


 


 ABDOMEN AND PELVIS W/0 CONTRAS [CT] Stat Exams  03/04/21 15:57 Completed


 


 CHEST 1 VIEW (PORTABLE) Stat Exams  03/04/21 16:04 Completed


 


 AMYLASE Stat Lab  03/04/21 16:15 Completed


 


 CBC W DIFF Stat Lab  03/04/21 16:15 Completed


 


 CMP Stat Lab  03/04/21 16:15 Completed


 


 ETHYL ALCOHOL Stat Lab  03/04/21 16:15 Completed


 


 INFLUENZA A+B XIMENA Stat Lab  03/04/21 16:20 Completed


 


 LIPASE Stat Lab  03/04/21 16:15 Completed


 


 Lactic Acid Stat Lab  03/04/21 16:19 Completed


 


 Lactic Acid Stat Lab  03/04/21 18:23 Received


 


 MAGNESIUM Stat Lab  03/04/21 16:15 Completed


 


 Mono Screen Stat Lab  03/04/21 16:15 Completed


 


 UA W/RFX UR CULTURE Stat Lab  03/04/21 18:41 Completed


 


 Urine Triage Profile Stat Lab  03/04/21 18:41 Completed








Medication Summary














Discontinued Medications














Generic Name Dose Route Start Last Admin





  Trade Name Freq  PRN Reason Stop Dose Admin


 


Sodium Chloride  1,000 mls @ 999 mls/hr  03/04/21 15:56  03/04/21 17:26





  Sodium Chloride 0.9% 1000 Ml  IV  03/04/21 16:56  Infused





  .Q1H1M STA   Infusion


 


Sodium Chloride  Confirm  03/04/21 16:09 





  Sodium Chloride 0.9% 1000 Ml  Administered  03/04/21 16:10 





  Dose  





  1,000 mls @ ud  





  .ROUTE  





  .STK-MED ONE  


 


Sodium Chloride  1,000 mls @ 999 mls/hr  03/04/21 17:23  03/04/21 18:47





  Sodium Chloride 0.9% 1000 Ml  IV  03/04/21 18:23  Infused





  .Q1H1M STA   Infusion


 


Sodium Chloride  Confirm  03/04/21 17:24 





  Sodium Chloride 0.9% 1000 Ml  Administered  03/04/21 17:25 





  Dose  





  1,000 mls @ ud  





  .ROUTE  





  .STK-MED ONE  


 


Ondansetron HCl  4 mg  03/04/21 15:56 





  Zofran 4 Mg/2 Ml Vial**  IV  03/04/21 15:57 





  STAT ONE  


 


Pantoprazole Sodium  40 mg  03/04/21 15:56 





  Protonix 40 Mg Iv***  IV  03/04/21 15:57 





  STAT ONE  











Lab/Rad Data: 


                           Laboratory Result Diagrams





                                 03/04/21 16:15 





                                 03/04/21 16:15 





                               Laboratory Results











  03/04/21 03/04/21 03/04/21 Range/Units





  18:41 18:41 16:20 


 


WBC     (4.0-10.5)  K/mm3


 


RBC     (4.1-5.6)  M/mm3


 


Hgb     (12.5-18.0)  gm/dl


 


Hct     (42-50)  %


 


MCV     ()  fl


 


MCH     (26-32)  pg


 


MCHC     (32-36)  g/dl


 


RDW     (11.5-14.0)  %


 


Plt Count     (150-450)  K/mm3


 


MPV     (7.5-11.0)  fl


 


Gran %     (36.0-66.0)  %


 


Eos # (Auto)     (0-0.5)  


 


Absolute Lymphs (auto)     (1.0-4.6)  


 


Absolute Monos (auto)     (0.0-1.3)  


 


Lymphocytes %     (24.0-44.0)  %


 


Monocytes %     (0.0-12.0)  %


 


Eosinophils %     (0.00-5.0)  %


 


Basophils %     (0.0-0.4)  %


 


Absolute Granulocytes     (1.4-6.9)  


 


Basophils #     (0-0.4)  


 


Sodium     (137-145)  mmol/L


 


Potassium     (3.5-5.1)  mmol/L


 


Chloride     ()  mmol/L


 


Carbon Dioxide     (22-30)  mmol/L


 


Anion Gap     (5-15)  MEQ/L


 


BUN     (9-20)  mg/dL


 


Creatinine     (0.66-1.25)  mg/dL


 


Estimated GFR     ML/MIN


 


Glucose     ()  mg/dL


 


Lactic Acid     (0.4-2.0)  


 


Calcium     (8.4-10.2)  mg/dL


 


Magnesium     (1.6-2.3)  mg/dL


 


Total Bilirubin     (0.2-1.3)  mg/dL


 


AST     (17-59)  U/L


 


ALT     (0-50)  U/L


 


Alkaline Phosphatase     ()  U/L


 


Ammonia     (9-30)  umol/L


 


Serum Total Protein     (6.3-8.2)  g/dL


 


Albumin     (3.5-5.0)  g/dL


 


Amylase     ()  U/L


 


Lipase     ()  U/L


 


Urine Color   YELLOW   (YELLOW)  


 


Urine Appearance   CLEAR   (CLEAR)  


 


Urine pH   6.0   (5-6)  


 


Ur Specific Gravity   1.009   (1.005-1.025)  


 


Urine Protein   NEGATIVE   (Negative)  


 


Urine Ketones   NEGATIVE   (NEGATIVE)  


 


Urine Blood   SMALL   (0-5)  Blake/ul


 


Urine Nitrite   NEGATIVE   (NEGATIVE)  


 


Urine Bilirubin   NEGATIVE   (NEGATIVE)  


 


Urine Urobilinogen   NEGATIVE   (0-1)  mg/dL


 


Ur Leukocyte Esterase   NEGATIVE   (NEGATIVE)  


 


Urine WBC (Auto)   NONE   (0-5)  /HPF


 


Urine RBC (Auto)   0-2   (0-2)  /HPF


 


U Epithel Cells (Auto)   NONE   (FEW)  /HPF


 


Urine Bacteria (Auto)   NONE   (NEGATIVE)  /HPF


 


Urine Mucus (Auto)   SLIGHT   (NEGATIVE)  /HPF


 


Urine Culture Reflexed   NO   (NO)  


 


Urine Glucose   NEGATIVE   (NEGATIVE)  mg/dL


 


Urine Opiates Level  NEGATIVE    (NEGATIVE)  


 


Ur Methadone  NEGATIVE    (NEGATIVE)  


 


Urine Barbiturates  NEGATIVE    (NEGATIVE)  


 


Ur Phencyclidine (PCP)  NEGATIVE    (NEGATIVE)  


 


Urine Amphetamine  NEGATIVE    (NEGATIVE)  


 


U Benzodiazepine Level  NEGATIVE    (NEGATIVE)  


 


Urine Cocaine  NEGATIVE    (NEGATIVE)  


 


Urine Marijuana (THC)  NEGATIVE    (NEGATIVE)  


 


Ethyl Alcohol     (0-10)  mg/dL


 


Monoscreen     (Negative)  


 


Influenza Type A Ag    NEGATIVE  (NEGATIVE)  


 


Influenza Type B Ag    NEGATIVE  (NEGATIVE)  














  03/04/21 03/04/21 03/04/21 Range/Units





  16:19 16:15 16:15 


 


WBC     (4.0-10.5)  K/mm3


 


RBC     (4.1-5.6)  M/mm3


 


Hgb     (12.5-18.0)  gm/dl


 


Hct     (42-50)  %


 


MCV     ()  fl


 


MCH     (26-32)  pg


 


MCHC     (32-36)  g/dl


 


RDW     (11.5-14.0)  %


 


Plt Count     (150-450)  K/mm3


 


MPV     (7.5-11.0)  fl


 


Gran %     (36.0-66.0)  %


 


Eos # (Auto)     (0-0.5)  


 


Absolute Lymphs (auto)     (1.0-4.6)  


 


Absolute Monos (auto)     (0.0-1.3)  


 


Lymphocytes %     (24.0-44.0)  %


 


Monocytes %     (0.0-12.0)  %


 


Eosinophils %     (0.00-5.0)  %


 


Basophils %     (0.0-0.4)  %


 


Absolute Granulocytes     (1.4-6.9)  


 


Basophils #     (0-0.4)  


 


Sodium     (137-145)  mmol/L


 


Potassium     (3.5-5.1)  mmol/L


 


Chloride     ()  mmol/L


 


Carbon Dioxide     (22-30)  mmol/L


 


Anion Gap     (5-15)  MEQ/L


 


BUN     (9-20)  mg/dL


 


Creatinine     (0.66-1.25)  mg/dL


 


Estimated GFR     ML/MIN


 


Glucose     ()  mg/dL


 


Lactic Acid  2.0    (0.4-2.0)  


 


Calcium     (8.4-10.2)  mg/dL


 


Magnesium     (1.6-2.3)  mg/dL


 


Total Bilirubin     (0.2-1.3)  mg/dL


 


AST     (17-59)  U/L


 


ALT     (0-50)  U/L


 


Alkaline Phosphatase     ()  U/L


 


Ammonia    < 9 L  (9-30)  umol/L


 


Serum Total Protein     (6.3-8.2)  g/dL


 


Albumin     (3.5-5.0)  g/dL


 


Amylase     ()  U/L


 


Lipase     ()  U/L


 


Urine Color     (YELLOW)  


 


Urine Appearance     (CLEAR)  


 


Urine pH     (5-6)  


 


Ur Specific Gravity     (1.005-1.025)  


 


Urine Protein     (Negative)  


 


Urine Ketones     (NEGATIVE)  


 


Urine Blood     (0-5)  Blake/ul


 


Urine Nitrite     (NEGATIVE)  


 


Urine Bilirubin     (NEGATIVE)  


 


Urine Urobilinogen     (0-1)  mg/dL


 


Ur Leukocyte Esterase     (NEGATIVE)  


 


Urine WBC (Auto)     (0-5)  /HPF


 


Urine RBC (Auto)     (0-2)  /HPF


 


U Epithel Cells (Auto)     (FEW)  /HPF


 


Urine Bacteria (Auto)     (NEGATIVE)  /HPF


 


Urine Mucus (Auto)     (NEGATIVE)  /HPF


 


Urine Culture Reflexed     (NO)  


 


Urine Glucose     (NEGATIVE)  mg/dL


 


Urine Opiates Level     (NEGATIVE)  


 


Ur Methadone     (NEGATIVE)  


 


Urine Barbiturates     (NEGATIVE)  


 


Ur Phencyclidine (PCP)     (NEGATIVE)  


 


Urine Amphetamine     (NEGATIVE)  


 


U Benzodiazepine Level     (NEGATIVE)  


 


Urine Cocaine     (NEGATIVE)  


 


Urine Marijuana (THC)     (NEGATIVE)  


 


Ethyl Alcohol     (0-10)  mg/dL


 


Monoscreen   NEGATIVE   (Negative)  


 


Influenza Type A Ag     (NEGATIVE)  


 


Influenza Type B Ag     (NEGATIVE)  














  03/04/21 03/04/21 Range/Units





  16:15 16:15 


 


WBC   6.3  (4.0-10.5)  K/mm3


 


RBC   6.27 H*  (4.1-5.6)  M/mm3


 


Hgb   18.4 H  (12.5-18.0)  gm/dl


 


Hct   53.8 H  (42-50)  %


 


MCV   85.8  ()  fl


 


MCH   29.3  (26-32)  pg


 


MCHC   34.2  (32-36)  g/dl


 


RDW   15.3 H  (11.5-14.0)  %


 


Plt Count   181  (150-450)  K/mm3


 


MPV   10.2  (7.5-11.0)  fl


 


Gran %   65.3  (36.0-66.0)  %


 


Eos # (Auto)   0.06  (0-0.5)  


 


Absolute Lymphs (auto)   1.71  (1.0-4.6)  


 


Absolute Monos (auto)   0.39  (0.0-1.3)  


 


Lymphocytes %   27.1  (24.0-44.0)  %


 


Monocytes %   6.2  (0.0-12.0)  %


 


Eosinophils %   0.9  (0.00-5.0)  %


 


Basophils %   0.5  (0.0-0.4)  %


 


Absolute Granulocytes   4.13  (1.4-6.9)  


 


Basophils #   0.03  (0-0.4)  


 


Sodium  134 L   (137-145)  mmol/L


 


Potassium  4.8   (3.5-5.1)  mmol/L


 


Chloride  99   ()  mmol/L


 


Carbon Dioxide  27   (22-30)  mmol/L


 


Anion Gap  13.2   (5-15)  MEQ/L


 


BUN  7 L   (9-20)  mg/dL


 


Creatinine  0.88   (0.66-1.25)  mg/dL


 


Estimated GFR  > 60.0   ML/MIN


 


Glucose  156 H   ()  mg/dL


 


Lactic Acid    (0.4-2.0)  


 


Calcium  10.2   (8.4-10.2)  mg/dL


 


Magnesium  2.4 H   (1.6-2.3)  mg/dL


 


Total Bilirubin  0.60   (0.2-1.3)  mg/dL


 


AST  65 H   (17-59)  U/L


 


ALT  34   (0-50)  U/L


 


Alkaline Phosphatase  118   ()  U/L


 


Ammonia    (9-30)  umol/L


 


Serum Total Protein  8.1   (6.3-8.2)  g/dL


 


Albumin  4.5   (3.5-5.0)  g/dL


 


Amylase  95   ()  U/L


 


Lipase  163   ()  U/L


 


Urine Color    (YELLOW)  


 


Urine Appearance    (CLEAR)  


 


Urine pH    (5-6)  


 


Ur Specific Gravity    (1.005-1.025)  


 


Urine Protein    (Negative)  


 


Urine Ketones    (NEGATIVE)  


 


Urine Blood    (0-5)  Blake/ul


 


Urine Nitrite    (NEGATIVE)  


 


Urine Bilirubin    (NEGATIVE)  


 


Urine Urobilinogen    (0-1)  mg/dL


 


Ur Leukocyte Esterase    (NEGATIVE)  


 


Urine WBC (Auto)    (0-5)  /HPF


 


Urine RBC (Auto)    (0-2)  /HPF


 


U Epithel Cells (Auto)    (FEW)  /HPF


 


Urine Bacteria (Auto)    (NEGATIVE)  /HPF


 


Urine Mucus (Auto)    (NEGATIVE)  /HPF


 


Urine Culture Reflexed    (NO)  


 


Urine Glucose    (NEGATIVE)  mg/dL


 


Urine Opiates Level    (NEGATIVE)  


 


Ur Methadone    (NEGATIVE)  


 


Urine Barbiturates    (NEGATIVE)  


 


Ur Phencyclidine (PCP)    (NEGATIVE)  


 


Urine Amphetamine    (NEGATIVE)  


 


U Benzodiazepine Level    (NEGATIVE)  


 


Urine Cocaine    (NEGATIVE)  


 


Urine Marijuana (THC)    (NEGATIVE)  


 


Ethyl Alcohol  < 10   (0-10)  mg/dL


 


Monoscreen    (Negative)  


 


Influenza Type A Ag    (NEGATIVE)  


 


Influenza Type B Ag    (NEGATIVE)  














- Progress


Progress: improved, pain not gone completely, re-examined


Progress Note: 





03/04/21 16:54


Chest x-ray shows no acute cardiopulmonary process


03/04/21 17:19


CAT scan of the abdomen pelvis without contrast shows a small but new distal 

abdominal aortic aneurysm.  There are no acute CT of the abdomen/pelvis without 

contrast findings.


03/04/21 19:37


Medical decision making: This patient states that he is feeling better now than 

when he came in.  His work-up does not show anything acute or emergent.  He 

tolerated liquids without any problems.  We will send him home with 2 take-home 

Zofran ODT tablets and send a prescription of Zofran to his pharmacy.


Counseled pt/family regarding: lab results, diagnosis, need for follow-up, rad 

results





- Departure


Departure Disposition: Home


Clinical Impression: 


 Nausea vomiting and diarrhea





Condition: Stable


Critical Care Time: No


Referrals: 


GUERA FIELDS [Primary Care Provider] - 


Additional Instructions: 


Avoid alcohol and tobacco use.  Take your medication as prescribed.  Drink 

plenty of fluids.  Avoid fatty greasy spicy foods.  Follow-up with your primary 

care physician for further management

## 2021-03-04 NOTE — XRAY
Indication: Nausea, vomiting, and diarrhea.



Multiple contiguous axial images obtained through the abdomen and pelvis

without contrast.



Comparison: April 8, 2011.



Lung bases demonstrates minimal dependent atelectasis.  No infiltrate or

effusion.  Heart is not enlarged.



Noncontrasted stomach and bowel loops appear nonobstructed.  Normal appendix.

Left lower kidney demonstrates 2 new nonobstructing calculi, largest 6 mm.

Stable fatty liver.



Remaining liver, gallbladder, pancreas, spleen, adrenal glands, kidneys,

ureters, and bladder appear unremarkable for noncontrast exam.  Again

scattered aortoiliac calcifications with new 3.5 x 3.7 cm distal AAA.



Osseous structures again demonstrates L4-S1 degenerative vacuum disc

phenomena.  New multiple old right rib fractures.  New small fatty bilateral

inguinal hernias.



Impression:

1.  Again nonobstructing left renal micro-calculi and fatty liver.

2.  New findings including AAA, small bilateral fatty inguinal hernias, and

old right rib fractures.

3.  Remaining CT abdomen/pelvis without contrast exam is negative.

## 2021-06-04 ENCOUNTER — HOSPITAL ENCOUNTER (OUTPATIENT)
Dept: HOSPITAL 33 - ED | Age: 61
Setting detail: OBSERVATION
LOS: 3 days | Discharge: HOME | End: 2021-06-07
Attending: FAMILY MEDICINE | Admitting: FAMILY MEDICINE
Payer: MEDICARE

## 2021-06-04 DIAGNOSIS — I69.351: ICD-10-CM

## 2021-06-04 DIAGNOSIS — R41.0: ICD-10-CM

## 2021-06-04 DIAGNOSIS — Z79.899: ICD-10-CM

## 2021-06-04 DIAGNOSIS — I48.91: Primary | ICD-10-CM

## 2021-06-04 DIAGNOSIS — E78.5: ICD-10-CM

## 2021-06-04 DIAGNOSIS — M79.631: ICD-10-CM

## 2021-06-04 DIAGNOSIS — M25.521: ICD-10-CM

## 2021-06-04 DIAGNOSIS — F10.10: ICD-10-CM

## 2021-06-04 DIAGNOSIS — I10: ICD-10-CM

## 2021-06-04 DIAGNOSIS — Z20.828: ICD-10-CM

## 2021-06-04 LAB
ALBUMIN SERPL-MCNC: 3.8 G/DL (ref 3.5–5)
ALP SERPL-CCNC: 120 U/L (ref 38–126)
ALT SERPL-CCNC: 35 U/L (ref 0–50)
AMPHETAMINES UR QL: NEGATIVE
ANION GAP SERPL CALC-SCNC: 12.1 MEQ/L (ref 5–15)
AST SERPL QL: 55 U/L (ref 17–59)
BARBITURATES UR QL: NEGATIVE
BASOPHILS # BLD AUTO: 0.06 10*3/UL (ref 0–0.4)
BASOPHILS NFR BLD AUTO: 0.8 % (ref 0–0.4)
BENZODIAZ UR QL SCN: NEGATIVE
BILIRUB BLD-MCNC: 1 MG/DL (ref 0.2–1.3)
BUN SERPL-MCNC: 3 MG/DL (ref 9–20)
CALCIUM SPEC-MCNC: 9.2 MG/DL (ref 8.4–10.2)
CHLORIDE SERPL-SCNC: 102 MMOL/L (ref 98–107)
CO2 SERPL-SCNC: 23 MMOL/L (ref 22–30)
COCAINE UR QL SCN: NEGATIVE
CREAT SERPL-MCNC: 0.76 MG/DL (ref 0.66–1.25)
EOSINOPHIL # BLD AUTO: 0.05 10*3/UL (ref 0–0.5)
ETHANOL SERPL-MCNC: < 10 MG/DL (ref 0–10)
GFR SERPLBLD BASED ON 1.73 SQ M-ARVRAT: > 60 ML/MIN
GLUCOSE SERPL-MCNC: 139 MG/DL (ref 74–106)
GLUCOSE UR-MCNC: NEGATIVE MG/DL
HCT VFR BLD AUTO: 52.2 % (ref 42–50)
HGB BLD-MCNC: 17.7 GM/DL (ref 12.5–18)
LIPASE SERPL-CCNC: 144 U/L (ref 23–300)
LYMPHOCYTES # SPEC AUTO: 2.2 10*3/UL (ref 1–4.6)
MCH RBC QN AUTO: 30.1 PG (ref 26–32)
MCHC RBC AUTO-ENTMCNC: 33.9 G/DL (ref 32–36)
METHADONE UR QL: NEGATIVE
MONOCYTES # BLD AUTO: 0.6 10*3/UL (ref 0–1.3)
OPIATES UR QL: NEGATIVE
PCP UR QL CFM>20 NG/ML: NEGATIVE
PLATELET # BLD AUTO: 148 K/MM3 (ref 150–450)
POTASSIUM SERPLBLD-SCNC: 4.2 MMOL/L (ref 3.5–5.1)
PROT SERPL-MCNC: 6.8 G/DL (ref 6.3–8.2)
PROT UR STRIP-MCNC: 30 MG/DL
RBC # BLD AUTO: 5.89 M/MM3 (ref 4.1–5.6)
RBC #/AREA URNS HPF: (no result) /HPF (ref 0–2)
SODIUM SERPL-SCNC: 133 MMOL/L (ref 137–145)
THC UR QL SCN: POSITIVE
WBC # BLD AUTO: 7.5 K/MM3 (ref 4–10.5)
WBC #/AREA URNS HPF: (no result) /HPF (ref 0–5)

## 2021-06-04 PROCEDURE — 85025 COMPLETE CBC W/AUTO DIFF WBC: CPT

## 2021-06-04 PROCEDURE — 93005 ELECTROCARDIOGRAM TRACING: CPT

## 2021-06-04 PROCEDURE — 80048 BASIC METABOLIC PNL TOTAL CA: CPT

## 2021-06-04 PROCEDURE — 99284 EMERGENCY DEPT VISIT MOD MDM: CPT

## 2021-06-04 PROCEDURE — 84484 ASSAY OF TROPONIN QUANT: CPT

## 2021-06-04 PROCEDURE — 93268 ECG RECORD/REVIEW: CPT

## 2021-06-04 PROCEDURE — 93306 TTE W/DOPPLER COMPLETE: CPT

## 2021-06-04 PROCEDURE — 96375 TX/PRO/DX INJ NEW DRUG ADDON: CPT

## 2021-06-04 PROCEDURE — 83735 ASSAY OF MAGNESIUM: CPT

## 2021-06-04 PROCEDURE — U0003 INFECTIOUS AGENT DETECTION BY NUCLEIC ACID (DNA OR RNA); SEVERE ACUTE RESPIRATORY SYNDROME CORONAVIRUS 2 (SARS-COV-2) (CORONAVIRUS DISEASE [COVID-19]), AMPLIFIED PROBE TECHNIQUE, MAKING USE OF HIGH THROUGHPUT TECHNOLOGIES AS DESCRIBED BY CMS-2020-01-R: HCPCS

## 2021-06-04 PROCEDURE — 96360 HYDRATION IV INFUSION INIT: CPT

## 2021-06-04 PROCEDURE — 99291 CRITICAL CARE FIRST HOUR: CPT

## 2021-06-04 PROCEDURE — 36415 COLL VENOUS BLD VENIPUNCTURE: CPT

## 2021-06-04 PROCEDURE — 80307 DRUG TEST PRSMV CHEM ANLYZR: CPT

## 2021-06-04 PROCEDURE — 81001 URINALYSIS AUTO W/SCOPE: CPT

## 2021-06-04 PROCEDURE — 71045 X-RAY EXAM CHEST 1 VIEW: CPT

## 2021-06-04 PROCEDURE — 83605 ASSAY OF LACTIC ACID: CPT

## 2021-06-04 PROCEDURE — 96374 THER/PROPH/DIAG INJ IV PUSH: CPT

## 2021-06-04 PROCEDURE — 80053 COMPREHEN METABOLIC PANEL: CPT

## 2021-06-04 PROCEDURE — 36000 PLACE NEEDLE IN VEIN: CPT

## 2021-06-04 PROCEDURE — G0480 DRUG TEST DEF 1-7 CLASSES: HCPCS

## 2021-06-04 PROCEDURE — 73110 X-RAY EXAM OF WRIST: CPT

## 2021-06-04 PROCEDURE — 83690 ASSAY OF LIPASE: CPT

## 2021-06-04 PROCEDURE — 73080 X-RAY EXAM OF ELBOW: CPT

## 2021-06-04 PROCEDURE — 70450 CT HEAD/BRAIN W/O DYE: CPT

## 2021-06-04 PROCEDURE — G0378 HOSPITAL OBSERVATION PER HR: HCPCS

## 2021-06-04 PROCEDURE — 84443 ASSAY THYROID STIM HORMONE: CPT

## 2021-06-04 RX ADMIN — POTASSIUM CHLORIDE AND SODIUM CHLORIDE SCH MLS/HR: 900; 150 INJECTION, SOLUTION INTRAVENOUS at 20:45

## 2021-06-04 NOTE — XRAY
Indication: Status post fall.



Comparison: None



3 view right elbow demonstrates osteopenia.  No other bony, articular, or soft

tissue abnormalities.

## 2021-06-04 NOTE — ERPHSYRPT
- History of Present Illness


Time Seen by Provider: 06/04/21 11:55


Source: patient, EMS


Exam Limitations: clinical condition


Patient Subjective Stated Complaint: pt here for right arm pain today, pt is a 

poor historian. home aid told ems is was not himself , pt has hx of stroke and 

has weakness to right side,  pt has hx of falling a  lot


Triage Nursing Assessment: pt alert, knows name,BD,place,but not date, pt states

he has now alcohol today, skin w/d/p, has abrasions and bruising to right arm, 

and abrasion to right knee, pt was in cont. of urine, and anastacia area is 

excoriated, pt cleaned up and depends placed


Physician History: 





60 years old male with a history of CVA with right-sided residual weakness, 

wheelchair-bound, frequent falls, alcohol abuse, hypertension, hyperlipidemia is

brought in the ER after his age reported that patient was not acting himself, 

more confused than usual.  He is complaining of pain right elbow and forearm.  

Patient reports daily alcohol intake 6-7 beers and marijuana use.  Has multiple 

falls but denies any recent head injury.  Patient has abrasion on the right knee

and right forearm/elbow area.  Denies any chest pain palpitations or shortness 

of breath.  No abdominal pain nausea vomiting.  Patient is answering some 

question and not fully oriented.  History is limited.


Allergies/Adverse Reactions: 








No Known Drug Allergies Allergy (Verified 06/04/21 12:17)


   





Home Medications: 








Atorvastatin Calcium [Lipitor] 80 mg PO DAILY 01/11/18 [History]


Lisinopril 5 mg*** [Zestril 5 MG***] 5 mg PO DAILY 01/11/18 [History]


Tamsulosin HCl 0.4 mg*** [Flomax 0.4 MG***] 0.4 mg PO DAILY 01/11/18 [History]


Baclofen 10 mg*** [Lioresal 10 mg***] 10 mg PO TID 08/10/19 [History]





Hx Tetanus, Diphtheria Vaccination/Date Given: Yes (2018)


Hx Influenza Vaccination/Date Given: No


Hx Pneumococcal Vaccination/Date Given: No


Immunizations Up to Date: Yes





Travel Risk





- International Travel


Have you traveled outside of the country in past 3 weeks: No





- Coronavirus Screening


Are you exhibiting any of the following symptoms?: No


Close contact with a COVID-19 positive Pt in past 14-21 Days: No





- Vaccine Status


Have you recieved a Covid-19 vaccination: No





- Review of Systems


All Other Systems: Unable due to condition





- Past Medical History


Pertinent Past Medical History: Yes


Neurological History: Stroke


ENT History: No Pertinent History


Cardiac History: No Pertinent History


Respiratory History: No Pertinent History


Endocrine Medical History: No Pertinent History


Musculoskeletal History: No Pertinent History


GI Medical History: No Pertinent History


 History: No Pertinent History


Psycho-Social History: No Pertinent History


Male Reproductive Disorders: Prostate Problems


Other Medical History: REPORTS RHUEMATIC FEVER AND HEPITITIS WHEN HE WAS YOUNG 

BUT OTHERISE NONSIGNIFICANT.





- Past Surgical History


Past Surgical History: Yes


Neuro Surgical History: No Pertinent History


Cardiac: No Pertinent History


Respiratory: No Pertinent History


Gastrointestinal: No Pertinent History


Genitourinary: No Pertinent History


Musculoskeletal: Orthopedic Surgery


Male Surgical History: No Pertinent History


Other Surgical History: Colonoscopy





- Social History


Smoking Status: Current every day smoker


How long have you smoked: 30 years


Exposure to second hand smoke: Yes


Drug Use: none


Patient Lives Alone: Yes





- Nursing Vital Signs


Nursing Vital Signs: 


                               Initial Vital Signs











Temperature  98.0 F   06/04/21 11:51


 


Pulse Rate  130 H  06/04/21 11:51


 


Respiratory Rate  22   06/04/21 11:51


 


Blood Pressure  159/91   06/04/21 11:51


 


O2 Sat by Pulse Oximetry  96   06/04/21 11:51








                                   Pain Scale











Pain Intensity                 0

















- Physical Exam


General Appearance: no apparent distress, alert


Eye Exam: PERRL/EOMI, eyes nml inspection


Ears, Nose, Throat Exam: normal ENT inspection, TMs normal, pharynx normal, 

moist mucous membranes


Neck Exam: normal inspection, non-tender, supple, full range of motion


Respiratory Exam: normal breath sounds, lungs clear


Cardiovascular Exam: normal heart sounds, tachycardia


Gastrointestinal/Abdomen Exam: soft, normal bowel sounds, No tenderness


Back Exam: normal inspection, No CVA tenderness


Extremity Exam: other (Right upper extremity with multiple abrasions, mild 

tenderness in the forearm, intact passive range of motion of the elbow and 

wrist.)


Neurologic Exam: alert, cooperative, CNs II-XII nml as tested, motor deficits, 

motor weakness (Right upper and lower extremities), No oriented x 3, No normal 

mood/affect, No nml cerebellar function, No nml station & gait, No sensation nml


Skin Exam: normal color


**SpO2 Interpretation**: normal


SpO2: 96


O2 Delivery: Nasal Cannula





- Course


EKG Interpreted by Me: RATE (131), Sinus Tach, NORMAL AXIS, prolonged QT 

interval, Non-specific ST Changes


Ordered Tests: 


                               Active Orders 24 hr











 Category Date Time Status


 


 EKG-ER Only STAT Care  06/04/21 12:16 Active


 


 IV Insertion STAT Care  06/04/21 12:16 Active


 


 NPO (ED) STAT Care  06/04/21 12:16 Active


 


 CHEST 1 VIEW (PORTABLE) Stat Exams  06/04/21 12:16 Completed


 


 ELBOW (MINIMUM 3 VIEWS) Stat Exams  06/04/21 13:06 Completed


 


 HEAD WITHOUT CONTRAST [CT] Stat Exams  06/04/21 11:52 Completed


 


 WRIST (MIN 3 VIEWS) Stat Exams  06/04/21 13:06 Completed


 


 CBC W DIFF Stat Lab  06/04/21 12:29 Completed


 


 CMP Stat Lab  06/04/21 12:29 Completed


 


 ETHYL ALCOHOL Stat Lab  06/04/21 12:29 Completed


 


 LIPASE Stat Lab  06/04/21 12:29 Completed


 


 Lactic Acid Stat Lab  06/04/21 12:28 Completed


 


 Lactic Acid Stat Lab  06/04/21 14:31 Completed


 


 TROPONIN Q3H Lab  06/04/21 12:29 Completed


 


 TROPONIN Q3H Lab  06/04/21 15:13 Completed


 


 TROPONIN Q3H Lab  06/04/21 18:30 Ordered


 


 TROPONIN Q3H Lab  06/04/21 21:30 Ordered


 


 TROPONIN Q3H Lab  06/05/21 00:30 Ordered


 


 UA W/RFX UR CULTURE Stat Lab  06/04/21 12:16 Ordered


 


 Urine Triage Profile Stat Lab  06/04/21 12:16 Ordered








Medication Summary











Generic Name Dose Route Start Last Admin





  Trade Name Freq  PRN Reason Stop Dose Admin


 


Sodium Chloride  1,000 mls @ 125 mls/hr  06/04/21 12:30  06/04/21 12:55





  Sodium Chloride 0.9% 1000 Ml  IV  07/04/21 12:29  125 mls/hr





  .Q8H ROSA   Administration














Discontinued Medications














Generic Name Dose Route Start Last Admin





  Trade Name Freq  PRN Reason Stop Dose Admin


 


Lorazepam  1 mg  06/04/21 13:48  06/04/21 15:15





  Ativan 2 Mg/1 Ml Vial***  IV  06/04/21 13:49  1 mg





  STAT ONE   Administration


 


Lorazepam  Confirm  06/04/21 15:14 





  Ativan 2 Mg/1 Ml Vial***  Administered  06/04/21 15:15 





  Dose  





  2 mg  





  .ROUTE  





  .STK-MED ONE  


 


Ondansetron HCl  4 mg  06/04/21 12:17  06/04/21 12:56





  Zofran 4 Mg/2 Ml Vial**  IV  06/04/21 12:18  4 mg





  STAT ONE   Administration


 


Ondansetron HCl  Confirm  06/04/21 12:52 





  Zofran 4 Mg/2 Ml Vial**  Administered  06/04/21 12:53 





  Dose  





  4 mg  





  .ROUTE  





  .[facility] ONE  











Lab/Rad Data: 


                           Laboratory Result Diagrams





                                 06/04/21 12:29 





                                 06/04/21 12:29 





                               Laboratory Results











  06/04/21 06/04/21 06/04/21 Range/Units





  15:13 14:31 12:29 


 


WBC     (4.0-10.5)  K/mm3


 


RBC     (4.1-5.6)  M/mm3


 


Hgb     (12.5-18.0)  gm/dl


 


Hct     (42-50)  %


 


MCV     ()  fl


 


MCH     (26-32)  pg


 


MCHC     (32-36)  g/dl


 


RDW     (11.5-14.0)  %


 


Plt Count     (150-450)  K/mm3


 


MPV     (7.5-11.0)  fl


 


Gran %     (36.0-66.0)  %


 


Eos # (Auto)     (0-0.5)  


 


Absolute Lymphs (auto)     (1.0-4.6)  


 


Absolute Monos (auto)     (0.0-1.3)  


 


Lymphocytes %     (24.0-44.0)  %


 


Monocytes %     (0.0-12.0)  %


 


Eosinophils %     (0.00-5.0)  %


 


Basophils %     (0.0-0.4)  %


 


Absolute Granulocytes     (1.4-6.9)  


 


Basophils #     (0-0.4)  


 


Sodium     (137-145)  mmol/L


 


Potassium     (3.5-5.1)  mmol/L


 


Chloride     ()  mmol/L


 


Carbon Dioxide     (22-30)  mmol/L


 


Anion Gap     (5-15)  MEQ/L


 


BUN     (9-20)  mg/dL


 


Creatinine     (0.66-1.25)  mg/dL


 


Estimated GFR     ML/MIN


 


Glucose     ()  mg/dL


 


Lactic Acid   1.7   (0.4-2.0)  


 


Calcium     (8.4-10.2)  mg/dL


 


Total Bilirubin     (0.2-1.3)  mg/dL


 


AST     (17-59)  U/L


 


ALT     (0-50)  U/L


 


Alkaline Phosphatase     ()  U/L


 


Troponin I  < 0.012   < 0.012  (0.000-0.034)  ng/mL


 


Serum Total Protein     (6.3-8.2)  g/dL


 


Albumin     (3.5-5.0)  g/dL


 


Lipase     ()  U/L


 


Ethyl Alcohol     (0-10)  mg/dL














  06/04/21 06/04/21 06/04/21 Range/Units





  12:29 12:29 12:28 


 


WBC   7.5   (4.0-10.5)  K/mm3


 


RBC   5.89 H   (4.1-5.6)  M/mm3


 


Hgb   17.7   (12.5-18.0)  gm/dl


 


Hct   52.2 H   (42-50)  %


 


MCV   88.6   ()  fl


 


MCH   30.1   (26-32)  pg


 


MCHC   33.9   (32-36)  g/dl


 


RDW   16.3 H   (11.5-14.0)  %


 


Plt Count   148 L   (150-450)  K/mm3


 


MPV   10.2   (7.5-11.0)  fl


 


Gran %   61.2   (36.0-66.0)  %


 


Eos # (Auto)   0.05   (0-0.5)  


 


Absolute Lymphs (auto)   2.20   (1.0-4.6)  


 


Absolute Monos (auto)   0.60   (0.0-1.3)  


 


Lymphocytes %   29.3   (24.0-44.0)  %


 


Monocytes %   8.0   (0.0-12.0)  %


 


Eosinophils %   0.7   (0.00-5.0)  %


 


Basophils %   0.8   (0.0-0.4)  %


 


Absolute Granulocytes   4.61   (1.4-6.9)  


 


Basophils #   0.06   (0-0.4)  


 


Sodium  133 L    (137-145)  mmol/L


 


Potassium  4.2    (3.5-5.1)  mmol/L


 


Chloride  102    ()  mmol/L


 


Carbon Dioxide  23    (22-30)  mmol/L


 


Anion Gap  12.1    (5-15)  MEQ/L


 


BUN  3 L    (9-20)  mg/dL


 


Creatinine  0.76    (0.66-1.25)  mg/dL


 


Estimated GFR  > 60.0    ML/MIN


 


Glucose  139 H    ()  mg/dL


 


Lactic Acid    2.1 H  (0.4-2.0)  


 


Calcium  9.2    (8.4-10.2)  mg/dL


 


Total Bilirubin  1.00    (0.2-1.3)  mg/dL


 


AST  55    (17-59)  U/L


 


ALT  35    (0-50)  U/L


 


Alkaline Phosphatase  120    ()  U/L


 


Troponin I     (0.000-0.034)  ng/mL


 


Serum Total Protein  6.8    (6.3-8.2)  g/dL


 


Albumin  3.8    (3.5-5.0)  g/dL


 


Lipase  144    ()  U/L


 


Ethyl Alcohol  < 10    (0-10)  mg/dL














- Progress


Progress: improved, re-examined


Progress Note: 





06/04/21 17:20


60 years old is evaluated for altered mental status.  Patient was alert and pa

rtially oriented on presentation, has chronic weakness on the right side which 

is not any worse than usual.  CT head is obtained which is negative for any 

acute findings.  Given fluid bolus and Ativan which calmed him down and he is 

almost back to his normal.  Has normal white count, grossly unremarkable 

chemistries with mild out elevated lactate of 2.1 without any focus of infection

 in the chest.  Patient refused to urinate.  Blood alcohol is normal.  This 

tachycardia I believe is secondary to his alcohol withdrawal as he is a daily 

drinker.  I have contacted home health who do not think patient can take care of

 himself at home and would not be a good idea to send him back home and would be

 better served to be sent somewhere else.  We have tried to contact Adult 

Protective Services/ but could not.  I think with patient's 

right-sided weakness and habit of drinking with frequent falls, patient would be

 admitted here and will give him fluids, place him on CIWA protocol and case 

management will be involved for discharge planning.  Discussed with Dr. Larson 

and patient is accepted for admission.


Discussed with .: Lorne


Counseled pt/family regarding: lab results, diagnosis, rad results





- Departure


Departure Disposition: Observation


Clinical Impression: 


 General weakness, Frequent falls





Alcohol withdrawal


Qualifiers:


 Complication of substance-induced condition: uncomplicated Qualified Code(s): 

F10.230 - Alcohol dependence with withdrawal, uncomplicated





AMS (altered mental status)


Qualifiers:


 Altered mental status type: unspecified Qualified Code(s): R41.82 - Altered 

mental status, unspecified





Condition: Stable


Critical Care Time: Yes


Critical Care Time(excluding separately billable procedures): Critical 30-74 

mins


Referrals: 


ALVARO HERNANDEZ [Primary Care Provider] -

## 2021-06-04 NOTE — XRAY
Indication: Status post fall.



Comparison: None



3 view right wrist demonstrates osteopenia and radiocarpal degenerative joint

space narrowing.  No other bony, articular, or soft tissue abnormalities.

## 2021-06-04 NOTE — XRAY
Indication: Ultra mental status.  Stroke.



Comparison: August 10, 2019.



There is again large left frontoparietal old infarct.  Increasing

encephalomalacia near the vertex and posteriorly favors new finding for old

infarct.  No acute intracranial hemorrhage, hydrocephalus, or mass effect.

Fourth ventricle is midline.  Bony calvarium intact.  Visualized paranasal

sinuses and mastoid air cells are clear.



Impression: Large old left cerebral infarct.  No acute intracranial

abnormalities.

## 2021-06-05 LAB
ANION GAP SERPL CALC-SCNC: 8.1 MEQ/L (ref 5–15)
BASOPHILS # BLD AUTO: 0.04 10*3/UL (ref 0–0.4)
BASOPHILS NFR BLD AUTO: 0.6 % (ref 0–0.4)
BUN SERPL-MCNC: 8 MG/DL (ref 9–20)
CALCIUM SPEC-MCNC: 8.8 MG/DL (ref 8.4–10.2)
CHLORIDE SERPL-SCNC: 103 MMOL/L (ref 98–107)
CO2 SERPL-SCNC: 24 MMOL/L (ref 22–30)
CREAT SERPL-MCNC: 0.83 MG/DL (ref 0.66–1.25)
EOSINOPHIL # BLD AUTO: 0.06 10*3/UL (ref 0–0.5)
GFR SERPLBLD BASED ON 1.73 SQ M-ARVRAT: > 60 ML/MIN
GLUCOSE SERPL-MCNC: 100 MG/DL (ref 74–106)
HCT VFR BLD AUTO: 46.8 % (ref 42–50)
HGB BLD-MCNC: 15.4 GM/DL (ref 12.5–18)
LYMPHOCYTES # SPEC AUTO: 2.41 10*3/UL (ref 1–4.6)
MCH RBC QN AUTO: 30.2 PG (ref 26–32)
MCHC RBC AUTO-ENTMCNC: 32.9 G/DL (ref 32–36)
MONOCYTES # BLD AUTO: 0.58 10*3/UL (ref 0–1.3)
PLATELET # BLD AUTO: 125 K/MM3 (ref 150–450)
POTASSIUM SERPLBLD-SCNC: 4 MMOL/L (ref 3.5–5.1)
RBC # BLD AUTO: 5.1 M/MM3 (ref 4.1–5.6)
SODIUM SERPL-SCNC: 131 MMOL/L (ref 137–145)
WBC # BLD AUTO: 6.3 K/MM3 (ref 4–10.5)

## 2021-06-05 RX ADMIN — ATORVASTATIN CALCIUM SCH MG: 40 TABLET, FILM COATED ORAL at 22:20

## 2021-06-05 RX ADMIN — BACLOFEN SCH MG: 10 TABLET ORAL at 15:22

## 2021-06-05 RX ADMIN — TAMSULOSIN HYDROCHLORIDE SCH MG: 0.4 CAPSULE ORAL at 15:22

## 2021-06-05 RX ADMIN — FOLIC ACID SCH MG: 1 TABLET ORAL at 09:27

## 2021-06-05 RX ADMIN — Medication SCH MG: at 09:27

## 2021-06-05 RX ADMIN — THERA TABS SCH TAB: TAB at 09:27

## 2021-06-05 RX ADMIN — LISINOPRIL SCH MG: 5 TABLET ORAL at 15:22

## 2021-06-05 RX ADMIN — PANTOPRAZOLE SODIUM SCH MG: 40 TABLET, DELAYED RELEASE ORAL at 15:22

## 2021-06-05 RX ADMIN — APIXABAN SCH MG: 2.5 TABLET, FILM COATED ORAL at 22:20

## 2021-06-05 RX ADMIN — BACLOFEN SCH MG: 10 TABLET ORAL at 22:20

## 2021-06-05 RX ADMIN — POTASSIUM CHLORIDE AND SODIUM CHLORIDE SCH MLS/HR: 900; 150 INJECTION, SOLUTION INTRAVENOUS at 06:37

## 2021-06-05 RX ADMIN — SERTRALINE SCH MG: 50 TABLET, FILM COATED ORAL at 15:22

## 2021-06-05 NOTE — PCM.HP
History of Present Illness





- Chief Complaint


Chief Complaint: AMS


History of Present Illness: 


 is a 60 year old male who presented to the ER with arm pain, he is a

very poor historian, prior history of CVA and has multiple falls. apparently 

home health aid was concerned about his ability to care for himself. he admits 

to drinking "every other day or so" unable to quantify, he is not interested in 

quitting. he is interested in rehab stay due to poor functional status and 

difficulty caring for himself.








- Review of Systems


Constitutional: No Fever, No Chills


Respiratory: No Cough, No Short Of Breath


Cardiac: No Chest Pain, No Edema, No Syncope


Abdominal/Gastrointestinal: No Abdominal Pain, No Nausea, No Vomiting, No 

Diarrhea


Neurological: Focal Weakness (chronic)


Psychological: Alcohol Abuse


All Other Systems: Reviewed and Negative





Medications & Allergies


Home Medications: 


                              Home Medication List





Atorvastatin Calcium [Lipitor] 80 mg PO DAILY 01/11/18 [History Confirmed 

06/04/21]


Lisinopril 5 mg*** [Zestril 5 MG***] 5 mg PO DAILY 01/11/18 [History Confirmed 

06/04/21]


Tamsulosin HCl 0.4 mg*** [Flomax 0.4 MG***] 0.4 mg PO DAILY 01/11/18 [History 

Confirmed 06/04/21]


Baclofen 10 mg*** [Lioresal 10 mg***] 10 mg PO TID 08/10/19 [History Confirmed 

06/04/21]


Aspirin  mg*** [Ecotrin 325 MG***] 325 mg PO DAILY #30 tablet.ec 08/13/19 

[Rx Confirmed 06/04/21]


PANTOPRAZOLE 40 mg Tablet*** [Protonix 40MG Tablet***] 40 mg PO QAM #30 tab 

08/13/19 [Rx Confirmed 06/04/21]


Sertraline HCl 50 mg** [Zoloft 50 mg Tablet**] 150 mg PO DAILY #90 tab 08/13/19 

[Rx Confirmed 06/04/21]


Ondansetron ODT 4 MG*** [Zofran Odt 4 mg***] 4 mg PO Q6H PRN PRN #10 tab.rapdis 

03/04/21 [Rx Confirmed 06/04/21]








Allergies/Adverse Reactions: 


                                    Allergies











Allergy/AdvReac Type Severity Reaction Status Date / Time


 


No Known Drug Allergies Allergy   Verified 06/04/21 12:17














- Past Medical History


Past Medical History: Yes


Neurological History: Stroke


ENT History: No Pertinent History


Cardiac History: No Pertinent History


Respiratory History: No Pertinent History


Endocrine Medical History: No Pertinent History


Musculoskelatal History: No Pertinent History


GI Medical History: No Pertinent History


 History: No Pertinent History


Pyscho-Social History: No Pertinent History


Male Reproductive Disorders: Prostate Problems


Comment: REPORTS RHUEMATIC FEVER AND HEPITITIS WHEN HE WAS YOUNG BUT OTHERISE 

NONSIGNIFICANT.





- Past Surgical History


Past Surgical History: Yes


Neuro Surgical History: No Pertinent History


Cardiac History: No Pertinent History


Respiratory Surgery: No Pertinent History


GI Surgical History: No Pertinent History


Genitourinary Surgical Hx: No Pertinent History


Musculskeletal Surgical Hx: Orthopedic Surgery


Male Surgical History: No Pertinent History


Other Surgical History: Colonoscopy





- Social History


Smoking Status: Current every day smoker


How long have you smoked: mostoflife


Exposure to second hand smoke: No


Alcohol: Heavy, Daily


Drug Use: none





- Physical Exam


Vital Signs: 


                               Vital Signs - 24 hr











  Temp Pulse Resp BP Pulse Ox


 


 06/05/21 08:00  97.1 F  84  16  141/76  91 L


 


 06/05/21 04:18  98.1 F  106 H  28 H  122/82  96


 


 06/05/21 00:03  98.2 F  98 H  20  134/74  95


 


 06/04/21 21:22  97.8 F  103 H  18  127/75  93 L


 


 06/04/21 18:02  97.8 F  104 H  18  139/98  98


 


 06/04/21 17:27  97.8 F  109 H  20  139/98  98


 


 06/04/21 17:23      96


 


 06/04/21 16:06  97.8 F  95 H  20  144/66  98


 


 06/04/21 15:19  98.0 F  119 H  20  128/99  98


 


 06/04/21 14:54   120 H  18  136/91  96


 


 06/04/21 13:01  98.0 F  115 H  20  159/91  98


 


 06/04/21 11:51  98.0 F  130 H  22  159/91  96











General Appearance: no apparent distress


Neurologic Exam: alert, oriented x 3, cooperative, motor weakness


Respiratory Exam: normal breath sounds, lungs clear, No respiratory distress


Cardiovascular Exam: regular rate/rhythm


Gastrointestinal/Abdomen Exam: soft, normal bowel sounds, No tenderness, No mass


Extremity Exam: normal inspection, normal range of motion, pelvis stable


Skin Exam: normal color, warm, dry, No rash





Results





- Labs


Lab/Micro Results: 


                            Lab Results-Last 24 Hours











  06/04/21 06/04/21 06/04/21 Range/Units





  12:28 12:29 12:29 


 


WBC   7.5   (4.0-10.5)  K/mm3


 


RBC   5.89 H   (4.1-5.6)  M/mm3


 


Hgb   17.7   (12.5-18.0)  gm/dl


 


Hct   52.2 H   (42-50)  %


 


MCV   88.6   ()  fl


 


MCH   30.1   (26-32)  pg


 


MCHC   33.9   (32-36)  g/dl


 


RDW   16.3 H   (11.5-14.0)  %


 


Plt Count   148 L   (150-450)  K/mm3


 


MPV   10.2   (7.5-11.0)  fl


 


Gran %   61.2   (36.0-66.0)  %


 


Eos # (Auto)   0.05   (0-0.5)  


 


Absolute Lymphs (auto)   2.20   (1.0-4.6)  


 


Absolute Monos (auto)   0.60   (0.0-1.3)  


 


Lymphocytes %   29.3   (24.0-44.0)  %


 


Monocytes %   8.0   (0.0-12.0)  %


 


Eosinophils %   0.7   (0.00-5.0)  %


 


Basophils %   0.8   (0.0-0.4)  %


 


Absolute Granulocytes   4.61   (1.4-6.9)  


 


Basophils #   0.06   (0-0.4)  


 


Sodium    133 L  (137-145)  mmol/L


 


Potassium    4.2  (3.5-5.1)  mmol/L


 


Chloride    102  ()  mmol/L


 


Carbon Dioxide    23  (22-30)  mmol/L


 


Anion Gap    12.1  (5-15)  MEQ/L


 


BUN    3 L  (9-20)  mg/dL


 


Creatinine    0.76  (0.66-1.25)  mg/dL


 


Estimated GFR    > 60.0  ML/MIN


 


Glucose    139 H  ()  mg/dL


 


Lactic Acid  2.1 H    (0.4-2.0)  


 


Calcium    9.2  (8.4-10.2)  mg/dL


 


Total Bilirubin    1.00  (0.2-1.3)  mg/dL


 


AST    55  (17-59)  U/L


 


ALT    35  (0-50)  U/L


 


Alkaline Phosphatase    120  ()  U/L


 


Troponin I     (0.000-0.034)  ng/mL


 


Serum Total Protein    6.8  (6.3-8.2)  g/dL


 


Albumin    3.8  (3.5-5.0)  g/dL


 


Lipase    144  ()  U/L


 


Urine Color     (YELLOW)  


 


Urine Appearance     (CLEAR)  


 


Urine pH     (5-6)  


 


Ur Specific Gravity     (1.005-1.025)  


 


Urine Protein     (Negative)  


 


Urine Ketones     (NEGATIVE)  


 


Urine Blood     (0-5)  Blake/ul


 


Urine Nitrite     (NEGATIVE)  


 


Urine Bilirubin     (NEGATIVE)  


 


Urine Urobilinogen     (0-1)  mg/dL


 


Ur Leukocyte Esterase     (NEGATIVE)  


 


Urine WBC (Auto)     (0-5)  /HPF


 


Urine RBC (Auto)     (0-2)  /HPF


 


U Epithel Cells (Auto)     (FEW)  /HPF


 


Urine Bacteria (Auto)     (NEGATIVE)  /HPF


 


Urine Mucus (Auto)     (NEGATIVE)  /HPF


 


Urine Culture Reflexed     (NO)  


 


Urine Glucose     (NEGATIVE)  mg/dL


 


Urine Opiates Level     (NEGATIVE)  


 


Ur Methadone     (NEGATIVE)  


 


Urine Barbiturates     (NEGATIVE)  


 


Ur Phencyclidine (PCP)     (NEGATIVE)  


 


Urine Amphetamine     (NEGATIVE)  


 


U Benzodiazepine Level     (NEGATIVE)  


 


Urine Cocaine     (NEGATIVE)  


 


Urine Marijuana (THC)     (NEGATIVE)  


 


Ethyl Alcohol    < 10  (0-10)  mg/dL


 


SARS-CoV-2 (PCR)     (NEGATIVE)  














  06/04/21 06/04/21 06/04/21 Range/Units





  12:29 14:31 15:13 


 


WBC     (4.0-10.5)  K/mm3


 


RBC     (4.1-5.6)  M/mm3


 


Hgb     (12.5-18.0)  gm/dl


 


Hct     (42-50)  %


 


MCV     ()  fl


 


MCH     (26-32)  pg


 


MCHC     (32-36)  g/dl


 


RDW     (11.5-14.0)  %


 


Plt Count     (150-450)  K/mm3


 


MPV     (7.5-11.0)  fl


 


Gran %     (36.0-66.0)  %


 


Eos # (Auto)     (0-0.5)  


 


Absolute Lymphs (auto)     (1.0-4.6)  


 


Absolute Monos (auto)     (0.0-1.3)  


 


Lymphocytes %     (24.0-44.0)  %


 


Monocytes %     (0.0-12.0)  %


 


Eosinophils %     (0.00-5.0)  %


 


Basophils %     (0.0-0.4)  %


 


Absolute Granulocytes     (1.4-6.9)  


 


Basophils #     (0-0.4)  


 


Sodium     (137-145)  mmol/L


 


Potassium     (3.5-5.1)  mmol/L


 


Chloride     ()  mmol/L


 


Carbon Dioxide     (22-30)  mmol/L


 


Anion Gap     (5-15)  MEQ/L


 


BUN     (9-20)  mg/dL


 


Creatinine     (0.66-1.25)  mg/dL


 


Estimated GFR     ML/MIN


 


Glucose     ()  mg/dL


 


Lactic Acid   1.7   (0.4-2.0)  


 


Calcium     (8.4-10.2)  mg/dL


 


Total Bilirubin     (0.2-1.3)  mg/dL


 


AST     (17-59)  U/L


 


ALT     (0-50)  U/L


 


Alkaline Phosphatase     ()  U/L


 


Troponin I  < 0.012   < 0.012  (0.000-0.034)  ng/mL


 


Serum Total Protein     (6.3-8.2)  g/dL


 


Albumin     (3.5-5.0)  g/dL


 


Lipase     ()  U/L


 


Urine Color     (YELLOW)  


 


Urine Appearance     (CLEAR)  


 


Urine pH     (5-6)  


 


Ur Specific Gravity     (1.005-1.025)  


 


Urine Protein     (Negative)  


 


Urine Ketones     (NEGATIVE)  


 


Urine Blood     (0-5)  Blake/ul


 


Urine Nitrite     (NEGATIVE)  


 


Urine Bilirubin     (NEGATIVE)  


 


Urine Urobilinogen     (0-1)  mg/dL


 


Ur Leukocyte Esterase     (NEGATIVE)  


 


Urine WBC (Auto)     (0-5)  /HPF


 


Urine RBC (Auto)     (0-2)  /HPF


 


U Epithel Cells (Auto)     (FEW)  /HPF


 


Urine Bacteria (Auto)     (NEGATIVE)  /HPF


 


Urine Mucus (Auto)     (NEGATIVE)  /HPF


 


Urine Culture Reflexed     (NO)  


 


Urine Glucose     (NEGATIVE)  mg/dL


 


Urine Opiates Level     (NEGATIVE)  


 


Ur Methadone     (NEGATIVE)  


 


Urine Barbiturates     (NEGATIVE)  


 


Ur Phencyclidine (PCP)     (NEGATIVE)  


 


Urine Amphetamine     (NEGATIVE)  


 


U Benzodiazepine Level     (NEGATIVE)  


 


Urine Cocaine     (NEGATIVE)  


 


Urine Marijuana (THC)     (NEGATIVE)  


 


Ethyl Alcohol     (0-10)  mg/dL


 


SARS-CoV-2 (PCR)     (NEGATIVE)  














  06/04/21 06/04/21 06/04/21 Range/Units





  18:02 18:15 21:20 


 


WBC     (4.0-10.5)  K/mm3


 


RBC     (4.1-5.6)  M/mm3


 


Hgb     (12.5-18.0)  gm/dl


 


Hct     (42-50)  %


 


MCV     ()  fl


 


MCH     (26-32)  pg


 


MCHC     (32-36)  g/dl


 


RDW     (11.5-14.0)  %


 


Plt Count     (150-450)  K/mm3


 


MPV     (7.5-11.0)  fl


 


Gran %     (36.0-66.0)  %


 


Eos # (Auto)     (0-0.5)  


 


Absolute Lymphs (auto)     (1.0-4.6)  


 


Absolute Monos (auto)     (0.0-1.3)  


 


Lymphocytes %     (24.0-44.0)  %


 


Monocytes %     (0.0-12.0)  %


 


Eosinophils %     (0.00-5.0)  %


 


Basophils %     (0.0-0.4)  %


 


Absolute Granulocytes     (1.4-6.9)  


 


Basophils #     (0-0.4)  


 


Sodium     (137-145)  mmol/L


 


Potassium     (3.5-5.1)  mmol/L


 


Chloride     ()  mmol/L


 


Carbon Dioxide     (22-30)  mmol/L


 


Anion Gap     (5-15)  MEQ/L


 


BUN     (9-20)  mg/dL


 


Creatinine     (0.66-1.25)  mg/dL


 


Estimated GFR     ML/MIN


 


Glucose     ()  mg/dL


 


Lactic Acid     (0.4-2.0)  


 


Calcium     (8.4-10.2)  mg/dL


 


Total Bilirubin     (0.2-1.3)  mg/dL


 


AST     (17-59)  U/L


 


ALT     (0-50)  U/L


 


Alkaline Phosphatase     ()  U/L


 


Troponin I   < 0.012  < 0.012  (0.000-0.034)  ng/mL


 


Serum Total Protein     (6.3-8.2)  g/dL


 


Albumin     (3.5-5.0)  g/dL


 


Lipase     ()  U/L


 


Urine Color     (YELLOW)  


 


Urine Appearance     (CLEAR)  


 


Urine pH     (5-6)  


 


Ur Specific Gravity     (1.005-1.025)  


 


Urine Protein     (Negative)  


 


Urine Ketones     (NEGATIVE)  


 


Urine Blood     (0-5)  Blake/ul


 


Urine Nitrite     (NEGATIVE)  


 


Urine Bilirubin     (NEGATIVE)  


 


Urine Urobilinogen     (0-1)  mg/dL


 


Ur Leukocyte Esterase     (NEGATIVE)  


 


Urine WBC (Auto)     (0-5)  /HPF


 


Urine RBC (Auto)     (0-2)  /HPF


 


U Epithel Cells (Auto)     (FEW)  /HPF


 


Urine Bacteria (Auto)     (NEGATIVE)  /HPF


 


Urine Mucus (Auto)     (NEGATIVE)  /HPF


 


Urine Culture Reflexed     (NO)  


 


Urine Glucose     (NEGATIVE)  mg/dL


 


Urine Opiates Level     (NEGATIVE)  


 


Ur Methadone     (NEGATIVE)  


 


Urine Barbiturates     (NEGATIVE)  


 


Ur Phencyclidine (PCP)     (NEGATIVE)  


 


Urine Amphetamine     (NEGATIVE)  


 


U Benzodiazepine Level     (NEGATIVE)  


 


Urine Cocaine     (NEGATIVE)  


 


Urine Marijuana (THC)     (NEGATIVE)  


 


Ethyl Alcohol     (0-10)  mg/dL


 


SARS-CoV-2 (PCR)  NEGATIVE    (NEGATIVE)  














  06/04/21 06/04/21 06/05/21 Range/Units





  21:33 21:33 00:20 


 


WBC     (4.0-10.5)  K/mm3


 


RBC     (4.1-5.6)  M/mm3


 


Hgb     (12.5-18.0)  gm/dl


 


Hct     (42-50)  %


 


MCV     ()  fl


 


MCH     (26-32)  pg


 


MCHC     (32-36)  g/dl


 


RDW     (11.5-14.0)  %


 


Plt Count     (150-450)  K/mm3


 


MPV     (7.5-11.0)  fl


 


Gran %     (36.0-66.0)  %


 


Eos # (Auto)     (0-0.5)  


 


Absolute Lymphs (auto)     (1.0-4.6)  


 


Absolute Monos (auto)     (0.0-1.3)  


 


Lymphocytes %     (24.0-44.0)  %


 


Monocytes %     (0.0-12.0)  %


 


Eosinophils %     (0.00-5.0)  %


 


Basophils %     (0.0-0.4)  %


 


Absolute Granulocytes     (1.4-6.9)  


 


Basophils #     (0-0.4)  


 


Sodium     (137-145)  mmol/L


 


Potassium     (3.5-5.1)  mmol/L


 


Chloride     ()  mmol/L


 


Carbon Dioxide     (22-30)  mmol/L


 


Anion Gap     (5-15)  MEQ/L


 


BUN     (9-20)  mg/dL


 


Creatinine     (0.66-1.25)  mg/dL


 


Estimated GFR     ML/MIN


 


Glucose     ()  mg/dL


 


Lactic Acid     (0.4-2.0)  


 


Calcium     (8.4-10.2)  mg/dL


 


Total Bilirubin     (0.2-1.3)  mg/dL


 


AST     (17-59)  U/L


 


ALT     (0-50)  U/L


 


Alkaline Phosphatase     ()  U/L


 


Troponin I    < 0.012  (0.000-0.034)  ng/mL


 


Serum Total Protein     (6.3-8.2)  g/dL


 


Albumin     (3.5-5.0)  g/dL


 


Lipase     ()  U/L


 


Urine Color  LIONEL    (YELLOW)  


 


Urine Appearance  CLEAR    (CLEAR)  


 


Urine pH  7.0    (5-6)  


 


Ur Specific Gravity  1.015    (1.005-1.025)  


 


Urine Protein  30    (Negative)  


 


Urine Ketones  NEGATIVE    (NEGATIVE)  


 


Urine Blood  NEGATIVE    (0-5)  Blake/ul


 


Urine Nitrite  NEGATIVE    (NEGATIVE)  


 


Urine Bilirubin  NEGATIVE    (NEGATIVE)  


 


Urine Urobilinogen  2    (0-1)  mg/dL


 


Ur Leukocyte Esterase  NEGATIVE    (NEGATIVE)  


 


Urine WBC (Auto)  NONE    (0-5)  /HPF


 


Urine RBC (Auto)  NONE    (0-2)  /HPF


 


U Epithel Cells (Auto)  NONE    (FEW)  /HPF


 


Urine Bacteria (Auto)  NONE    (NEGATIVE)  /HPF


 


Urine Mucus (Auto)  SLIGHT    (NEGATIVE)  /HPF


 


Urine Culture Reflexed  NO    (NO)  


 


Urine Glucose  NEGATIVE    (NEGATIVE)  mg/dL


 


Urine Opiates Level   NEGATIVE   (NEGATIVE)  


 


Ur Methadone   NEGATIVE   (NEGATIVE)  


 


Urine Barbiturates   NEGATIVE   (NEGATIVE)  


 


Ur Phencyclidine (PCP)   NEGATIVE   (NEGATIVE)  


 


Urine Amphetamine   NEGATIVE   (NEGATIVE)  


 


U Benzodiazepine Level   NEGATIVE   (NEGATIVE)  


 


Urine Cocaine   NEGATIVE   (NEGATIVE)  


 


Urine Marijuana (THC)   POSITIVE   (NEGATIVE)  


 


Ethyl Alcohol     (0-10)  mg/dL


 


SARS-CoV-2 (PCR)     (NEGATIVE)  














  06/05/21 06/05/21 Range/Units





  05:35 05:35 


 


WBC  6.3   (4.0-10.5)  K/mm3


 


RBC  5.10   (4.1-5.6)  M/mm3


 


Hgb  15.4   (12.5-18.0)  gm/dl


 


Hct  46.8   (42-50)  %


 


MCV  91.8   ()  fl


 


MCH  30.2   (26-32)  pg


 


MCHC  32.9   (32-36)  g/dl


 


RDW  16.8 H   (11.5-14.0)  %


 


Plt Count  125 L   (150-450)  K/mm3


 


MPV  10.5   (7.5-11.0)  fl


 


Gran %  50.8   (36.0-66.0)  %


 


Eos # (Auto)  0.06   (0-0.5)  


 


Absolute Lymphs (auto)  2.41   (1.0-4.6)  


 


Absolute Monos (auto)  0.58   (0.0-1.3)  


 


Lymphocytes %  38.4   (24.0-44.0)  %


 


Monocytes %  9.2   (0.0-12.0)  %


 


Eosinophils %  1.0   (0.00-5.0)  %


 


Basophils %  0.6   (0.0-0.4)  %


 


Absolute Granulocytes  3.19   (1.4-6.9)  


 


Basophils #  0.04   (0-0.4)  


 


Sodium   131 L  (137-145)  mmol/L


 


Potassium   4.0  (3.5-5.1)  mmol/L


 


Chloride   103  ()  mmol/L


 


Carbon Dioxide   24  (22-30)  mmol/L


 


Anion Gap   8.1  (5-15)  MEQ/L


 


BUN   8 L  (9-20)  mg/dL


 


Creatinine   0.83  (0.66-1.25)  mg/dL


 


Estimated GFR   > 60.0  ML/MIN


 


Glucose   100  ()  mg/dL


 


Lactic Acid    (0.4-2.0)  


 


Calcium   8.8  (8.4-10.2)  mg/dL


 


Total Bilirubin    (0.2-1.3)  mg/dL


 


AST    (17-59)  U/L


 


ALT    (0-50)  U/L


 


Alkaline Phosphatase    ()  U/L


 


Troponin I    (0.000-0.034)  ng/mL


 


Serum Total Protein    (6.3-8.2)  g/dL


 


Albumin    (3.5-5.0)  g/dL


 


Lipase    ()  U/L


 


Urine Color    (YELLOW)  


 


Urine Appearance    (CLEAR)  


 


Urine pH    (5-6)  


 


Ur Specific Gravity    (1.005-1.025)  


 


Urine Protein    (Negative)  


 


Urine Ketones    (NEGATIVE)  


 


Urine Blood    (0-5)  Blake/ul


 


Urine Nitrite    (NEGATIVE)  


 


Urine Bilirubin    (NEGATIVE)  


 


Urine Urobilinogen    (0-1)  mg/dL


 


Ur Leukocyte Esterase    (NEGATIVE)  


 


Urine WBC (Auto)    (0-5)  /HPF


 


Urine RBC (Auto)    (0-2)  /HPF


 


U Epithel Cells (Auto)    (FEW)  /HPF


 


Urine Bacteria (Auto)    (NEGATIVE)  /HPF


 


Urine Mucus (Auto)    (NEGATIVE)  /HPF


 


Urine Culture Reflexed    (NO)  


 


Urine Glucose    (NEGATIVE)  mg/dL


 


Urine Opiates Level    (NEGATIVE)  


 


Ur Methadone    (NEGATIVE)  


 


Urine Barbiturates    (NEGATIVE)  


 


Ur Phencyclidine (PCP)    (NEGATIVE)  


 


Urine Amphetamine    (NEGATIVE)  


 


U Benzodiazepine Level    (NEGATIVE)  


 


Urine Cocaine    (NEGATIVE)  


 


Urine Marijuana (THC)    (NEGATIVE)  


 


Ethyl Alcohol    (0-10)  mg/dL


 


SARS-CoV-2 (PCR)    (NEGATIVE)  














- Radiology Impressions


Radiology Exams & Impressions: 


                              Radiology Procedures











 Category Date Time Status


 


 CHEST 1 VIEW (PORTABLE) Stat Exams  06/04/21 12:16 Completed


 


 ELBOW (MINIMUM 3 VIEWS) Stat Exams  06/04/21 13:06 Completed


 


 HEAD WITHOUT CONTRAST [CT] Stat Exams  06/04/21 11:52 Completed


 


 WRIST (MIN 3 VIEWS) Stat Exams  06/04/21 13:06 Completed














- Other Procedures and Tests


                               Respiratory Therapy





06/05/21 11:14


EKG ROUTINE 














Assessment/Plan


(1) Atrial fibrillation, new onset


Current Visit: Yes   Status: Acute   


Assessment & Plan: 


rate is currently controlled with addition of metoprolol, received lovenox x 1 

last night, will transition to eliquis today. check tsh, troponin negative. will

 obtain echo


Code(s): I48.91 - UNSPECIFIED ATRIAL FIBRILLATION   





(2) AMS (altered mental status)


Current Visit: Yes   Status: Acute   


Qualifiers: 


   Altered mental status type: unspecified   Qualified Code(s): R41.82 - Altered

 mental status, unspecified   


Code(s): R41.82 - ALTERED MENTAL STATUS, UNSPECIFIED   





(3) Weakness of right upper extremity


Current Visit: No   Status: Acute   Code(s): M62.81 - MUSCLE WEAKNESS 

(GENERALIZED)   





(4) Alcohol abuse


Current Visit: No   Status: Chronic   


Assessment & Plan: 


on detox protocol, neuro deficits and alcohol use cloud picture substantially 

but he reports last drink was 2 days ago so will watch closely for detox 

signs/symptoms


Code(s): F10.10 - ALCOHOL ABUSE, UNCOMPLICATED   





(5) History of CVA (cerebrovascular accident)


Current Visit: No   Status: Chronic   


Assessment & Plan: 


patient agrees to rehab stay, appears unable to care for himself.


Code(s): Z86.73 - PRSNL HX OF TIA (TIA), AND CEREB INFRC W/O RESID DEFICITS

## 2021-06-06 LAB
ALBUMIN SERPL-MCNC: 3.1 G/DL (ref 3.5–5)
ALP SERPL-CCNC: 88 U/L (ref 38–126)
ALT SERPL-CCNC: 32 U/L (ref 0–50)
ANION GAP SERPL CALC-SCNC: 8.1 MEQ/L (ref 5–15)
AST SERPL QL: 43 U/L (ref 17–59)
BASOPHILS # BLD AUTO: 0.03 10*3/UL (ref 0–0.4)
BASOPHILS NFR BLD AUTO: 0.5 % (ref 0–0.4)
BILIRUB BLD-MCNC: 0.7 MG/DL (ref 0.2–1.3)
BUN SERPL-MCNC: 7 MG/DL (ref 9–20)
CALCIUM SPEC-MCNC: 9 MG/DL (ref 8.4–10.2)
CHLORIDE SERPL-SCNC: 106 MMOL/L (ref 98–107)
CO2 SERPL-SCNC: 24 MMOL/L (ref 22–30)
CREAT SERPL-MCNC: 0.83 MG/DL (ref 0.66–1.25)
EOSINOPHIL # BLD AUTO: 0.08 10*3/UL (ref 0–0.5)
GFR SERPLBLD BASED ON 1.73 SQ M-ARVRAT: > 60 ML/MIN
GLUCOSE SERPL-MCNC: 151 MG/DL (ref 74–106)
HCT VFR BLD AUTO: 46.4 % (ref 42–50)
HGB BLD-MCNC: 15.2 GM/DL (ref 12.5–18)
LYMPHOCYTES # SPEC AUTO: 1.91 10*3/UL (ref 1–4.6)
MAGNESIUM SERPL-MCNC: 2.3 MG/DL (ref 1.6–2.3)
MCH RBC QN AUTO: 30 PG (ref 26–32)
MCHC RBC AUTO-ENTMCNC: 32.8 G/DL (ref 32–36)
MONOCYTES # BLD AUTO: 0.49 10*3/UL (ref 0–1.3)
PLATELET # BLD AUTO: 128 K/MM3 (ref 150–450)
POTASSIUM SERPLBLD-SCNC: 3.6 MMOL/L (ref 3.5–5.1)
PROT SERPL-MCNC: 5.8 G/DL (ref 6.3–8.2)
RBC # BLD AUTO: 5.07 M/MM3 (ref 4.1–5.6)
SODIUM SERPL-SCNC: 134 MMOL/L (ref 137–145)
WBC # BLD AUTO: 5.5 K/MM3 (ref 4–10.5)

## 2021-06-06 RX ADMIN — FOLIC ACID SCH MG: 1 TABLET ORAL at 09:34

## 2021-06-06 RX ADMIN — Medication SCH MG: at 09:34

## 2021-06-06 RX ADMIN — BACLOFEN SCH MG: 10 TABLET ORAL at 16:33

## 2021-06-06 RX ADMIN — ATORVASTATIN CALCIUM SCH MG: 40 TABLET, FILM COATED ORAL at 22:03

## 2021-06-06 RX ADMIN — SERTRALINE SCH MG: 50 TABLET, FILM COATED ORAL at 09:34

## 2021-06-06 RX ADMIN — THERA TABS SCH TAB: TAB at 09:34

## 2021-06-06 RX ADMIN — APIXABAN SCH MG: 2.5 TABLET, FILM COATED ORAL at 22:03

## 2021-06-06 RX ADMIN — PANTOPRAZOLE SODIUM SCH MG: 40 TABLET, DELAYED RELEASE ORAL at 09:34

## 2021-06-06 RX ADMIN — BACLOFEN SCH MG: 10 TABLET ORAL at 22:03

## 2021-06-06 RX ADMIN — LISINOPRIL SCH MG: 5 TABLET ORAL at 09:33

## 2021-06-06 RX ADMIN — TAMSULOSIN HYDROCHLORIDE SCH MG: 0.4 CAPSULE ORAL at 09:33

## 2021-06-06 RX ADMIN — BACLOFEN SCH MG: 10 TABLET ORAL at 09:34

## 2021-06-06 RX ADMIN — APIXABAN SCH MG: 2.5 TABLET, FILM COATED ORAL at 09:34

## 2021-06-06 RX ADMIN — METOPROLOL SUCCINATE SCH MG: 50 TABLET, EXTENDED RELEASE ORAL at 09:34

## 2021-06-06 NOTE — PCM.NOTE
Date and Time: 06/06/21 0812





Subjective Assessment: 





patient is much more alert and conversant, he notes he is thinking and speaking 

more clearly and doing better. he is taking po, has no complaints today





Objective Exam


General Appearance: no apparent distress, alert


Neurologic Exam: motor deficits


Respiratory Exam: normal breath sounds, lungs clear, No respiratory distress


Cardiovascular Exam: regular rate/rhythm, normal heart sounds


Gastrointestinal/Abdomen Exam: soft, No tenderness, No mass


Extremity Exam: normal inspection, normal range of motion





OBJECTIVE DATA


Vital Signs: 


                               Vital Signs - 24 hr











  Temp Pulse Resp BP Pulse Ox


 


 06/06/21 07:45  97.0 F  99 H  17  109/66  96


 


 06/06/21 04:00  98.2 F  96 H  20  111/57  94 L


 


 06/06/21 00:07  98.3 F  94 H  20  123/66  95


 


 06/05/21 20:00  98.1 F  95 H  19  118/74  95


 


 06/05/21 16:00  98.6 F  92 H  16  146/85  94 L


 


 06/05/21 12:00  98.2 F  88  16  117/74  90 L








                        Pain Assessment - Last Documented











Pain Intensity                 0











Intake and Output: 


                                 Intake & Output











 06/03/21 06/04/21 06/05/21 06/06/21





 11:59 11:59 11:59 11:59


 


Intake Total   1105 1080


 


Balance   1105 1080


 


Weight   115.6 kg 116.2 kg











Lab Results: 


                            Lab Results-Last 24 Hours











  06/05/21 06/06/21 06/06/21 Range/Units





  06:00 05:40 05:40 


 


WBC   5.5   (4.0-10.5)  K/mm3


 


RBC   5.07   (4.1-5.6)  M/mm3


 


Hgb   15.2   (12.5-18.0)  gm/dl


 


Hct   46.4   (42-50)  %


 


MCV   91.5   ()  fl


 


MCH   30.0   (26-32)  pg


 


MCHC   32.8   (32-36)  g/dl


 


RDW   15.9 H   (11.5-14.0)  %


 


Plt Count   128 L   (150-450)  K/mm3


 


MPV   10.7   (7.5-11.0)  fl


 


Gran %   54.8   (36.0-66.0)  %


 


Eos # (Auto)   0.08   (0-0.5)  


 


Absolute Lymphs (auto)   1.91   (1.0-4.6)  


 


Absolute Monos (auto)   0.49   (0.0-1.3)  


 


Lymphocytes %   34.5   (24.0-44.0)  %


 


Monocytes %   8.8   (0.0-12.0)  %


 


Eosinophils %   1.4   (0.00-5.0)  %


 


Basophils %   0.5   (0.0-0.4)  %


 


Absolute Granulocytes   3.03   (1.4-6.9)  


 


Basophils #   0.03   (0-0.4)  


 


Sodium    134 L  (137-145)  mmol/L


 


Potassium    3.6  (3.5-5.1)  mmol/L


 


Chloride    106  ()  mmol/L


 


Carbon Dioxide    24  (22-30)  mmol/L


 


Anion Gap    8.1  (5-15)  MEQ/L


 


BUN    7 L  (9-20)  mg/dL


 


Creatinine    0.83  (0.66-1.25)  mg/dL


 


Estimated GFR    > 60.0  ML/MIN


 


Glucose    151 H  ()  mg/dL


 


Calcium    9.0  (8.4-10.2)  mg/dL


 


Magnesium    2.3  (1.6-2.3)  mg/dL


 


Total Bilirubin    0.70  (0.2-1.3)  mg/dL


 


AST    43  (17-59)  U/L


 


ALT    32  (0-50)  U/L


 


Alkaline Phosphatase    88  ()  U/L


 


Serum Total Protein    5.8 L  (6.3-8.2)  g/dL


 


Albumin    3.1 L  (3.5-5.0)  g/dL


 


TSH 3rd Generation  3.660    (0.47-4.68)  mIU/L











Radiology Exams: 


                              Radiology Procedures











 Category Date Time Status


 


 CHEST 1 VIEW (PORTABLE) Stat Exams  06/04/21 12:16 Completed


 


 ECHO W/2D AND DOPPLER [US] Routine Exams  06/07/21 Ordered


 


 ELBOW (MINIMUM 3 VIEWS) Stat Exams  06/04/21 13:06 Completed


 


 HEAD WITHOUT CONTRAST [CT] Stat Exams  06/04/21 11:52 Completed


 


 WRIST (MIN 3 VIEWS) Stat Exams  06/04/21 13:06 Completed














Assessment/Plan


(1) Atrial fibrillation, new onset


Current Visit: Yes   Status: Acute   


Assessment & Plan: 


rate is controlled with toprol, started on eliquis


Code(s): I48.91 - UNSPECIFIED ATRIAL FIBRILLATION   





(2) AMS (altered mental status)


Current Visit: Yes   Status: Acute   


Qualifiers: 


   Altered mental status type: unspecified   Qualified Code(s): R41.82 - Altered

 mental status, unspecified   


Code(s): R41.82 - ALTERED MENTAL STATUS, UNSPECIFIED   





(3) Weakness of right upper extremity


Current Visit: No   Status: Acute   Code(s): M62.81 - MUSCLE WEAKNESS 

(GENERALIZED)   





(4) Alcohol abuse


Current Visit: No   Status: Chronic   Code(s): F10.10 - ALCOHOL ABUSE, 

UNCOMPLICATED   





(5) History of CVA (cerebrovascular accident)


Current Visit: No   Status: Chronic   Code(s): Z86.73 - PRSNL HX OF TIA (TIA), 

AND CEREB INFRC W/O RESID DEFICITS

## 2021-06-07 VITALS — SYSTOLIC BLOOD PRESSURE: 134 MMHG | OXYGEN SATURATION: 96 % | HEART RATE: 92 BPM | DIASTOLIC BLOOD PRESSURE: 81 MMHG

## 2021-06-07 RX ADMIN — APIXABAN SCH MG: 2.5 TABLET, FILM COATED ORAL at 10:02

## 2021-06-07 RX ADMIN — TAMSULOSIN HYDROCHLORIDE SCH MG: 0.4 CAPSULE ORAL at 10:02

## 2021-06-07 RX ADMIN — FOLIC ACID SCH MG: 1 TABLET ORAL at 10:01

## 2021-06-07 RX ADMIN — BACLOFEN SCH MG: 10 TABLET ORAL at 10:01

## 2021-06-07 RX ADMIN — Medication SCH MG: at 10:01

## 2021-06-07 RX ADMIN — THERA TABS SCH TAB: TAB at 10:02

## 2021-06-07 RX ADMIN — LISINOPRIL SCH MG: 5 TABLET ORAL at 10:02

## 2021-06-07 RX ADMIN — METOPROLOL SUCCINATE SCH MG: 50 TABLET, EXTENDED RELEASE ORAL at 10:01

## 2021-06-07 RX ADMIN — SERTRALINE SCH MG: 50 TABLET, FILM COATED ORAL at 10:01

## 2021-06-07 RX ADMIN — PANTOPRAZOLE SODIUM SCH MG: 40 TABLET, DELAYED RELEASE ORAL at 10:02

## 2021-06-07 NOTE — PCM.DS
Discharge Summary


Date of Admission: 


06/04/21 20:23





Admitting Physician: 


ALVARO HERNANDEZ





Primary Care Provider: 


ALVARO HERNANDEZ








Allergies


Allergies





No Known Drug Allergies Allergy (Verified 06/04/21 12:17)


   











Hospital Summary





- Hospital Course


Hospital Course: 





patient was admitted, he has a history of prior CVA with deficits, alcohol 

abuse. he has new onset a fib, rate controlled with toprol and started on 

eliquis. was on detox protocol but no overt signs of withdrawal. he was unkempt 

and had poor hygiene on arrival, initially agreed to rehab stay but now refuses,

will return home with home health and refuses alcohol treatment. he is able to 

make his own decisions.





- Vitals & Intake/Output


Vital Signs: 





                                   Vital Signs











Temperature  97.5 F   06/07/21 08:00


 


Pulse Rate  92 H  06/07/21 08:00


 


Respiratory Rate  18   06/07/21 08:00


 


Blood Pressure  134/81   06/07/21 08:00


 


O2 Sat by Pulse Oximetry  96   06/07/21 08:00











Intake & Output: 





                                 Intake & Output











 06/04/21 06/05/21 06/06/21 06/07/21





 11:59 11:59 11:59 11:59


 


Intake Total  1105 1080 1360


 


Output Total    425


 


Balance  1105 1080 935


 


Weight  115.6 kg 116.2 kg 114 kg














- Lab


Result Diagrams: 


                                 06/06/21 05:40





                                 06/06/21 05:40





- Radiology Exams


Ordered Rad Exams-Entire Visit: 





                              Radiology Procedures











 Category Date Time Status


 


 ECHO W/2D AND DOPPLER [US] Routine Exams  06/07/21 Ordered














- Procedures and Test


Procedures and Tests throughout Hospitalization: 





                            Therapy Orders & Screens





06/04/21 21:56


PT Screen per Nursing Assess ONCE 


   Comment: Protocol Order


   Physician Instructions: Greater than 3 points order PT Admission Screenin


   Reason For Exam: Triggered on Admission


   Diagnosis: AMS


   Open Wound/Cellutlitis/Pressure Ulcers: No


   Acute Fx/ORIF/Change in wt bearing status: No


   Severe MUSCULOSKELETAL pain: Yes: right arm/hx of CVA


   ADL Dysfunction: Yes


   Acute CVA w/Hemiparesis/Hemiplegia: Yes


   Decreased Functional Mobility/Strength: Yes


   Sprain/Strain: No


   Acute Post-op Mobility Dysfunction: No


   Total Points: 14


Smoking Cessation Education ONCE 


   Comment: 


   Diagnosis: AMS


   Smoking Status: Current every day smoker


   How long have you smoked: mostoflife


   Have you smoked in the past 12 months: Yes


   Approximately how many cigarettes per day: 10


   Do you dip or chew tobacco: No


   If,Former Smoker,when did you quit: FEW YRS AGO





06/05/21 11:14


EKG ROUTINE 


   Comment: 


   Diagnosis: AMS





06/05/21 11:45


OT Eval and Treat (MD Order) ROUTINE 


   Comment: 


   Consulting Provider: 


   Physician Instructions: 


   Reason For Exam: 


   Diagnosis: AMS


PT Eval & Treat (MD Order) ONCE 


   Reason for Eval:: cva, right upper ext weakness, falls


   Diagnosis: AMS














Discharge Exam


General Appearance: no apparent distress, alert


Neurologic Exam: motor deficits


Respiratory Exam: normal breath sounds, lungs clear, No respiratory distress


Cardiovascular Exam: regular rate/rhythm, normal heart sounds


Gastrointestinal/Abdomen Exam: soft, No tenderness, No mass


Skin Exam: normal color, warm, dry





Final Diagnosis/Problem List





- Final Discharge Diagnosis/Problem


(1) Atrial fibrillation, new onset


Current Visit: Yes   Status: Acute   Code(s): I48.91 - UNSPECIFIED ATRIAL 

FIBRILLATION   





(2) Weakness of right upper extremity


Current Visit: No   Status: Acute   Code(s): M62.81 - MUSCLE WEAKNESS 

(GENERALIZED)   





(3) Alcohol abuse


Current Visit: No   Status: Chronic   


Assessment & Plan: 


concern with living/social situation. APS alerted by discharge planning.


Code(s): F10.10 - ALCOHOL ABUSE, UNCOMPLICATED   





(4) History of CVA (cerebrovascular accident)


Current Visit: No   Status: Chronic   Code(s): Z86.73 - PRSNL HX OF TIA (TIA), 

AND CEREB INFRC W/O RESID DEFICITS   





- Discharge


Disposition: Home, Self-Care


Condition: Stable


Prescriptions: 


New


   Aspirin EC 81 mg*** [Ecotrin 81 mg***] 81 mg PO DAILY #30 tablet


   Apixaban [Eliquis] 5 mg PO BID #60 tablet


   Metoprolol Succinate 50 mg*** [Toprol Xl 50 MG***] 50 mg PO DAILY #30 tabl

et.sa





Continue


   Tamsulosin HCl 0.4 mg*** [Flomax 0.4 MG***] 0.4 mg PO DAILY


   Lisinopril 5 mg*** [Zestril 5 MG***] 5 mg PO DAILY


   Atorvastatin Calcium [Lipitor] 80 mg PO DAILY


   Baclofen 10 mg*** [Lioresal 10 mg***] 10 mg PO TID


   PANTOPRAZOLE 40 mg Tablet*** [Protonix 40MG Tablet***] 40 mg PO QAM #30 tab


   Sertraline HCl 50 mg** [Zoloft 50 mg Tablet**] 150 mg PO DAILY #90 tab


   Ondansetron ODT 4 MG*** [Zofran Odt 4 mg***] 4 mg PO Q6H PRN PRN #10 

tab.rapdis


     PRN Reason: Vomiting





Discontinued


   Aspirin  mg*** [Ecotrin 325 MG***] 325 mg PO DAILY #30 tablet.ec


Follow up with: 


ALVARO HERNANDEZ [Primary Care Provider] - 1 Week

## 2021-06-07 NOTE — PCM.NOTE
Date and Time: 06/07/21  0820





Subjective Assessment: 





patient is stable, feeling better. denies any acute problems, agrees to rehab 

stay due to difficulty caring for himself, prior stroke with deficits and new a 

fib





Objective Exam


General Appearance: no apparent distress, alert


Neurologic Exam: motor deficits


Respiratory Exam: normal breath sounds, lungs clear, No respiratory distress


Cardiovascular Exam: regular rate/rhythm, normal heart sounds


Gastrointestinal/Abdomen Exam: soft, No tenderness, No mass


Extremity Exam: normal inspection, normal range of motion





OBJECTIVE DATA


Vital Signs: 


                               Vital Signs - 24 hr











  Temp Pulse Resp BP Pulse Ox


 


 06/07/21 04:00  98.2 F  78  20  142/73  95


 


 06/07/21 00:10  98.1 F  69  18  111/64  95


 


 06/06/21 20:00  98.5 F  72  20  103/72  96


 


 06/06/21 16:00  97.0 F  87  18  111/82  98


 


 06/06/21 12:00  96.2 F  100 H  18  138/80  94 L








                        Pain Assessment - Last Documented











Pain Intensity                 2











Intake and Output: 


                                 Intake & Output











 06/04/21 06/05/21 06/06/21 06/07/21





 11:59 11:59 11:59 11:59


 


Intake Total  1105 1080 1120


 


Output Total    225


 


Balance  1105 1080 895


 


Weight  115.6 kg 116.2 kg 114 kg











Radiology Exams: 


                              Radiology Procedures











 Category Date Time Status


 


 ECHO W/2D AND DOPPLER [US] Routine Exams  06/07/21 Ordered














Assessment/Plan


(1) Atrial fibrillation, new onset


Current Visit: Yes   Status: Acute   


Assessment & Plan: 


rate controlled, on eliquis, await ecf placement


Code(s): I48.91 - UNSPECIFIED ATRIAL FIBRILLATION   





(2) AMS (altered mental status)


Current Visit: Yes   Status: Acute   


Qualifiers: 


   Altered mental status type: unspecified   Qualified Code(s): R41.82 - Altered

 mental status, unspecified   


Code(s): R41.82 - ALTERED MENTAL STATUS, UNSPECIFIED   





(3) Weakness of right upper extremity


Current Visit: No   Status: Acute   Code(s): M62.81 - MUSCLE WEAKNESS (GENER

ALIZED)   





(4) Alcohol abuse


Current Visit: No   Status: Chronic   Code(s): F10.10 - ALCOHOL ABUSE, 

UNCOMPLICATED   





(5) History of CVA (cerebrovascular accident)


Current Visit: No   Status: Chronic   Code(s): Z86.73 - PRSNL HX OF TIA (TIA), 

AND CEREB INFRC W/O RESID DEFICITS

## 2021-06-08 NOTE — ECHO
Transthoracic echocardiographic examination and color Doppler was done on 06/07/2021. 



INDICATION:  History of hypertension, stroke and atrial fibrillation.    



IMPRESSION: 

1) NO REGIONAL WALL MOTION ABNORMALITY.  ESTIMATED GLOBAL LEFT VENTRICULAR EJECTION 
FRACTION OF AROUND 60%. 

2) MILD AORTIC REGURGITATION. 

3) TRACE TRICUSPID REGURGITATION. 

4) LEFT VENTRICULAR HYPERTROPHY. 

5) LEFT ATRIAL ENLARGEMENT. 

6) MILDLY DILATED RIGHT SIDED CHAMBERS. 

7) MILDLY DILATED AORTIC ROOT. 



The left ventricle is visualized and demonstrated adequate motion of all the segments. 
Estimated global left ventricular ejection fraction around 60%. There is concentric left 
ventricular hypertrophy. The mitral valve is seen and this opens adequately. There is no 
significant mitral regurgitation. Left atrium is mildly enlarged. The aortic valve opens 
adequately. There is mild aortic regurgitation. The right side chambers are mildly 
dilated. There is trace tricuspid regurgitation. The aortic root appears to be mildly 
dilated.

## 2021-07-12 ENCOUNTER — HOSPITAL ENCOUNTER (EMERGENCY)
Dept: HOSPITAL 33 - ED | Age: 61
Discharge: ANOTHER HOSPITAL | End: 2021-07-12
Payer: MEDICARE

## 2021-07-12 VITALS — OXYGEN SATURATION: 95 %

## 2021-07-12 VITALS — DIASTOLIC BLOOD PRESSURE: 92 MMHG | HEART RATE: 71 BPM | SYSTOLIC BLOOD PRESSURE: 138 MMHG

## 2021-07-12 DIAGNOSIS — N13.2: Primary | ICD-10-CM

## 2021-07-12 DIAGNOSIS — K76.0: ICD-10-CM

## 2021-07-12 DIAGNOSIS — I71.4: ICD-10-CM

## 2021-07-12 DIAGNOSIS — N13.4: ICD-10-CM

## 2021-07-12 DIAGNOSIS — Z79.899: ICD-10-CM

## 2021-07-12 DIAGNOSIS — M47.819: ICD-10-CM

## 2021-07-12 LAB
ALBUMIN SERPL-MCNC: 3.7 G/DL (ref 3.5–5)
ALP SERPL-CCNC: 132 U/L (ref 38–126)
ALT SERPL-CCNC: 34 U/L (ref 0–50)
ANION GAP SERPL CALC-SCNC: 13.5 MEQ/L (ref 5–15)
AST SERPL QL: 54 U/L (ref 17–59)
BASOPHILS # BLD AUTO: 0.02 10*3/UL (ref 0–0.4)
BASOPHILS NFR BLD AUTO: 0.3 % (ref 0–0.4)
BILIRUB BLD-MCNC: 0.8 MG/DL (ref 0.2–1.3)
BUN SERPL-MCNC: 8 MG/DL (ref 9–20)
CALCIUM SPEC-MCNC: 9.5 MG/DL (ref 8.4–10.2)
CHLORIDE SERPL-SCNC: 104 MMOL/L (ref 98–107)
CK SERPL-CCNC: 48 U/L (ref 55–170)
CO2 SERPL-SCNC: 20 MMOL/L (ref 22–30)
CREAT SERPL-MCNC: 0.96 MG/DL (ref 0.66–1.25)
EOSINOPHIL # BLD AUTO: 0.04 10*3/UL (ref 0–0.5)
GFR SERPLBLD BASED ON 1.73 SQ M-ARVRAT: > 60 ML/MIN
GLUCOSE SERPL-MCNC: 142 MG/DL (ref 74–106)
HCT VFR BLD AUTO: 54.7 % (ref 42–50)
HGB BLD-MCNC: 18.5 GM/DL (ref 12.5–18)
LYMPHOCYTES # SPEC AUTO: 2.02 10*3/UL (ref 1–4.6)
MCH RBC QN AUTO: 30.5 PG (ref 26–32)
MCHC RBC AUTO-ENTMCNC: 33.8 G/DL (ref 32–36)
MONOCYTES # BLD AUTO: 0.65 10*3/UL (ref 0–1.3)
PLATELET # BLD AUTO: 132 K/MM3 (ref 150–450)
POTASSIUM SERPLBLD-SCNC: 3.8 MMOL/L (ref 3.5–5.1)
PROT SERPL-MCNC: 7 G/DL (ref 6.3–8.2)
RBC # BLD AUTO: 6.06 M/MM3 (ref 4.1–5.6)
SODIUM SERPL-SCNC: 134 MMOL/L (ref 137–145)
WBC # BLD AUTO: 7.8 K/MM3 (ref 4–10.5)

## 2021-07-12 PROCEDURE — 82550 ASSAY OF CK (CPK): CPT

## 2021-07-12 PROCEDURE — 74177 CT ABD & PELVIS W/CONTRAST: CPT

## 2021-07-12 PROCEDURE — 36000 PLACE NEEDLE IN VEIN: CPT

## 2021-07-12 PROCEDURE — 99285 EMERGENCY DEPT VISIT HI MDM: CPT

## 2021-07-12 PROCEDURE — 36415 COLL VENOUS BLD VENIPUNCTURE: CPT

## 2021-07-12 PROCEDURE — 80053 COMPREHEN METABOLIC PANEL: CPT

## 2021-07-12 PROCEDURE — 85025 COMPLETE CBC W/AUTO DIFF WBC: CPT

## 2021-07-12 NOTE — ERPHSYRPT
- History of Present Illness


Time Seen by Provider: 07/12/21 15:38


Source: patient


Exam Limitations: no limitations


Patient Subjective Stated Complaint: pt here for possible blood in urine, he 

states he is only voiding small amounts, pt is a poor historian due to a stroke,

he states he lives on hes own


Triage Nursing Assessment: pt alert, resp easy, skin w/p/d, face mask in place, 

abd soft, pt attempted x2 to void without success. pt is flaccid to right side 

from a stroke


Physician History: 


Patient is a 60-year-old male presents for emergency department the EMS for 

evaluation of discolored urine.  Patient suspects it may be blood.  Patient 

states he been voiding only small amounts.  It is unclear exactly how much he 

has been voiding.  Patient is a poor historian.  Per report patient has a 

history of a stroke.  The stroke has affected his left side.  He has residual 

left-sided paralysis.  Patient denies active pain.  No chest pain or shortness 

of breath.  No abdominal pain.  No nausea or vomiting.  No diarrhea.  Symptoms 

are mild to moderate in intensity.  No specific worsening improving factors.  

Patient voices no other complaints or concerns at this time.





Timing/Duration: today


Severity: mild


Modifying Factors: Improves With: nothing


Associated Symptoms: denies symptoms, No nausea, No vomiting, No abdominal pain,

No shortness of breath, No diaphoresis, No cough, No chills, No chest pain, No 

fever, No headaches, No loss of appetite, No syncope, No seizure


Allergies/Adverse Reactions: 








No Known Drug Allergies Allergy (Verified 06/04/21 12:17)


   





Home Medications: 








Atorvastatin Calcium [Lipitor] 80 mg PO DAILY 01/11/18 [History]


Lisinopril 5 mg*** [Zestril 5 MG***] 5 mg PO DAILY 01/11/18 [History]


Tamsulosin HCl 0.4 mg*** [Flomax 0.4 MG***] 0.4 mg PO DAILY 01/11/18 [History]


Baclofen 10 mg*** [Lioresal 10 mg***] 10 mg PO TID 08/10/19 [History]





Hx Tetanus, Diphtheria Vaccination/Date Given: Yes (2018)


Hx Influenza Vaccination/Date Given: No


Hx Pneumococcal Vaccination/Date Given: No


Immunizations Up to Date: Yes





Travel Risk





- International Travel


Have you traveled outside of the country in past 3 weeks: No





- Coronavirus Screening


Are you exhibiting any of the following symptoms?: No


Close contact with a COVID-19 positive Pt in past 14-21 Days: No





- Vaccine Status


Have you recieved a Covid-19 vaccination: No





- Review of Systems


Constitutional: No Symptoms, No Fever, No Chills


Eyes: No Symptoms


Ears, Nose, & Throat: No Symptoms


Respiratory: No Symptoms, No Cough, No Dyspnea


Cardiac: No Symptoms, No Chest Pain, No Edema, No Syncope


Abdominal/Gastrointestinal: No Symptoms, No Abdominal Pain, No Nausea, No 

Vomiting, No Diarrhea


Genitourinary Symptoms: No Symptoms, No Dysuria


Musculoskeletal: No Symptoms, No Back Pain, No Neck Pain


Skin: No Symptoms, No Rash


Neurological: No Symptoms, No Dizziness, No Focal Weakness, No Sensory Changes


Psychological: No Symptoms


Endocrine: No Symptoms


Hematologic/Lymphatic: No Symptoms


Immunological/Allergic: No Symptoms


All Other Systems: Reviewed and Negative





- Past Medical History


Pertinent Past Medical History: Yes


Neurological History: Stroke


ENT History: No Pertinent History


Cardiac History: No Pertinent History


Respiratory History: No Pertinent History


Endocrine Medical History: No Pertinent History


Musculoskeletal History: No Pertinent History


GI Medical History: No Pertinent History


 History: No Pertinent History


Psycho-Social History: No Pertinent History


Male Reproductive Disorders: Prostate Problems


Other Medical History: REPORTS RHUEMATIC FEVER AND HEPITITIS WHEN HE WAS YOUNG 

BUT OTHERISE NONSIGNIFICANT.





- Past Surgical History


Past Surgical History: Yes


Neuro Surgical History: No Pertinent History


Cardiac: No Pertinent History


Respiratory: No Pertinent History


Gastrointestinal: No Pertinent History


Genitourinary: No Pertinent History


Musculoskeletal: Orthopedic Surgery


Male Surgical History: No Pertinent History


Other Surgical History: Colonoscopy





- Social History


Smoking Status: Current every day smoker


How long have you smoked: mostoflife


Exposure to second hand smoke: Yes


Drug Use: none


Patient Lives Alone: Yes





- Nursing Vital Signs


Nursing Vital Signs: 


                               Initial Vital Signs











Respiratory Rate  16   07/12/21 15:34


 


Blood Pressure  144/93   07/12/21 15:34


 


O2 Sat by Pulse Oximetry  95   07/12/21 15:34








                                   Pain Scale











Pain Intensity                 0

















- Physical Exam


General Appearance: no apparent distress, alert


Eye Exam: PERRL/EOMI, eyes nml inspection


Ears, Nose, Throat Exam: normal ENT inspection, TMs normal, pharynx normal, 

moist mucous membranes


Neck Exam: normal inspection, non-tender, supple, full range of motion


Respiratory Exam: normal breath sounds, lungs clear, No respiratory distress


Cardiovascular Exam: regular rate/rhythm, normal heart sounds, normal peripheral

pulses


Gastrointestinal/Abdomen Exam: soft, normal bowel sounds, No tenderness, No mass


Back Exam: normal inspection, normal range of motion, No CVA tenderness, No 

vertebral tenderness


Extremity Exam: normal inspection, normal range of motion, pelvis stable


Neurologic Exam: alert, oriented x 3, cooperative, normal mood/affect, nml 

cerebellar function, nml station & gait, sensation nml, No motor deficits


Skin Exam: normal color, warm, dry, No rash


Lymphatic Exam: No adenopathy


**SpO2 Interpretation**: normal


SpO2: 95


O2 Delivery: Room Air





- Course


Nursing assessment & vital signs reviewed: Yes





- CT Exams


  ** Abdomen/Pelvis


CT Interpretation: Tele-radiologist Report (Distal left ureteral stone measuring

7.4 mm in diameter left-sided hydronephrosis hydroureter.  Nephrolithiasis.  

Stable 3.9 cm diameter AAA hepatic steatosis small calcification of the distal 

pancreatic tail atherosclerotic vascular calcification inguinal hernias)


Ordered Tests: 


                               Active Orders 24 hr











 Category Date Time Status


 


 IV Insertion STAT Care  07/12/21 15:32 Active


 


 ABDOMEN AND PELVIS W CONTRAST [CT] Stat Exams  07/12/21 15:32 Completed


 


 CBC W DIFF Stat Lab  07/12/21 15:53 Completed


 


 CK (IN-HOUSE) [CK-Creatinine Phosphokinase] Stat Lab  07/12/21 15:53 Completed


 


 CMP Stat Lab  07/12/21 15:53 Completed


 


 UA W/RFX UR CULTURE Stat Lab  07/12/21 18:21 Ordered








Medication Summary














Discontinued Medications














Generic Name Dose Route Start Last Admin





  Trade Name Brianna  PRN Reason Stop Dose Admin


 


Sodium Chloride  1,000 mls @ 999 mls/hr  07/12/21 15:32  07/12/21 18:30





  Sodium Chloride 0.9% 1000 Ml  IV  07/12/21 16:32  Infused





  .Q1H1M STA   Infusion


 


Sodium Chloride  Confirm  07/12/21 15:56 





  Sodium Chloride 0.9% 1000 Ml  Administered  07/12/21 15:57 





  Dose  





  1,000 mls @ ud  





  .ROUTE  





  .K-MED ONE  











Lab/Rad Data: 


                           Laboratory Result Diagrams





                                 07/12/21 15:53 





                                 07/12/21 15:53 





                               Laboratory Results











  07/12/21 07/12/21 Range/Units





  15:53 15:53 


 


WBC   7.8  (4.0-10.5)  K/mm3


 


RBC   6.06 H  (4.1-5.6)  M/mm3


 


Hgb   18.5 H  (12.5-18.0)  gm/dl


 


Hct   54.7 H  (42-50)  %


 


MCV   90.3  ()  fl


 


MCH   30.5  (26-32)  pg


 


MCHC   33.8  (32-36)  g/dl


 


RDW   15.5 H  (11.5-14.0)  %


 


Plt Count   132 L  (150-450)  K/mm3


 


MPV   10.9  (7.5-11.0)  fl


 


Gran %   64.8  (36.0-66.0)  %


 


Eos # (Auto)   0.04  (0-0.5)  


 


Absolute Lymphs (auto)   2.02  (1.0-4.6)  


 


Absolute Monos (auto)   0.65  (0.0-1.3)  


 


Lymphocytes %   26.0  (24.0-44.0)  %


 


Monocytes %   8.4  (0.0-12.0)  %


 


Eosinophils %   0.5  (0.00-5.0)  %


 


Basophils %   0.3  (0.0-0.4)  %


 


Absolute Granulocytes   5.05  (1.4-6.9)  


 


Basophils #   0.02  (0-0.4)  


 


Sodium  134 L   (137-145)  mmol/L


 


Potassium  3.8   (3.5-5.1)  mmol/L


 


Chloride  104   ()  mmol/L


 


Carbon Dioxide  20 L   (22-30)  mmol/L


 


Anion Gap  13.5   (5-15)  MEQ/L


 


BUN  8 L   (9-20)  mg/dL


 


Creatinine  0.96   (0.66-1.25)  mg/dL


 


Estimated GFR  > 60.0   ML/MIN


 


Glucose  142 H   ()  mg/dL


 


Calcium  9.5   (8.4-10.2)  mg/dL


 


Total Bilirubin  0.80   (0.2-1.3)  mg/dL


 


AST  54   (17-59)  U/L


 


ALT  34   (0-50)  U/L


 


Alkaline Phosphatase  132 H   ()  U/L


 


Creatine Kinase  48 L   ()  U/L


 


Serum Total Protein  7.0   (6.3-8.2)  g/dL


 


Albumin  3.7   (3.5-5.0)  g/dL














- Progress


Progress: improved


Progress Note: 


Patient will be transferred to [facility].  RN called general surgery.  I 

did not receive a return call.  We called [facility].  Case discussed with 

Dr. Pia sneed accepts consult  Case discussed with vascular surgery regarding 

the clot.   consult.  Dr. Gaytan is a hospitalist accepts transfer.  

Care discussed with patient who accepts transfer to [facility].  Patient 

voices no other complaints concerns at this time.


07/12/21 21:38





Counseled pt/family regarding: lab results, diagnosis, rad results





- Departure


Departure Disposition: Transfer


Clinical Impression: 


 Hepatic steatosis, Nephrolithiasis, Hydroureter, Hydronephrosis, 

Ureterolithiasis, AAA (abdominal aortic aneurysm), AORTIC CLOT, Vascular 

calcification, Arthritis of spine





Condition: Stable


Critical Care Time: No


Referrals: 


ALVARO HERNANDEZ [Primary Care Provider] -

## 2021-07-12 NOTE — XRAY
Exam: CT of the abdomen and pelvis with IV contrast from 7/21/2021.

                                  CTDI:  20.36 mGy



Comparison: CT of the abdomen and pelvis without IV contrast from 3/4/2021.



Indication: 60-year-old male with pelvic pain; hematuria.  Consider renal mass.



Technique: Post-IV contrast axial images were obtained through the abdomen and

pelvis during automated injection of 80 cc of Isovue-370 IV contrast material.

 Reconstructed coronal and sagittal images were created and reviewed.  The

patient was unable to raise his arms above his abdominal level for this exam.



Findings: The lung bases reveals some minimal posterior compression

atelectatic change at the right lung base.  I again note at least several old

healed rib fracture deformities posterior laterally within the right lower rib

cage.



The liver is of normal size.  Moderate diffuse hepatic decreased attenuation

is seen consistent with hepatic steatosis.  This is unchanged.  A discrete

liver mass or intrahepatic biliary duct distention is not seen.  The

gallbladder is distended and reveals no dense calcifications within it.  The

spleen is of normal size and reveals a calcified splenic granuloma.



The pancreas is remarkable for several small calcifications within the distal

pancreatic tail representing no change from 3/4/2021.  Correlate clinically

regarding mild chronic calcific pancreatitis.  No pancreatic mass or

pancreatic duct distention is seen.  No peripancreatic inflammatory changes

are seen.



The adrenal glands appear of normal size and configuration.  The kidneys

appear of unremarkable size.  I again note some nonobstructing renal calculi

within the lower pole of the left kidney, the largest measuring 6.9 mm in

diameter.  Several tiny micro-calculi are seen within the upper pole of the

left kidney on the coronal images.  There appears to be mild left-sided

hydronephrosis and hydroureter down to the level of an obstructing stone

within the distal left ureter on axial images #80 through #82.  This distal

left ureteral stone measures a maximum of 7.4 mm in diameter.  The right

ureter appears unremarkable.  There is delay of excretion of contrast from the

left kidney as compared to the right kidney.  This is due to the obstructive

uropathy on the left.  No renal mass is seen.



I again see a distal abdominal aortic aneurysm with significant peripheral

clot within it.  The aneurysm measures 3.9 cm in maximum width on coronal

image #76 which is similar to coronal image #84 from 3/4/2021.  On sagittal

image #136, the aneurysm measures about 3.8 cm in AP dimension which is

similar to sagittal image #130 from 3/4/2021.  I see no evidence of periaortic

active leak.  The inferior mesenteric artery drapes along the anterior surface

of the aneurysm on the sagittal images.  Moderate atherosclerotic vascular

calcification is seen within the proximal renal arteries, distal abdominal

aorta, and iliac arteries.



No abnormal retroperitoneal lymphadenopathy is seen.  No free air or ventral

abdominal wall hernia is seen.



The appendix appears unremarkable within the right lower quadrant.  There is

no evidence of abnormal bowel distention or obstruction.  Moderate

intraperitoneal fat is seen.



The urinary bladder is only minimally distended which probably accounts for

apparent urinary bladder wall thickening.  No stone is seen within the urinary

bladder.  The seminal vesicles and prostate gland appear unremarkable.  No

abnormal pelvic mass or lymphadenopathy is seen.  No free intraperitoneal

fluid is seen.  Vascular calcification is seen within both common femoral

arteries.  I again note small bilateral fat-containing inguinal hernias, a bit

larger on the right than left.  Some postinflammatory lymph nodes are seen

within each groin.



The skeleton reveals no acute fracture or aggressive bone lesion.  There is

mild to moderate degenerative disc disease at L4-L5 and marked degenerative

disc disease at L5-S1.  These findings are unchanged.



Impression:



1.  There is acute obstructive uropathy on the left.  A distal left ureteral

7.4 mm in diameter obstructing stone is seen causing left-sided

hydronephrosis, hydroureter, and delay in excretion from the left kidney of

the IV contrast.



2.  I again note other calculi within the left kidney representing no

significant interval change.



3.  Stable 3.9 cm in diameter distal abdominal aortic aneurysm, moderate

hepatic steatosis, lower right-sided rib fracture deformities, small

calcifications within the distal pancreatic tail (stable), moderate

atherosclerotic vascular calcification, and small bilateral fat-containing

inguinal hernias, right slightly larger than left.

## 2021-07-28 ENCOUNTER — HOSPITAL ENCOUNTER (OUTPATIENT)
Dept: HOSPITAL 33 - ED | Age: 61
Setting detail: OBSERVATION
Discharge: HOME | End: 2021-07-28
Attending: FAMILY MEDICINE | Admitting: FAMILY MEDICINE
Payer: MEDICARE

## 2021-07-28 VITALS — SYSTOLIC BLOOD PRESSURE: 114 MMHG | DIASTOLIC BLOOD PRESSURE: 76 MMHG | HEART RATE: 74 BPM

## 2021-07-28 VITALS — OXYGEN SATURATION: 95 %

## 2021-07-28 DIAGNOSIS — R11.2: ICD-10-CM

## 2021-07-28 DIAGNOSIS — K52.9: Primary | ICD-10-CM

## 2021-07-28 DIAGNOSIS — E87.2: ICD-10-CM

## 2021-07-28 DIAGNOSIS — Z79.01: ICD-10-CM

## 2021-07-28 DIAGNOSIS — E78.00: ICD-10-CM

## 2021-07-28 DIAGNOSIS — Z20.828: ICD-10-CM

## 2021-07-28 DIAGNOSIS — F10.20: ICD-10-CM

## 2021-07-28 DIAGNOSIS — Z79.899: ICD-10-CM

## 2021-07-28 DIAGNOSIS — K76.0: ICD-10-CM

## 2021-07-28 DIAGNOSIS — I71.4: ICD-10-CM

## 2021-07-28 DIAGNOSIS — I10: ICD-10-CM

## 2021-07-28 DIAGNOSIS — I69.351: ICD-10-CM

## 2021-07-28 LAB
ALBUMIN SERPL-MCNC: 4.5 G/DL (ref 3.5–5)
ALP SERPL-CCNC: 124 U/L (ref 38–126)
ALT SERPL-CCNC: 46 U/L (ref 0–50)
ANION GAP SERPL CALC-SCNC: 21 MEQ/L (ref 5–15)
AST SERPL QL: 67 U/L (ref 17–59)
BASOPHILS # BLD AUTO: 0.05 10*3/UL (ref 0–0.4)
BASOPHILS NFR BLD AUTO: 0.5 % (ref 0–0.4)
BILIRUB BLD-MCNC: 0.9 MG/DL (ref 0.2–1.3)
BUN SERPL-MCNC: 4 MG/DL (ref 9–20)
CALCIUM SPEC-MCNC: 9.8 MG/DL (ref 8.4–10.2)
CHLORIDE SERPL-SCNC: 95 MMOL/L (ref 98–107)
CO2 SERPL-SCNC: 21 MMOL/L (ref 22–30)
CREAT SERPL-MCNC: 0.84 MG/DL (ref 0.66–1.25)
EOSINOPHIL # BLD AUTO: 0.08 10*3/UL (ref 0–0.5)
GFR SERPLBLD BASED ON 1.73 SQ M-ARVRAT: > 60 ML/MIN
GLUCOSE SERPL-MCNC: 103 MG/DL (ref 74–106)
HCT VFR BLD AUTO: 56.8 % (ref 42–50)
HGB BLD-MCNC: 19.6 GM/DL (ref 12.5–18)
LIPASE SERPL-CCNC: 193 U/L (ref 23–300)
LYMPHOCYTES # SPEC AUTO: 3.23 10*3/UL (ref 1–4.6)
MCH RBC QN AUTO: 30.4 PG (ref 26–32)
MCHC RBC AUTO-ENTMCNC: 34.5 G/DL (ref 32–36)
MONOCYTES # BLD AUTO: 0.77 10*3/UL (ref 0–1.3)
PLATELET # BLD AUTO: 222 K/MM3 (ref 150–450)
POTASSIUM SERPLBLD-SCNC: 4.3 MMOL/L (ref 3.5–5.1)
PROT SERPL-MCNC: 8.1 G/DL (ref 6.3–8.2)
RBC # BLD AUTO: 6.45 M/MM3 (ref 4.1–5.6)
SODIUM SERPL-SCNC: 133 MMOL/L (ref 137–145)
WBC # BLD AUTO: 9.7 K/MM3 (ref 4–10.5)

## 2021-07-28 PROCEDURE — 93268 ECG RECORD/REVIEW: CPT

## 2021-07-28 PROCEDURE — G0378 HOSPITAL OBSERVATION PER HR: HCPCS

## 2021-07-28 PROCEDURE — 80053 COMPREHEN METABOLIC PANEL: CPT

## 2021-07-28 PROCEDURE — 96374 THER/PROPH/DIAG INJ IV PUSH: CPT

## 2021-07-28 PROCEDURE — 85025 COMPLETE CBC W/AUTO DIFF WBC: CPT

## 2021-07-28 PROCEDURE — 96360 HYDRATION IV INFUSION INIT: CPT

## 2021-07-28 PROCEDURE — 80307 DRUG TEST PRSMV CHEM ANLYZR: CPT

## 2021-07-28 PROCEDURE — 83690 ASSAY OF LIPASE: CPT

## 2021-07-28 PROCEDURE — 99284 EMERGENCY DEPT VISIT MOD MDM: CPT

## 2021-07-28 PROCEDURE — G0480 DRUG TEST DEF 1-7 CLASSES: HCPCS

## 2021-07-28 PROCEDURE — 74177 CT ABD & PELVIS W/CONTRAST: CPT

## 2021-07-28 PROCEDURE — 84484 ASSAY OF TROPONIN QUANT: CPT

## 2021-07-28 PROCEDURE — 36415 COLL VENOUS BLD VENIPUNCTURE: CPT

## 2021-07-28 PROCEDURE — U0003 INFECTIOUS AGENT DETECTION BY NUCLEIC ACID (DNA OR RNA); SEVERE ACUTE RESPIRATORY SYNDROME CORONAVIRUS 2 (SARS-COV-2) (CORONAVIRUS DISEASE [COVID-19]), AMPLIFIED PROBE TECHNIQUE, MAKING USE OF HIGH THROUGHPUT TECHNOLOGIES AS DESCRIBED BY CMS-2020-01-R: HCPCS

## 2021-07-28 NOTE — XRAY
Indication: Abdomen pain.



Multiple contiguous axial images obtained through the abdomen and pelvis using

80 cc Isovue 370 contrast.



Comparison: July 12, 2021



Lung bases again demonstrates minimal dependent atelectasis without infiltrate

or effusion.  Heart not enlarged.



Images of the abdomen again degraded by beam artifact from patient's arm/hand

more than before.  Noncontrasted stomach and bowel loops remain nonobstructed.

 No free fluid/air.  New left double-J ureteral stent catheter in situ.

Stable left renal micro-calculus, fatty liver, and scattered arteriosclerotic

disease with 3.9 cm distal AAA.



Remaining liver, gallbladder, pancreas, spleen, adrenal glands, kidneys,

ureters, and bladder are unremarkable.  No pathologic retroperitoneal

lymphadenopathy.



Impression:

1.  Worsening beam artifact from patient's arm/hand.

2.  New left double-J ureteral stent catheter without complications.

3.  Stable left renal micro-calculus, fatty liver, and distal AAA.



Comment: Preliminary interpretation made by C.  No critical discrepancy.

## 2021-07-28 NOTE — PCM.SSS
History of Present Illness





- Chief Complaint


Chief Complaint: abdominal pain


History of Present Illness: 


 is a 60 year old male patient of Dr Hernandez, history is difficult as 

patient was intoxicated on admission and has a previous stroke with residual 

speech difficulty and right hemiparesis. he has had nausea and vomiting as well 

as diarrhea and pain in the hip, he was found to have colitis in ER, was 

attempted to transfer to [facility] but admitted here as bed not available.








- Review of Systems


Constitutional: No Fever, No Chills


Respiratory: No Cough, No Short Of Breath


Cardiac: No Chest Pain, No Edema, No Syncope


Abdominal/Gastrointestinal: Abdominal Pain, Nausea, Vomiting, Diarrhea


Genitourinary Symptoms: No Dysuria


Skin: No Rash


All Other Systems: Reviewed and Negative





Medications & Allergies


Home Medications: 


                              Home Medication List





Atorvastatin Calcium [Lipitor] 80 mg PO DAILY 01/11/18 [History Confirmed 

07/28/21]


Lisinopril 5 mg*** [Zestril 5 MG***] 5 mg PO DAILY 01/11/18 [History Confirmed 

07/28/21]


Tamsulosin HCl 0.4 mg*** [Flomax 0.4 MG***] 0.4 mg PO DAILY 01/11/18 [History 

Confirmed 07/28/21]


Baclofen 10 mg*** [Lioresal 10 mg***] 10 mg PO TIDPRN PRN 08/10/19 [History 

Confirmed 07/28/21]


PANTOPRAZOLE 40 mg Tablet*** [Protonix 40MG Tablet***] 40 mg PO QAM #30 tab 

08/13/19 [Rx Confirmed 07/28/21]


Apixaban [Eliquis] 5 mg PO BID #60 tablet 06/07/21 [Rx Confirmed 07/28/21]


Aspirin EC 81 mg*** [Ecotrin 81 mg***] 81 mg PO DAILY #30 tablet 06/07/21 [Rx 

Confirmed 07/28/21]


Metoprolol Succinate 50 mg*** [Toprol Xl 50 MG***] 50 mg PO DAILY #30 tablet.sa 

06/07/21 [Rx Confirmed 07/28/21]


Cyanocobalamin (Vitamin B-12) [Vitamin B-12] 1,000 mcg PO DAILY 07/28/21 

[History Confirmed 07/28/21]


Duloxetine HCl 30 mg*** [Cymbalta 30 MG Capsule***] 30 mg PO DAILY 07/28/21 

[History Confirmed 07/28/21]








Allergies/Adverse Reactions: 


                                    Allergies











Allergy/AdvReac Type Severity Reaction Status Date / Time


 


No Known Drug Allergies Allergy   Verified 07/28/21 00:42














- Past Medical History


Past Medical History: Yes


Neurological History: Stroke


ENT History: No Pertinent History


Cardiac History: High Cholesterol, Hypertension


Respiratory History: No Pertinent History


Endocrine Medical History: No Pertinent History


Musculoskelatal History: No Pertinent History


GI Medical History: Gallbladder Disease


 History: No Pertinent History


Pyscho-Social History: No Pertinent History


Male Reproductive Disorders: Prostate Problems


Comment: REPORTS RHUEMATIC FEVER AND HEPITITIS WHEN HE WAS YOUNG BUT OTHERISE 

NONSIGNIFICANT. Kidney stones.





- Past Surgical History


Past Surgical History: Yes


Neuro Surgical History: No Pertinent History


Cardiac History: No Pertinent History


Respiratory Surgery: No Pertinent History


GI Surgical History: No Pertinent History


Genitourinary Surgical Hx: No Pertinent History


Musculskeletal Surgical Hx: Orthopedic Surgery


Male Surgical History: No Pertinent History


Other Surgical History: Colonoscopy





- Social History


Smoking Status: Current every day smoker


How long have you smoked: 40 years


Exposure to second hand smoke: Yes


Alcohol: Heavy, Daily


Drug Use: none





- Physical Exam


Vital Signs: 


                               Vital Signs - 24 hr











  Temp Pulse Resp BP Pulse Ox


 


 07/28/21 16:00  98.1 F  87  16  114/73  95


 


 07/28/21 11:26   60  16  147/94  98


 


 07/28/21 11:25  98.3 F  85  16  121/81  98


 


 07/28/21 10:26   60  16  147/94  98


 


 07/28/21 09:00   60  16  150/89  98


 


 07/28/21 07:26      98


 


 07/28/21 06:00   90  18  144/104  97


 


 07/28/21 04:49    15  139/87 


 


 07/28/21 03:47   90  19  136/82  98


 


 07/28/21 01:42   92 H  17  140/94  98


 


 07/28/21 00:31   122 H  24  167/111  96











General Appearance: no apparent distress


Neurologic Exam: alert, motor weakness, slurred speech


Respiratory Exam: normal breath sounds, lungs clear, No respiratory distress


Cardiovascular Exam: regular rate/rhythm, normal heart sounds, normal peripheral

pulses


Gastrointestinal/Abdomen Exam: soft, normal bowel sounds, tenderness (LLQ), No 

mass


Extremity Exam: normal inspection, normal range of motion, pelvis stable





Results





- Labs


Lab/Micro Results: 


                            Lab Results-Last 24 Hours











  07/28/21 07/28/21 07/28/21 Range/Units





  01:33 01:33 01:33 


 


WBC  9.7    (4.0-10.5)  K/mm3


 


RBC  6.45 H*    (4.1-5.6)  M/mm3


 


Hgb  19.6 H    (12.5-18.0)  gm/dl


 


Hct  56.8 H    (42-50)  %


 


MCV  88.1    ()  fl


 


MCH  30.4    (26-32)  pg


 


MCHC  34.5    (32-36)  g/dl


 


RDW  15.2 H    (11.5-14.0)  %


 


Plt Count  222    (150-450)  K/mm3


 


MPV  11.0    (7.5-11.0)  fl


 


Gran %  57.5    (36.0-66.0)  %


 


Eos # (Auto)  0.08    (0-0.5)  


 


Absolute Lymphs (auto)  3.23    (1.0-4.6)  


 


Absolute Monos (auto)  0.77    (0.0-1.3)  


 


Lymphocytes %  33.3    (24.0-44.0)  %


 


Monocytes %  7.9    (0.0-12.0)  %


 


Eosinophils %  0.8    (0.00-5.0)  %


 


Basophils %  0.5    (0.0-0.4)  %


 


Absolute Granulocytes  5.58    (1.4-6.9)  


 


Basophils #  0.05    (0-0.4)  


 


Sodium   133 L   (137-145)  mmol/L


 


Potassium   4.3   (3.5-5.1)  mmol/L


 


Chloride   95 L   ()  mmol/L


 


Carbon Dioxide   21 L   (22-30)  mmol/L


 


Anion Gap   21.0 H   (5-15)  MEQ/L


 


BUN   4 L   (9-20)  mg/dL


 


Creatinine   0.84   (0.66-1.25)  mg/dL


 


Estimated GFR   > 60.0   ML/MIN


 


Glucose   103   ()  mg/dL


 


Calcium   9.8   (8.4-10.2)  mg/dL


 


Total Bilirubin   0.90   (0.2-1.3)  mg/dL


 


AST   67 H   (17-59)  U/L


 


ALT   46   (0-50)  U/L


 


Alkaline Phosphatase   124   ()  U/L


 


Troponin I     (0.000-0.034)  ng/mL


 


Serum Total Protein   8.1   (6.3-8.2)  g/dL


 


Albumin   4.5   (3.5-5.0)  g/dL


 


Lipase   193   ()  U/L


 


Ethyl Alcohol    169 H  (0-10)  mg/dL


 


SARS-CoV-2 (PCR)     (NEGATIVE)  














  07/28/21 07/28/21 07/28/21 Range/Units





  01:34 04:48 05:46 


 


WBC     (4.0-10.5)  K/mm3


 


RBC     (4.1-5.6)  M/mm3


 


Hgb     (12.5-18.0)  gm/dl


 


Hct     (42-50)  %


 


MCV     ()  fl


 


MCH     (26-32)  pg


 


MCHC     (32-36)  g/dl


 


RDW     (11.5-14.0)  %


 


Plt Count     (150-450)  K/mm3


 


MPV     (7.5-11.0)  fl


 


Gran %     (36.0-66.0)  %


 


Eos # (Auto)     (0-0.5)  


 


Absolute Lymphs (auto)     (1.0-4.6)  


 


Absolute Monos (auto)     (0.0-1.3)  


 


Lymphocytes %     (24.0-44.0)  %


 


Monocytes %     (0.0-12.0)  %


 


Eosinophils %     (0.00-5.0)  %


 


Basophils %     (0.0-0.4)  %


 


Absolute Granulocytes     (1.4-6.9)  


 


Basophils #     (0-0.4)  


 


Sodium     (137-145)  mmol/L


 


Potassium     (3.5-5.1)  mmol/L


 


Chloride     ()  mmol/L


 


Carbon Dioxide     (22-30)  mmol/L


 


Anion Gap     (5-15)  MEQ/L


 


BUN     (9-20)  mg/dL


 


Creatinine     (0.66-1.25)  mg/dL


 


Estimated GFR     ML/MIN


 


Glucose     ()  mg/dL


 


Calcium     (8.4-10.2)  mg/dL


 


Total Bilirubin     (0.2-1.3)  mg/dL


 


AST     (17-59)  U/L


 


ALT     (0-50)  U/L


 


Alkaline Phosphatase     ()  U/L


 


Troponin I  < 0.012  < 0.012   (0.000-0.034)  ng/mL


 


Serum Total Protein     (6.3-8.2)  g/dL


 


Albumin     (3.5-5.0)  g/dL


 


Lipase     ()  U/L


 


Ethyl Alcohol     (0-10)  mg/dL


 


SARS-CoV-2 (PCR)    NEGATIVE  (NEGATIVE)  














  07/28/21 Range/Units





  08:00 


 


WBC   (4.0-10.5)  K/mm3


 


RBC   (4.1-5.6)  M/mm3


 


Hgb   (12.5-18.0)  gm/dl


 


Hct   (42-50)  %


 


MCV   ()  fl


 


MCH   (26-32)  pg


 


MCHC   (32-36)  g/dl


 


RDW   (11.5-14.0)  %


 


Plt Count   (150-450)  K/mm3


 


MPV   (7.5-11.0)  fl


 


Gran %   (36.0-66.0)  %


 


Eos # (Auto)   (0-0.5)  


 


Absolute Lymphs (auto)   (1.0-4.6)  


 


Absolute Monos (auto)   (0.0-1.3)  


 


Lymphocytes %   (24.0-44.0)  %


 


Monocytes %   (0.0-12.0)  %


 


Eosinophils %   (0.00-5.0)  %


 


Basophils %   (0.0-0.4)  %


 


Absolute Granulocytes   (1.4-6.9)  


 


Basophils #   (0-0.4)  


 


Sodium   (137-145)  mmol/L


 


Potassium   (3.5-5.1)  mmol/L


 


Chloride   ()  mmol/L


 


Carbon Dioxide   (22-30)  mmol/L


 


Anion Gap   (5-15)  MEQ/L


 


BUN   (9-20)  mg/dL


 


Creatinine   (0.66-1.25)  mg/dL


 


Estimated GFR   ML/MIN


 


Glucose   ()  mg/dL


 


Calcium   (8.4-10.2)  mg/dL


 


Total Bilirubin   (0.2-1.3)  mg/dL


 


AST   (17-59)  U/L


 


ALT   (0-50)  U/L


 


Alkaline Phosphatase   ()  U/L


 


Troponin I  < 0.012  (0.000-0.034)  ng/mL


 


Serum Total Protein   (6.3-8.2)  g/dL


 


Albumin   (3.5-5.0)  g/dL


 


Lipase   ()  U/L


 


Ethyl Alcohol   (0-10)  mg/dL


 


SARS-CoV-2 (PCR)   (NEGATIVE)  














- Radiology Impressions


Radiology Exams & Impressions: 


                              Radiology Procedures











 Category Date Time Status


 


 ABDOMEN AND PELVIS W CONTRAST [CT] Stat Exams  07/28/21 01:28 Completed














Assessment/Plan


(1) Colitis


Current Visit: Yes   Status: Acute   


Assessment & Plan: 


transfer to [facility] for GI consult and further care


Code(s): K52.9 - NONINFECTIVE GASTROENTERITIS AND COLITIS, UNSPECIFIED   





(2) AAA (abdominal aortic aneurysm)


Current Visit: Yes   Status: Acute   Code(s): I71.4 - ABDOMINAL AORTIC ANEURYSM,

 WITHOUT RUPTURE   





(3) Alcoholism


Current Visit: Yes   Status: Acute   Code(s): F10.20 - ALCOHOL DEPENDENCE, 

UNCOMPLICATED   





(4) Hepatic steatosis


Current Visit: Yes   Status: Acute   Code(s): K76.0 - FATTY (CHANGE OF) LIVER, 

NOT ELSEWHERE CLASSIFIED   





(5) CVA (cerebral infarction)


Current Visit: No   Status: Acute   


Qualifiers: 


   Cerebral infarction mechanism: unspecified mechanism   Qualified Code(s): 

I63.9 - Cerebral infarction, unspecified   


Code(s): I63.9 - CEREBRAL INFARCTION, UNSPECIFIED   





Hospital Summary





- Vitals & Intake/Output


Vital Signs: 


                                   Vital Signs











Temperature  98.1 F   07/28/21 16:00


 


Pulse Rate  87   07/28/21 16:00


 


Respiratory Rate  16   07/28/21 16:00


 


Blood Pressure  114/73   07/28/21 16:00


 


O2 Sat by Pulse Oximetry  95   07/28/21 16:00











Intake & Output: 


                                 Intake & Output











 07/26/21 07/27/21 07/28/21 07/29/21





 11:59 11:59 11:59 11:59


 


Weight   108.1 kg 














- Lab


Result Diagrams: 


                                 07/28/21 01:33





                                 07/28/21 01:33


Lab Results-Last 24 Hrs: 


                            Lab Results-Last 24 Hours











  07/28/21 07/28/21 07/28/21 Range/Units





  01:33 01:33 01:33 


 


WBC  9.7    (4.0-10.5)  K/mm3


 


RBC  6.45 H*    (4.1-5.6)  M/mm3


 


Hgb  19.6 H    (12.5-18.0)  gm/dl


 


Hct  56.8 H    (42-50)  %


 


MCV  88.1    ()  fl


 


MCH  30.4    (26-32)  pg


 


MCHC  34.5    (32-36)  g/dl


 


RDW  15.2 H    (11.5-14.0)  %


 


Plt Count  222    (150-450)  K/mm3


 


MPV  11.0    (7.5-11.0)  fl


 


Gran %  57.5    (36.0-66.0)  %


 


Eos # (Auto)  0.08    (0-0.5)  


 


Absolute Lymphs (auto)  3.23    (1.0-4.6)  


 


Absolute Monos (auto)  0.77    (0.0-1.3)  


 


Lymphocytes %  33.3    (24.0-44.0)  %


 


Monocytes %  7.9    (0.0-12.0)  %


 


Eosinophils %  0.8    (0.00-5.0)  %


 


Basophils %  0.5    (0.0-0.4)  %


 


Absolute Granulocytes  5.58    (1.4-6.9)  


 


Basophils #  0.05    (0-0.4)  


 


Sodium   133 L   (137-145)  mmol/L


 


Potassium   4.3   (3.5-5.1)  mmol/L


 


Chloride   95 L   ()  mmol/L


 


Carbon Dioxide   21 L   (22-30)  mmol/L


 


Anion Gap   21.0 H   (5-15)  MEQ/L


 


BUN   4 L   (9-20)  mg/dL


 


Creatinine   0.84   (0.66-1.25)  mg/dL


 


Estimated GFR   > 60.0   ML/MIN


 


Glucose   103   ()  mg/dL


 


Calcium   9.8   (8.4-10.2)  mg/dL


 


Total Bilirubin   0.90   (0.2-1.3)  mg/dL


 


AST   67 H   (17-59)  U/L


 


ALT   46   (0-50)  U/L


 


Alkaline Phosphatase   124   ()  U/L


 


Troponin I     (0.000-0.034)  ng/mL


 


Serum Total Protein   8.1   (6.3-8.2)  g/dL


 


Albumin   4.5   (3.5-5.0)  g/dL


 


Lipase   193   ()  U/L


 


Ethyl Alcohol    169 H  (0-10)  mg/dL


 


SARS-CoV-2 (PCR)     (NEGATIVE)  














  07/28/21 07/28/21 07/28/21 Range/Units





  01:34 04:48 05:46 


 


WBC     (4.0-10.5)  K/mm3


 


RBC     (4.1-5.6)  M/mm3


 


Hgb     (12.5-18.0)  gm/dl


 


Hct     (42-50)  %


 


MCV     ()  fl


 


MCH     (26-32)  pg


 


MCHC     (32-36)  g/dl


 


RDW     (11.5-14.0)  %


 


Plt Count     (150-450)  K/mm3


 


MPV     (7.5-11.0)  fl


 


Gran %     (36.0-66.0)  %


 


Eos # (Auto)     (0-0.5)  


 


Absolute Lymphs (auto)     (1.0-4.6)  


 


Absolute Monos (auto)     (0.0-1.3)  


 


Lymphocytes %     (24.0-44.0)  %


 


Monocytes %     (0.0-12.0)  %


 


Eosinophils %     (0.00-5.0)  %


 


Basophils %     (0.0-0.4)  %


 


Absolute Granulocytes     (1.4-6.9)  


 


Basophils #     (0-0.4)  


 


Sodium     (137-145)  mmol/L


 


Potassium     (3.5-5.1)  mmol/L


 


Chloride     ()  mmol/L


 


Carbon Dioxide     (22-30)  mmol/L


 


Anion Gap     (5-15)  MEQ/L


 


BUN     (9-20)  mg/dL


 


Creatinine     (0.66-1.25)  mg/dL


 


Estimated GFR     ML/MIN


 


Glucose     ()  mg/dL


 


Calcium     (8.4-10.2)  mg/dL


 


Total Bilirubin     (0.2-1.3)  mg/dL


 


AST     (17-59)  U/L


 


ALT     (0-50)  U/L


 


Alkaline Phosphatase     ()  U/L


 


Troponin I  < 0.012  < 0.012   (0.000-0.034)  ng/mL


 


Serum Total Protein     (6.3-8.2)  g/dL


 


Albumin     (3.5-5.0)  g/dL


 


Lipase     ()  U/L


 


Ethyl Alcohol     (0-10)  mg/dL


 


SARS-CoV-2 (PCR)    NEGATIVE  (NEGATIVE)  














  07/28/21 Range/Units





  08:00 


 


WBC   (4.0-10.5)  K/mm3


 


RBC   (4.1-5.6)  M/mm3


 


Hgb   (12.5-18.0)  gm/dl


 


Hct   (42-50)  %


 


MCV   ()  fl


 


MCH   (26-32)  pg


 


MCHC   (32-36)  g/dl


 


RDW   (11.5-14.0)  %


 


Plt Count   (150-450)  K/mm3


 


MPV   (7.5-11.0)  fl


 


Gran %   (36.0-66.0)  %


 


Eos # (Auto)   (0-0.5)  


 


Absolute Lymphs (auto)   (1.0-4.6)  


 


Absolute Monos (auto)   (0.0-1.3)  


 


Lymphocytes %   (24.0-44.0)  %


 


Monocytes %   (0.0-12.0)  %


 


Eosinophils %   (0.00-5.0)  %


 


Basophils %   (0.0-0.4)  %


 


Absolute Granulocytes   (1.4-6.9)  


 


Basophils #   (0-0.4)  


 


Sodium   (137-145)  mmol/L


 


Potassium   (3.5-5.1)  mmol/L


 


Chloride   ()  mmol/L


 


Carbon Dioxide   (22-30)  mmol/L


 


Anion Gap   (5-15)  MEQ/L


 


BUN   (9-20)  mg/dL


 


Creatinine   (0.66-1.25)  mg/dL


 


Estimated GFR   ML/MIN


 


Glucose   ()  mg/dL


 


Calcium   (8.4-10.2)  mg/dL


 


Total Bilirubin   (0.2-1.3)  mg/dL


 


AST   (17-59)  U/L


 


ALT   (0-50)  U/L


 


Alkaline Phosphatase   ()  U/L


 


Troponin I  < 0.012  (0.000-0.034)  ng/mL


 


Serum Total Protein   (6.3-8.2)  g/dL


 


Albumin   (3.5-5.0)  g/dL


 


Lipase   ()  U/L


 


Ethyl Alcohol   (0-10)  mg/dL


 


SARS-CoV-2 (PCR)   (NEGATIVE)  














- Radiology Exams


Ordered Rad Exams-Entire Visit: 


                              Radiology Procedures











 Category Date Time Status


 


 ABDOMEN AND PELVIS W CONTRAST [CT] Stat Exams  07/28/21 01:28 Completed














- Discharge


Disposition: DC TO [facility]


Condition: Stable


Prescriptions: 


No Action


   Tamsulosin HCl 0.4 mg*** [Flomax 0.4 MG***] 0.4 mg PO DAILY


   Lisinopril 5 mg*** [Zestril 5 MG***] 5 mg PO DAILY


   Atorvastatin Calcium [Lipitor] 80 mg PO DAILY


   Baclofen 10 mg*** [Lioresal 10 mg***] 10 mg PO TIDPRN PRN


     PRN Reason: Muscle Spasms


   PANTOPRAZOLE 40 mg Tablet*** [Protonix 40MG Tablet***] 40 mg PO QAM #30 tab


   Aspirin EC 81 mg*** [Ecotrin 81 mg***] 81 mg PO DAILY #30 tablet


   Apixaban [Eliquis] 5 mg PO BID #60 tablet


   Metoprolol Succinate 50 mg*** [Toprol Xl 50 MG***] 50 mg PO DAILY #30 

tablet.sa


   Cyanocobalamin (Vitamin B-12) [Vitamin B-12] 1,000 mcg PO DAILY


   Duloxetine HCl 30 mg*** [Cymbalta 30 MG Capsule***] 30 mg PO DAILY


Follow up with: 


ALVARO HERNANDEZ [Primary Care Provider] - 


Forms:  Ambulance Transport Record, Transfer Record Inter-Agency

## 2021-07-28 NOTE — ERPHSYRPT
- History of Present Illness


Time Seen by Provider: 07/28/21 00:34


Historian: patient


Exam Limitations: no limitations


Patient Subjective Stated Complaint: Vomiting upon arrival to facility. Patient 

c/o N/V X 2 weeks and states that he has been drinking today due to left groin 

pain.


Triage Nursing Assessment: Patient is alert to name and place but not to time; 

states he had his gallbladder removed 3 weeks ago but there is no evidence of 

recent abdominal surgery upon inspection. Patient vomiting; liquid emesis. 

Patient states he has been drinking today. Right sided weakness noted but is not

new for this patient.


Physician History: 


Patient is a 60-year-old male presents to our ED via EMS for evaluation of 

nausea and vomiting x2 weeks.  Patient states he has been experiencing pain to 

his groin area and has been self treating with alcohol.  Patient is currently 

intoxicated.  Patient states he has had surgery in the past.  Patient states he 

had a cholecystectomy 3 weeks ago however there is no physical evidence of this.

 Patient is mildly nauseous at this time.  No trauma no fever.  Patient has mild

residual right-sided weakness from previous stroke.  Symptoms are mild to 

moderate in intensity.  No specific worsening or improving factors.  Patient 

voices no other complaints or concerns at this time.  Patient is intoxicated and

is difficult to obtain a detailed HPI at this time.





Timing/Duration: week(s)


Activities at Onset: none (Weeks)


Quality: aching


Abdominal Pain Onset Location: other (Groin pain.)


Pain Radiation: no radiation


Severity of Pain-Max: moderate


Severity of Pain-Current: mild


Modifying Factors: Improves With: nothing


Associated Symptoms: vomiting, No chest pain, No fever/chills, No headache, No 

neck pain, No shortness of breath, No testicular pain


Previous symptoms: no prior history


Allergies/Adverse Reactions: 








No Known Drug Allergies Allergy (Verified 07/28/21 00:42)


   





Home Medications: 








Atorvastatin Calcium [Lipitor] 80 mg PO DAILY 01/11/18 [History]


Lisinopril 5 mg*** [Zestril 5 MG***] 5 mg PO DAILY 01/11/18 [History]


Tamsulosin HCl 0.4 mg*** [Flomax 0.4 MG***] 0.4 mg PO DAILY 01/11/18 [History]


Baclofen 10 mg*** [Lioresal 10 mg***] 10 mg PO TID 08/10/19 [History]





Hx Tetanus, Diphtheria Vaccination/Date Given: Yes (2018)


Hx Influenza Vaccination/Date Given: No


Hx Pneumococcal Vaccination/Date Given: No





Travel Risk





- International Travel


Have you traveled outside of the country in past 3 weeks: No





- Coronavirus Screening


Are you exhibiting any of the following symptoms?: Yes


Symptoms: Vomiting/Diarrhea


Close contact with a COVID-19 positive Pt in past 14-21 Days: No





- Vaccine Status


Have you recieved a Covid-19 vaccination: No





- Review of Systems


All Other Systems: Unable due to condition





- Past Medical History


Pertinent Past Medical History: Yes


Neurological History: Stroke


ENT History: No Pertinent History


Cardiac History: High Cholesterol, Hypertension


Respiratory History: No Pertinent History


Endocrine Medical History: No Pertinent History


Musculoskeletal History: No Pertinent History


GI Medical History: Gallbladder Disease


 History: No Pertinent History


Psycho-Social History: No Pertinent History


Male Reproductive Disorders: Prostate Problems


Other Medical History: REPORTS RHUEMATIC FEVER AND HEPITITIS WHEN HE WAS YOUNG 

BUT OTHERISE NONSIGNIFICANT. Kidney stones.





- Past Surgical History


Past Surgical History: Yes


Neuro Surgical History: No Pertinent History


Cardiac: No Pertinent History


Respiratory: No Pertinent History


Gastrointestinal: No Pertinent History


Genitourinary: No Pertinent History


Musculoskeletal: Orthopedic Surgery


Male Surgical History: No Pertinent History


Other Surgical History: Colonoscopy





- Social History


Smoking Status: Current every day smoker


How long have you smoked: many years


Exposure to second hand smoke: Yes


Drug Use: none


Patient Lives Alone: Yes





- Nursing Vital Signs


Nursing Vital Signs: 


                               Initial Vital Signs











Pulse Rate  122 H  07/28/21 00:31


 


Respiratory Rate  24   07/28/21 00:31


 


Blood Pressure  167/111   07/28/21 00:31


 


O2 Sat by Pulse Oximetry  96   07/28/21 00:31








                                   Pain Scale











Pain Intensity                 5

















- Physical Exam


General Appearance: no apparent distress, alert


Eye Exam: PERRL/EOMI, eyes nml inspection


Ears, Nose, Throat Exam: normal ENT inspection, pharynx normal, moist mucous 

membranes


Neck Exam: normal inspection, non-tender, supple, full range of motion


Respiratory Exam: normal breath sounds, lungs clear, No respiratory distress


Cardiovascular Exam: regular rate/rhythm, normal heart sounds


Gastrointestinal/Abdomen Exam: soft, tenderness, other (Tenderness to palpation 

at right groin area.  No testicular pain.  Patient's underwear and shorts are 

soaked with urine.  Patient states he urinates on himself because he cannot get 

out of bed to walk to the bathroom.), No mass


Back Exam: normal inspection, normal range of motion, No CVA tenderness, No ve

rtebral tenderness


Extremity Exam: normal inspection, normal range of motion, pelvis stable


Neurologic Exam: alert, oriented x 3, cooperative, normal mood/affect, nml 

cerebellar function, sensation nml, No motor deficits


Skin Exam: normal color, warm, dry


Lymphatic Exam: No adenopathy


**SpO2 Interpretation**: normal


SpO2: 98


O2 Delivery: Room Air





- Course


Nursing assessment & vital signs reviewed: Yes





- CT Exams


  ** Abdomen/Pelvis


CT Interpretation: Tele-radiologist Report (Majority of the colon is collapsed 

which is a nonspecific appearance that can correlate with colitis in the 

appropriate clinical setting.  Unruptured fusiform 4 cm infrarenal abdominal 

aortic aneurysm with moderate infrarenal plaque and thrombus.  9 obstructing 

left renal calculus.  Hepatic steatosi)


Ordered Tests: 


                               Active Orders 24 hr











 Category Date Time Status


 


 ABDOMEN AND PELVIS W CONTRAST [CT] Stat Exams  07/28/21 01:28 Taken


 


 CBC W DIFF Stat Lab  07/28/21 01:33 Completed


 


 CMP Stat Lab  07/28/21 01:33 Completed


 


 ETHYL ALCOHOL Stat Lab  07/28/21 01:33 Completed


 


 LIPASE Stat Lab  07/28/21 01:33 Completed


 


 TROPONIN Q3H Lab  07/28/21 01:34 Completed


 


 TROPONIN Q3H Lab  07/28/21 04:48 Completed


 


 TROPONIN Q3H Lab  07/28/21 07:30 Ordered


 


 TROPONIN Q3H Lab  07/28/21 10:30 Ordered


 


 TROPONIN Q3H Lab  07/28/21 13:30 Ordered


 


 UA W/RFX UR CULTURE Stat Lab  07/28/21 01:26 Ordered


 


 Urine Triage Profile Stat Lab  07/28/21 01:26 Ordered


 


 Transfer Order Routine Transfer  07/28/21 Ordered








Medication Summary














Discontinued Medications














Generic Name Dose Route Start Last Admin





  Trade Name Freq  PRN Reason Stop Dose Admin


 


Piperacillin Sod/Tazobactam  100 mls @ 200 mls/hr  07/28/21 05:47  07/28/21 

06:00





  Sod 3.375 gm/ Sodium Chloride  IV  07/28/21 06:16  200 mls/hr





  STAT ONE   Administration


 


Sodium Chloride  Confirm  07/28/21 06:00 





  Sodium Chloride 100ml Mini-Bag Plus  Administered  07/28/21 06:01 





  Dose  





  100 mls @ ud  





  IV  





  .STK-MED ONE  


 


Piperacillin Sod/Tazobactam Sod  Confirm  07/28/21 05:59 





  Zosyn 3.375 Gm Vial  Administered  07/28/21 06:00 





  Dose  





  3.375 gm  





  IV  





  .STK-MED ONE  











Lab/Rad Data: 


                           Laboratory Result Diagrams





                                 07/28/21 01:33 





                                 07/28/21 01:33 





                               Laboratory Results











  07/28/21 07/28/21 07/28/21 Range/Units





  05:46 04:48 01:34 


 


WBC     (4.0-10.5)  K/mm3


 


RBC     (4.1-5.6)  M/mm3


 


Hgb     (12.5-18.0)  gm/dl


 


Hct     (42-50)  %


 


MCV     ()  fl


 


MCH     (26-32)  pg


 


MCHC     (32-36)  g/dl


 


RDW     (11.5-14.0)  %


 


Plt Count     (150-450)  K/mm3


 


MPV     (7.5-11.0)  fl


 


Gran %     (36.0-66.0)  %


 


Eos # (Auto)     (0-0.5)  


 


Absolute Lymphs (auto)     (1.0-4.6)  


 


Absolute Monos (auto)     (0.0-1.3)  


 


Lymphocytes %     (24.0-44.0)  %


 


Monocytes %     (0.0-12.0)  %


 


Eosinophils %     (0.00-5.0)  %


 


Basophils %     (0.0-0.4)  %


 


Absolute Granulocytes     (1.4-6.9)  


 


Basophils #     (0-0.4)  


 


Sodium     (137-145)  mmol/L


 


Potassium     (3.5-5.1)  mmol/L


 


Chloride     ()  mmol/L


 


Carbon Dioxide     (22-30)  mmol/L


 


Anion Gap     (5-15)  MEQ/L


 


BUN     (9-20)  mg/dL


 


Creatinine     (0.66-1.25)  mg/dL


 


Estimated GFR     ML/MIN


 


Glucose     ()  mg/dL


 


Calcium     (8.4-10.2)  mg/dL


 


Total Bilirubin     (0.2-1.3)  mg/dL


 


AST     (17-59)  U/L


 


ALT     (0-50)  U/L


 


Alkaline Phosphatase     ()  U/L


 


Troponin I   < 0.012  < 0.012  (0.000-0.034)  ng/mL


 


Serum Total Protein     (6.3-8.2)  g/dL


 


Albumin     (3.5-5.0)  g/dL


 


Lipase     ()  U/L


 


Ethyl Alcohol     (0-10)  mg/dL


 


SARS-CoV-2 (PCR)  NEGATIVE    (NEGATIVE)  














  07/28/21 07/28/21 07/28/21 Range/Units





  01:33 01:33 01:33 


 


WBC    9.7  (4.0-10.5)  K/mm3


 


RBC    6.45 H*  (4.1-5.6)  M/mm3


 


Hgb    19.6 H  (12.5-18.0)  gm/dl


 


Hct    56.8 H  (42-50)  %


 


MCV    88.1  ()  fl


 


MCH    30.4  (26-32)  pg


 


MCHC    34.5  (32-36)  g/dl


 


RDW    15.2 H  (11.5-14.0)  %


 


Plt Count    222  (150-450)  K/mm3


 


MPV    11.0  (7.5-11.0)  fl


 


Gran %    57.5  (36.0-66.0)  %


 


Eos # (Auto)    0.08  (0-0.5)  


 


Absolute Lymphs (auto)    3.23  (1.0-4.6)  


 


Absolute Monos (auto)    0.77  (0.0-1.3)  


 


Lymphocytes %    33.3  (24.0-44.0)  %


 


Monocytes %    7.9  (0.0-12.0)  %


 


Eosinophils %    0.8  (0.00-5.0)  %


 


Basophils %    0.5  (0.0-0.4)  %


 


Absolute Granulocytes    5.58  (1.4-6.9)  


 


Basophils #    0.05  (0-0.4)  


 


Sodium   133 L   (137-145)  mmol/L


 


Potassium   4.3   (3.5-5.1)  mmol/L


 


Chloride   95 L   ()  mmol/L


 


Carbon Dioxide   21 L   (22-30)  mmol/L


 


Anion Gap   21.0 H   (5-15)  MEQ/L


 


BUN   4 L   (9-20)  mg/dL


 


Creatinine   0.84   (0.66-1.25)  mg/dL


 


Estimated GFR   > 60.0   ML/MIN


 


Glucose   103   ()  mg/dL


 


Calcium   9.8   (8.4-10.2)  mg/dL


 


Total Bilirubin   0.90   (0.2-1.3)  mg/dL


 


AST   67 H   (17-59)  U/L


 


ALT   46   (0-50)  U/L


 


Alkaline Phosphatase   124   ()  U/L


 


Troponin I     (0.000-0.034)  ng/mL


 


Serum Total Protein   8.1   (6.3-8.2)  g/dL


 


Albumin   4.5   (3.5-5.0)  g/dL


 


Lipase   193   ()  U/L


 


Ethyl Alcohol  169 H    (0-10)  mg/dL


 


SARS-CoV-2 (PCR)     (NEGATIVE)  














- Progress


Progress: improved


Progress Note: 


Patient reassessed.  He is resting comfortably.  Urinalysis not collected as 

patient is incontinent.  CT scan shows a collapsed colon suggestive of a 

colitis.  Patient has been vomiting for approximately 2 weeks.  Patient does 

have history of alcoholism.  Upon presentation patient smells of alcohol and was

 positive for alcohol in his toxicology screen.  Patient lives independently at 

this time.  We will admit at this time for abdominal pain suspected colitis 

rehydration.  Case discussed with Dr. Larson who accepts admission to 

observation.  Plan of care discussed with patient.  He agrees to admission at 

[facility] for further evaluation and treatment.  

Patient is Covid negative.


07/28/21 07:14





Counseled pt/family regarding: lab results, diagnosis, rad results





- Departure


Departure Disposition: Home


Clinical Impression: 


 Hepatic steatosis, pancreatic tail calcification, Diverticulosis, Atherosc

lerotic cardiovascular disease, AAA (abdominal aortic aneurysm), 

Nephrolithiasis, Colitis, Urinary incontinence, Alcoholism, High anion gap 

metabolic acidosis





Condition: Stable


Critical Care Time: No


Referrals: 


ALVARO HERNANDEZ [Primary Care Provider] -

## 2021-10-10 ENCOUNTER — HOSPITAL ENCOUNTER (EMERGENCY)
Dept: HOSPITAL 33 - ED | Age: 61
Discharge: HOME | End: 2021-10-10
Payer: MEDICARE

## 2021-10-10 VITALS — DIASTOLIC BLOOD PRESSURE: 92 MMHG | OXYGEN SATURATION: 98 % | SYSTOLIC BLOOD PRESSURE: 117 MMHG | HEART RATE: 96 BPM

## 2021-10-10 DIAGNOSIS — R53.1: ICD-10-CM

## 2021-10-10 DIAGNOSIS — B35.3: ICD-10-CM

## 2021-10-10 DIAGNOSIS — E78.00: ICD-10-CM

## 2021-10-10 DIAGNOSIS — I10: ICD-10-CM

## 2021-10-10 DIAGNOSIS — M79.671: Primary | ICD-10-CM

## 2021-10-10 LAB
ALBUMIN SERPL-MCNC: 3.6 G/DL (ref 3.5–5)
ALP SERPL-CCNC: 167 U/L (ref 38–126)
ALT SERPL-CCNC: 35 U/L (ref 0–50)
ANION GAP SERPL CALC-SCNC: 12.9 MEQ/L (ref 5–15)
AST SERPL QL: 58 U/L (ref 17–59)
BASOPHILS # BLD AUTO: 0.04 10*3/UL (ref 0–0.4)
BASOPHILS NFR BLD AUTO: 0.5 % (ref 0–0.4)
BILIRUB BLD-MCNC: 1.1 MG/DL (ref 0.2–1.3)
BUN SERPL-MCNC: 6 MG/DL (ref 9–20)
CALCIUM SPEC-MCNC: 9.3 MG/DL (ref 8.4–10.2)
CHLORIDE SERPL-SCNC: 105 MMOL/L (ref 98–107)
CO2 SERPL-SCNC: 21 MMOL/L (ref 22–30)
CREAT SERPL-MCNC: 0.76 MG/DL (ref 0.66–1.25)
EOSINOPHIL # BLD AUTO: 0.01 10*3/UL (ref 0–0.5)
GFR SERPLBLD BASED ON 1.73 SQ M-ARVRAT: > 60 ML/MIN
GLUCOSE SERPL-MCNC: 117 MG/DL (ref 74–106)
GLUCOSE UR-MCNC: NEGATIVE MG/DL
HCT VFR BLD AUTO: 58.6 % (ref 42–50)
HGB BLD-MCNC: 20.1 GM/DL (ref 12.5–18)
LYMPHOCYTES # SPEC AUTO: 1.91 10*3/UL (ref 1–4.6)
MCH RBC QN AUTO: 29.8 PG (ref 26–32)
MCHC RBC AUTO-ENTMCNC: 34.3 G/DL (ref 32–36)
MONOCYTES # BLD AUTO: 0.7 10*3/UL (ref 0–1.3)
PLATELET # BLD AUTO: 158 K/MM3 (ref 150–450)
POTASSIUM SERPLBLD-SCNC: 4.5 MMOL/L (ref 3.5–5.1)
PROT SERPL-MCNC: 7 G/DL (ref 6.3–8.2)
PROT UR STRIP-MCNC: 100 MG/DL
RBC # BLD AUTO: 6.75 M/MM3 (ref 4.1–5.6)
RBC #/AREA URNS HPF: >101 /HPF (ref 0–2)
SODIUM SERPL-SCNC: 134 MMOL/L (ref 137–145)
WBC # BLD AUTO: 8.5 K/MM3 (ref 4–10.5)
WBC #/AREA URNS HPF: >100 /HPF (ref 0–5)

## 2021-10-10 PROCEDURE — 87186 SC STD MICRODIL/AGAR DIL: CPT

## 2021-10-10 PROCEDURE — 70450 CT HEAD/BRAIN W/O DYE: CPT

## 2021-10-10 PROCEDURE — 87077 CULTURE AEROBIC IDENTIFY: CPT

## 2021-10-10 PROCEDURE — 36415 COLL VENOUS BLD VENIPUNCTURE: CPT

## 2021-10-10 PROCEDURE — 73630 X-RAY EXAM OF FOOT: CPT

## 2021-10-10 PROCEDURE — 96374 THER/PROPH/DIAG INJ IV PUSH: CPT

## 2021-10-10 PROCEDURE — 93005 ELECTROCARDIOGRAM TRACING: CPT

## 2021-10-10 PROCEDURE — P9612 CATHETERIZE FOR URINE SPEC: HCPCS

## 2021-10-10 PROCEDURE — 36000 PLACE NEEDLE IN VEIN: CPT

## 2021-10-10 PROCEDURE — 84484 ASSAY OF TROPONIN QUANT: CPT

## 2021-10-10 PROCEDURE — 80053 COMPREHEN METABOLIC PANEL: CPT

## 2021-10-10 PROCEDURE — 93041 RHYTHM ECG TRACING: CPT

## 2021-10-10 PROCEDURE — 87086 URINE CULTURE/COLONY COUNT: CPT

## 2021-10-10 PROCEDURE — 85025 COMPLETE CBC W/AUTO DIFF WBC: CPT

## 2021-10-10 PROCEDURE — 81001 URINALYSIS AUTO W/SCOPE: CPT

## 2021-10-10 PROCEDURE — 99285 EMERGENCY DEPT VISIT HI MDM: CPT

## 2021-10-10 NOTE — XRAY
Indication: Stroke.



Multiple contiguous axial images obtained through the head without contrast.



Comparison: June 4, 2021.



Again large old left cerebral infarct.  No acute intracranial hemorrhage,

hydrocephalus, or mass effect.  Fourth ventricle is midline.  Bony calvarium

intact.  Mild mucosal thickening left maxillary sinus.  Remaining visualized

paranasal sinuses and mastoid air cells are clear.



Impression: Grossly stable old left cerebral infarct.  No acute intracranial

abnormalities.  Incidental left maxillary sinus disease.



Comment: [facility]  No critical discrepancy.

## 2021-10-10 NOTE — ERPHSYRPT
- History of Present Illness


Time Seen by Provider: 10/10/21 11:23


Source: patient


Exam Limitations: no limitations


Patient Subjective Stated Complaint: Right foot pain


Triage Nursing Assessment: Patient brought back to ED via EMS and transferred to

bed per with assist of 1. Patient A+O x3. Patient's skin pink, warm and dry. 

Patient complains of right foot pain that started today and is unable to bear 

weight. Patient denies injury. Strong and equal pulses noted to BLE. Patient 

complains of pain 5/10.  Patient does have red rash noted to right foot.


Physician History: 





61 years old male with multiple medical problems including CVA, wheelchair-

bound, on Eliquis presented in the ER with chief complaint of right foot pain 

since morning when he tried to get up and was not able to bear any weight on it.

 Pain is sharp moderate intensity, more with weightbearing and improves with 

resting.  He also noticed some skin lesions on the toes and lateral side of 

right foot.  Patient keeps socks and shoes all the time on.  No obvious swelling

of the foot.  No leg pain.  No swelling of the leg.  Denies any chest pain 

palpitations or shortness of breath.


Allergies/Adverse Reactions: 








No Known Drug Allergies Allergy (Verified 10/10/21 11:08)


   





Home Medications: 








Atorvastatin Calcium [Lipitor] 80 mg PO DAILY 01/11/18 [History]


Lisinopril 5 mg*** [Zestril 5 MG***] 5 mg PO DAILY 01/11/18 [History]


Tamsulosin HCl 0.4 mg*** [Flomax 0.4 MG***] 0.4 mg PO DAILY 01/11/18 [History]


Baclofen 10 mg*** [Lioresal 10 mg***] 10 mg PO TIDPRN PRN 08/10/19 [History]


Cyanocobalamin (Vitamin B-12) [Vitamin B-12] 1,000 mcg PO DAILY 07/28/21 

[History]


Duloxetine HCl 30 mg*** [Cymbalta 30 MG Capsule***] 30 mg PO DAILY 07/28/21 

[History]





Hx Tetanus, Diphtheria Vaccination/Date Given: Yes (2018)


Hx Influenza Vaccination/Date Given: No


Hx Pneumococcal Vaccination/Date Given: No


Immunizations Up to Date: Yes





Travel Risk





- International Travel


Have you traveled outside of the country in past 3 weeks: No





- Coronavirus Screening


Are you exhibiting any of the following symptoms?: No


Close contact with a COVID-19 positive Pt in past 14-21 Days: No





- Vaccine Status


Have you recieved a Covid-19 vaccination: No





- Review of Systems


Constitutional: No Symptoms


Ears, Nose, & Throat: No Symptoms


Respiratory: No Symptoms


Cardiac: No Symptoms


Abdominal/Gastrointestinal: No Symptoms


Genitourinary Symptoms: No Symptoms


Musculoskeletal: Joint Pain, Myalgias


Skin: Rash, Skin Lesions


Neurological: No Symptoms


Endocrine: No Symptoms


Hematologic/Lymphatic: No Symptoms





- Past Medical History


Pertinent Past Medical History: Yes


Neurological History: Stroke


ENT History: No Pertinent History


Cardiac History: High Cholesterol, Hypertension


Respiratory History: No Pertinent History


Endocrine Medical History: No Pertinent History


Musculoskeletal History: No Pertinent History


GI Medical History: Gallbladder Disease


 History: No Pertinent History


Psycho-Social History: No Pertinent History


Male Reproductive Disorders: Prostate Problems


Other Medical History: REPORTS RHUEMATIC FEVER AND HEPITITIS WHEN HE WAS YOUNG 

BUT OTHERISE NONSIGNIFICANT. Kidney stones.





- Past Surgical History


Past Surgical History: Yes


Neuro Surgical History: No Pertinent History


Cardiac: No Pertinent History


Respiratory: No Pertinent History


Gastrointestinal: No Pertinent History


Genitourinary: No Pertinent History


Musculoskeletal: Orthopedic Surgery


Male Surgical History: No Pertinent History


Other Surgical History: Colonoscopy





- Social History


Smoking Status: Current every day smoker


How long have you smoked: 40 years


Exposure to second hand smoke: Yes


Drug Use: none


Patient Lives Alone: Yes





- Nursing Vital Signs


Nursing Vital Signs: 


                               Initial Vital Signs











Temperature  97.6 F   10/10/21 11:09


 


Pulse Rate  117 H  10/10/21 11:09


 


Respiratory Rate  18   10/10/21 11:09


 


Blood Pressure  130/97   10/10/21 11:09


 


O2 Sat by Pulse Oximetry  98   10/10/21 11:09








                                   Pain Scale











Pain Intensity                 5

















- Physical Exam


General Appearance: no apparent distress, alert


Eyes, Ears, Nose, Throat Exam: normal ENT inspection


Cardiovascular/Respiratory Exam: normal breath sounds, heart sounds normal, 

tachycardia


Back Exam: normal inspection, normal range of motion


Legs Exam: bilateral leg: non-tender, normal inspection, normal range of motion,

 no evidence of injury


Knees Exam: bilateral knee: non-tender, normal inspection, normal range of 

motion, no evidence of injury


Ankle Exam: bilateral ankle: non-tender, normal inspection, normal range of 

motion, no evidence of injury


Foot Exam: right foot: bone tenderness (Anterior midfoot), limited range of 

motion, pain, soft tissue tenderness, other (Small vesicular rash with 

erythematous base and partial scaling on the sides of toes, lateral side of 

foot, tender.), left foot: non-tender, normal range of motion, bilateral foot: 

no evidence of injury


Neuro/Tendon Exam: normal sensation, normal motor functions


Mental Status Exam: alert, oriented x 3


**SpO2 Interpretation**: normal


SpO2: 98


O2 Delivery: Room Air


Ordered Tests: 


                               Active Orders 24 hr











 Category Date Time Status


 


 FOOT (MINIMUM 3 VIEWS) Stat Exams  10/10/21 12:35 Taken








Medication Summary











Generic Name Dose Route Start Last Admin





  Trade Name Freq  PRN Reason Stop Dose Admin


 


Acetaminophen  1,000 mg  10/10/21 12:57  10/10/21 13:03





  Tylenol Extra Strength 500 Mg***  PO  11/09/21 12:56  1,000 mg





  Q4H PRN PRN   Administration





  HEADACHE  


 


Clotrimazole  30 gm  10/10/21 22:00  10/10/21 13:03





  Lotrimin Cream 30 Gm***  TP  11/09/21 21:59  30 gm





  BID ROSA   Administration














- Progress


Progress: improved


Progress Note: 





10/10/21 12:59


X-rays are negative for fracture dislocation.  I believe patient has tinea 

pedis, started on clotrimazole.  Outpatient follow-up recommended.  Recommended 

keeping feet exposed to ear dry.  Patient is not ambulatory and recommended 

using cane or walker for standing if needed.  Outpatient follow-up.


Counseled pt/family regarding: diagnosis, need for follow-up, rad results





- Departure


Departure Disposition: Home


Clinical Impression: 


 Acute pain of right foot, Tinea pedis, right





Condition: Stable


Critical Care Time: No


Referrals: 


ALVARO HERNANDEZ MD [Primary Care Provider] -  (Call tomorrow for 

appointment.)


Instructions:  Athlete's Foot (DC)


Additional Instructions: 


take Tylenol as needed for pain, follow up with PCP for re evaluation. return to

ER for any worsening. apply cream twice a day . return to ER for worsening

## 2021-10-10 NOTE — XRAY
Indication: Pain.



Comparison: None



3 nonweightbearing views right foot demonstrates mild osteopenia.  No other

bony, articular, or soft tissue abnormalities.

## 2022-03-21 ENCOUNTER — HOSPITAL ENCOUNTER (OUTPATIENT)
Dept: HOSPITAL 33 - ED | Age: 62
Setting detail: OBSERVATION
LOS: 7 days | Discharge: NURSING, REHAB OR LONG-TERM CARE | End: 2022-03-28
Attending: FAMILY MEDICINE | Admitting: FAMILY MEDICINE
Payer: MEDICARE

## 2022-03-21 DIAGNOSIS — I69.951: ICD-10-CM

## 2022-03-21 DIAGNOSIS — S50.311A: ICD-10-CM

## 2022-03-21 DIAGNOSIS — Z20.828: ICD-10-CM

## 2022-03-21 DIAGNOSIS — R56.9: ICD-10-CM

## 2022-03-21 DIAGNOSIS — R53.1: ICD-10-CM

## 2022-03-21 DIAGNOSIS — R41.82: ICD-10-CM

## 2022-03-21 DIAGNOSIS — N39.0: Primary | ICD-10-CM

## 2022-03-21 DIAGNOSIS — N17.9: ICD-10-CM

## 2022-03-21 DIAGNOSIS — S60.512A: ICD-10-CM

## 2022-03-21 DIAGNOSIS — S40.811A: ICD-10-CM

## 2022-03-21 DIAGNOSIS — Z79.899: ICD-10-CM

## 2022-03-21 DIAGNOSIS — I26.99: ICD-10-CM

## 2022-03-21 DIAGNOSIS — S80.211A: ICD-10-CM

## 2022-03-21 DIAGNOSIS — S80.812A: ICD-10-CM

## 2022-03-21 DIAGNOSIS — I48.91: ICD-10-CM

## 2022-03-21 DIAGNOSIS — Z72.0: ICD-10-CM

## 2022-03-21 DIAGNOSIS — E78.00: ICD-10-CM

## 2022-03-21 DIAGNOSIS — I10: ICD-10-CM

## 2022-03-21 LAB
ALBUMIN SERPL-MCNC: 2.7 G/DL (ref 3.5–5)
ALP SERPL-CCNC: 89 U/L (ref 38–126)
ALT SERPL-CCNC: 9 U/L (ref 0–50)
AMPHETAMINES UR QL: NEGATIVE
ANION GAP SERPL CALC-SCNC: 12.4 MEQ/L (ref 5–15)
APTT PPP: 27.9 SECONDS (ref 25.1–36.5)
AST SERPL QL: 21 U/L (ref 17–59)
BARBITURATES UR QL: NEGATIVE
BENZODIAZ UR QL SCN: NEGATIVE
BILIRUB BLD-MCNC: 1.5 MG/DL (ref 0.2–1.3)
BNP SERPL-MCNC: 981 PG/ML (ref 0–900)
BUN SERPL-MCNC: 15 MG/DL (ref 9–20)
CALCIUM SPEC-MCNC: 8.2 MG/DL (ref 8.4–10.2)
CHLORIDE SERPL-SCNC: 109 MMOL/L (ref 98–107)
CK SERPL-CCNC: 36 U/L (ref 55–170)
CO2 SERPL-SCNC: 14 MMOL/L (ref 22–30)
COCAINE UR QL SCN: NEGATIVE
CREAT SERPL-MCNC: 0.81 MG/DL (ref 0.66–1.25)
ETHANOL SERPL-MCNC: < 10 MG/DL (ref 0–10)
FLUAV AG NPH QL IA: NEGATIVE
FLUBV AG NPH QL IA: NEGATIVE
GFR SERPLBLD BASED ON 1.73 SQ M-ARVRAT: > 60 ML/MIN
GLUCOSE SERPL-MCNC: 115 MG/DL (ref 74–106)
GLUCOSE SERPL-MCNC: 143 MG/DL (ref 74–106)
GLUCOSE UR-MCNC: NEGATIVE MG/DL
HCT VFR BLD AUTO: 37.5 % (ref 42–50)
HGB BLD-MCNC: 12.2 GM/DL (ref 12.5–18)
MCH RBC QN AUTO: 29.6 PG (ref 26–32)
MCHC RBC AUTO-ENTMCNC: 32.5 G/DL (ref 32–36)
METHADONE UR QL: NEGATIVE
OPIATES UR QL: NEGATIVE
PCP UR QL CFM>20 NG/ML: NEGATIVE
PLATELET # BLD AUTO: 284 K/MM3 (ref 150–450)
POTASSIUM SERPLBLD-SCNC: 4.2 MMOL/L (ref 3.5–5.1)
PROT SERPL-MCNC: 6 G/DL (ref 6.3–8.2)
PROT UR STRIP-MCNC: (no result) MG/DL
RBC # BLD AUTO: 4.12 M/MM3 (ref 4.1–5.6)
RBC # UR AUTO: (no result) ERY/UL (ref 0–5)
RSV AG SPEC QL IA: NEGATIVE
SARS-COV-2 AG RESP QL IA.RAPID: NEGATIVE
SODIUM SERPL-SCNC: 132 MMOL/L (ref 137–145)
THC UR QL SCN: POSITIVE
UA DIPSTICK PNL UR: (no result)
WBC # BLD AUTO: 11.1 K/MM3 (ref 4–10.5)
WBC #/AREA URNS HPF: (no result) /HPF (ref 0–5)

## 2022-03-21 PROCEDURE — 97165 OT EVAL LOW COMPLEX 30 MIN: CPT

## 2022-03-21 PROCEDURE — G0480 DRUG TEST DEF 1-7 CLASSES: HCPCS

## 2022-03-21 PROCEDURE — 71260 CT THORAX DX C+: CPT

## 2022-03-21 PROCEDURE — 84484 ASSAY OF TROPONIN QUANT: CPT

## 2022-03-21 PROCEDURE — 36415 COLL VENOUS BLD VENIPUNCTURE: CPT

## 2022-03-21 PROCEDURE — 87086 URINE CULTURE/COLONY COUNT: CPT

## 2022-03-21 PROCEDURE — 84134 ASSAY OF PREALBUMIN: CPT

## 2022-03-21 PROCEDURE — 96365 THER/PROPH/DIAG IV INF INIT: CPT

## 2022-03-21 PROCEDURE — 85730 THROMBOPLASTIN TIME PARTIAL: CPT

## 2022-03-21 PROCEDURE — 36000 PLACE NEEDLE IN VEIN: CPT

## 2022-03-21 PROCEDURE — 99285 EMERGENCY DEPT VISIT HI MDM: CPT

## 2022-03-21 PROCEDURE — 96361 HYDRATE IV INFUSION ADD-ON: CPT

## 2022-03-21 PROCEDURE — 80053 COMPREHEN METABOLIC PANEL: CPT

## 2022-03-21 PROCEDURE — 83605 ASSAY OF LACTIC ACID: CPT

## 2022-03-21 PROCEDURE — 82947 ASSAY GLUCOSE BLOOD QUANT: CPT

## 2022-03-21 PROCEDURE — 70551 MRI BRAIN STEM W/O DYE: CPT

## 2022-03-21 PROCEDURE — G0328 FECAL BLOOD SCRN IMMUNOASSAY: HCPCS

## 2022-03-21 PROCEDURE — 83550 IRON BINDING TEST: CPT

## 2022-03-21 PROCEDURE — 71045 X-RAY EXAM CHEST 1 VIEW: CPT

## 2022-03-21 PROCEDURE — 95812 EEG 41-60 MINUTES: CPT

## 2022-03-21 PROCEDURE — 51702 INSERT TEMP BLADDER CATH: CPT

## 2022-03-21 PROCEDURE — 97162 PT EVAL MOD COMPLEX 30 MIN: CPT

## 2022-03-21 PROCEDURE — 85025 COMPLETE CBC W/AUTO DIFF WBC: CPT

## 2022-03-21 PROCEDURE — 87186 SC STD MICRODIL/AGAR DIL: CPT

## 2022-03-21 PROCEDURE — 87077 CULTURE AEROBIC IDENTIFY: CPT

## 2022-03-21 PROCEDURE — 0241U: CPT

## 2022-03-21 PROCEDURE — 83540 ASSAY OF IRON: CPT

## 2022-03-21 PROCEDURE — 81001 URINALYSIS AUTO W/SCOPE: CPT

## 2022-03-21 PROCEDURE — 83880 ASSAY OF NATRIURETIC PEPTIDE: CPT

## 2022-03-21 PROCEDURE — 85379 FIBRIN DEGRADATION QUANT: CPT

## 2022-03-21 PROCEDURE — 93268 ECG RECORD/REVIEW: CPT

## 2022-03-21 PROCEDURE — G0378 HOSPITAL OBSERVATION PER HR: HCPCS

## 2022-03-21 PROCEDURE — 97530 THERAPEUTIC ACTIVITIES: CPT

## 2022-03-21 PROCEDURE — 82550 ASSAY OF CK (CPK): CPT

## 2022-03-21 PROCEDURE — 93005 ELECTROCARDIOGRAM TRACING: CPT

## 2022-03-21 PROCEDURE — 80307 DRUG TEST PRSMV CHEM ANLYZR: CPT

## 2022-03-21 PROCEDURE — 82274 ASSAY TEST FOR BLOOD FECAL: CPT

## 2022-03-21 PROCEDURE — 96360 HYDRATION IV INFUSION INIT: CPT

## 2022-03-21 PROCEDURE — 70450 CT HEAD/BRAIN W/O DYE: CPT

## 2022-03-21 PROCEDURE — 85027 COMPLETE CBC AUTOMATED: CPT

## 2022-03-21 NOTE — XRAY
Indication: Acute mental status change.



Comparison: June 4, 2021.



Portable chest again hyperinflated and clear.  Heart not enlarged.  Bony

thorax intact again with mild osteopenia, degenerative changes, and old right

8 rib fracture.



Impression: Continued nonacute hyperinflated chest with chronic bony findings.

## 2022-03-21 NOTE — XRAY
Indication: Acute mental status change.  Stroke.



Multiple contiguous axial images obtained through the head without contrast.



Comparison: October 10, 2021.



Again large old left cerebral infarct.  No acute intracranial hemorrhage,

hydrocephalus, or mass effect.  Fourth ventricle is midline.  Bony calvarium

intact.  Tiny fluid level in left maxillary sinus.  Remaining paranasal

sinuses and mastoid air cells are clear.



Impression: Grossly stable large old left cerebral infarct.  No new/acute

intracranial abnormalities.  Incidental left maxillary sinus disease.

## 2022-03-21 NOTE — ERPHSYRPT
- History of Present Illness


Time Seen by Provider: 03/21/22 16:00


Source: EMS


Exam Limitations: clinical condition


Patient Subjective Stated Complaint: Pt is confused but responsive to painful 

stimuli.


Triage Nursing Assessment: Pt presents to ER by ambulance from home. Pt was at 

home with home health care provider. EMS states patient got up to go use 

restroom and then had sudden change in mental status. Pt is confused and 

responsive to painful stimili upon arrival. Pupils 3mm bilaterally, PERRL. Pt 

has hx of seizures, stroke x 3, and alcoholism. Pt skin is pale and jaundice to 

lower extremitites. Pt has mulitple skin tears to elbow, hands, upper arm. Pt is

weak and lethargic. Respirations are shallow and slightly labored.


Physician History: 





Patient is a 61-year-old white male who presents with altered mental status of 

approximately 40 minutes duration by ambulance.  He was noted by EMS EMS to be 

in atrial fib.  Records indicate he has had atrial fib in the past he also has a

history of CVA in the past.  He has a history of heavy alcohol use but 

apparently stopped within the last month.


Timing/Duration: today


Severity: severe


Baseline/Normal Cognition: alert oriented x 3


Current Cognition: poor alertness


Allergies/Adverse Reactions: 








No Known Drug Allergies Allergy (Verified 03/21/22 16:58)


   





Home Medications: 








Atorvastatin Calcium [Lipitor] 80 mg PO DAILY 01/11/18 [History]


Lisinopril 5 mg*** [Zestril 5 MG***] 5 mg PO DAILY 01/11/18 [History]


Tamsulosin HCl 0.4 mg*** [Flomax 0.4 MG***] 0.4 mg PO DAILY 01/11/18 [History]


Baclofen 10 mg*** [Lioresal 10 mg***] 10 mg PO TIDPRN PRN 08/10/19 [History]


Cyanocobalamin (Vitamin B-12) [Vitamin B-12] 1,000 mcg PO DAILY 07/28/21 

[History]


Duloxetine HCl 30 mg*** [Cymbalta 30 MG Capsule***] 30 mg PO DAILY 07/28/21 

[History]





Hx Tetanus, Diphtheria Vaccination/Date Given: No (unknown)


Hx Influenza Vaccination/Date Given: No (unknown)


Hx Pneumococcal Vaccination/Date Given: No (unknown)


Immunizations Up to Date: No (unknown)





Travel Risk





- International Travel


Have you traveled outside of the country in past 3 weeks: No





- Coronavirus Screening


Are you exhibiting any of the following symptoms?: No


Close contact with a COVID-19 positive Pt in past 14-21 Days: No





- Vaccine Status


Have you recieved a Covid-19 vaccination: No (unknown)


: Unknown





- Vaccination Dates


Dates if Unknown: n/a





- Review of Systems


All Other Systems: Unable due to condition





- Past Medical History


Pertinent Past Medical History: Yes


Neurological History: Stroke


ENT History: No Pertinent History


Cardiac History: High Cholesterol, Hypertension


Respiratory History: No Pertinent History


Endocrine Medical History: No Pertinent History


Musculoskeletal History: No Pertinent History


GI Medical History: Gallbladder Disease


 History: No Pertinent History


Psycho-Social History: No Pertinent History


Male Reproductive Disorders: Prostate Problems


Other Medical History: REPORTS RHUEMATIC FEVER AND HEPITITIS WHEN HE WAS YOUNG 

BUT OTHERISE NONSIGNIFICANT. Kidney stones.





- Past Surgical History


Past Surgical History: Yes


Neuro Surgical History: No Pertinent History


Cardiac: No Pertinent History


Respiratory: No Pertinent History


Gastrointestinal: No Pertinent History


Genitourinary: No Pertinent History


Musculoskeletal: Orthopedic Surgery


Male Surgical History: No Pertinent History


Other Surgical History: Colonoscopy





- Social History


Smoking Status: Unknown if ever smoked


How long have you smoked: 40 years


Exposure to second hand smoke: No (unknown)


Drug Use: none


Patient Lives Alone: Yes (yes? with home health care)





- Nursing Vital Signs


Nursing Vital Signs: 


                               Initial Vital Signs











Temperature  96.7 F   03/21/22 15:51


 


Pulse Rate  67   03/21/22 15:51


 


Respiratory Rate  24   03/21/22 15:51


 


Blood Pressure  76/50   03/21/22 15:51


 


O2 Sat by Pulse Oximetry  97   03/21/22 15:51








                                   Pain Scale











Pain Intensity                 0

















- [place] Coma Scale


Best Eye Response ([place]): (3) open to voice


Best Verbal Response ([place]): (3) inappropriate words


Best Motor Response ([place]): (4) withdraws to pain


[place] Total: 10





- Physical Exam


General Appearance: moderate distress, lethargy


Eye Exam: bilateral eye: PERRL, EOMI


Ears, Nose, Throat Exam: normal ENT inspection, moist mucous membranes


Neck Exam: normal inspection, non-tender, supple


Respiratory: normal breath sounds, lungs clear, airway intact, No respiratory 

distress


Cardiovascular: tachycardia, irregular, No edema


Gastrointestinal: soft, normal bowel sounds, No mass


Back Exam: normal inspection, normal range of motion


Extremity Exam: normal inspection, normal range of motion


Mental Status: unresponsive


CNs Exam: PERRL, No abnormal eye position


Skin Exam: normal color, warm, dry


**SpO2 Interpretation**: normal


SpO2: 94


O2 Delivery: Room Air





- Course


Nursing assessment & vital signs reviewed: Yes


EKG Interpreted by Me: RATE (120), A-fib, NORMAL AXIS, Non-specific ST Changes





- Radiology Exams


  ** Chest


X-ray Interpretation: Negative





- CT Exams


  ** Head


CT Interpretation: Other (CT of the head shows stable old CVA)





  ** Chest


CT Interpretation: Other (There were no comparison films.  Nonoccluding PE 

distal right main extending to the right lower lobe with nonocclusive PE in the 

left lower lung.  Some emphysema small effusions multiple old right rib 

fractures)


Ordered Tests: 


                               Active Orders 24 hr











 Category Date Time Status


 


 EKG-ER Only STAT Care  03/21/22 15:55 Active


 


 Hassan [Catheter-M[place] Hassan] STAT Care  03/21/22 16:35 Active


 


 IV Insertion STAT Care  03/21/22 15:55 Active


 


 IV Insertion-2nd Peripheral STAT Care  03/21/22 15:55 Active


 


 NPO (ED) STAT Care  03/21/22 15:55 Active


 


 Oxygen-ED Only Nasal Cannula 6 lpm Care  03/21/22 18:03 Active


 


 CHEST 1 VIEW (PORTABLE) Stat Exams  03/21/22 15:56 Completed


 


 CHEST WITH CONTRAST [CT] Stat Exams  03/21/22 18:16 Taken


 


 HEAD WITHOUT CONTRAST [CT] Stat Exams  03/21/22 15:56 Completed


 


 CBC W DIFF Stat Lab  03/21/22 15:55 Completed


 


 CK-Creatinine Phosphokinase Stat Lab  03/21/22 16:30 Completed


 


 CMP Stat Lab  03/21/22 19:08 Ordered


 


 CULTURE,URINE Stat Lab  03/21/22 16:35 Received


 


 D-DIMER QUANTITATIVE Stat Lab  03/21/22 15:55 Completed


 


 ETHYL ALCOHOL Stat Lab  03/21/22 16:30 Completed


 


 GLUCOSE,RANDOM Stat Lab  03/21/22 16:30 Completed


 


 Lactic Acid Stat Lab  03/21/22 16:15 Completed


 


 Lactic Acid Stat Lab  03/21/22 18:34 Completed


 


 Manual Differential NC Stat Lab  03/21/22 15:55 Completed


 


 NT PRO BNP Stat Lab  03/21/22 16:30 Completed


 


 PTT Stat Lab  03/21/22 15:55 Completed


 


 TROPONIN Q3H Lab  03/21/22 16:30 Completed


 


 TROPONIN Q3H Lab  03/21/22 19:00 Ordered


 


 TROPONIN Q3H Lab  03/21/22 22:00 Ordered


 


 TROPONIN Q3H Lab  03/22/22 01:00 Ordered


 


 TROPONIN Q3H Lab  03/22/22 04:00 Ordered


 


 Urine Triage Profile Stat Lab  03/21/22 16:35 Completed








Medication Summary














Discontinued Medications














Generic Name Dose Route Start Last Admin





  Trade Name Brianna  PRN Reason Stop Dose Admin


 


Sodium Chloride  Confirm  03/21/22 15:52 





  Sodium Chloride 0.9% 1000 Ml  Administered  03/21/22 15:53 





  Dose  





  1,000 mls @ ud  





  .ROUTE  





  .STK-MED ONE  


 


Sodium Chloride  1,000 mls @ 999 mls/hr  03/21/22 17:12  03/21/22 18:16





  Sodium Chloride 0.9% 1000 Ml  IV  03/21/22 18:12  Infused





  .Q1H1M STA   Infusion


 


Sodium Chloride  Confirm  03/21/22 17:12 





  Sodium Chloride 0.9% 1000 Ml  Administered  03/21/22 17:13 





  Dose  





  1,000 mls @ ud  





  .ROUTE  





  .STK-MED ONE  


 


Sodium Chloride  1,000 mls @ 999 mls/hr  03/21/22 16:05  03/21/22 17:11





  Sodium Chloride 0.9% 1000 Ml  IV  03/21/22 17:05  Infused





  .Q1H1M STA   Infusion


 


Ceftriaxone Sodium/Dextrose  1 g in 50 mls @ 100 mls/hr  03/21/22 18:43  

03/21/22 19:21





  Rocephin 1 Gm-D5w 50 Ml Bag**  IV  03/21/22 19:12  Infused





  STAT STA   Infusion


 


Ceftriaxone Sodium/Dextrose  Confirm  03/21/22 18:47 





  Rocephin 1 Gm-D5w 50 Ml Bag**  Administered  03/21/22 18:48 





  Dose  





  1 g in 50 mls @ ud  





  IV  





  .STK-MED ONE  











Lab/Rad Data: 


                           Laboratory Result Diagrams





                                 03/21/22 15:55 





                                 03/21/22 20:00 





                               Laboratory Results











  03/21/22 03/21/22 03/21/22 Range/Units





  20:00 18:34 16:35 


 


WBC     (4.0-10.5)  K/mm3


 


RBC     (4.1-5.6)  M/mm3


 


Hgb     (12.5-18.0)  gm/dl


 


Hct     (42-50)  %


 


MCV     ()  fl


 


MCH     (26-32)  pg


 


MCHC     (32-36)  g/dl


 


RDW     (11.5-14.0)  %


 


Plt Count     (150-450)  K/mm3


 


MPV     (7.5-11.0)  fl


 


APTT     (25.1-36.5)  SECONDS


 


D-Dimer     (215-500)  ng/mL


 


Sodium  132 L    (137-145)  mmol/L


 


Potassium  4.2    (3.5-5.1)  mmol/L


 


Chloride  109 H    ()  mmol/L


 


Carbon Dioxide  14 L*    (22-30)  mmol/L


 


Anion Gap  12.4    (5-15)  MEQ/L


 


BUN  15    (9-20)  mg/dL


 


Creatinine  0.81    (0.66-1.25)  mg/dL


 


Estimated GFR  > 60.0    ML/MIN


 


Glucose  115 H    ()  mg/dL


 


Lactic Acid   1.3   (0.4-2.0)  


 


Calcium  8.2 L    (8.4-10.2)  mg/dL


 


Total Bilirubin  1.50 H    (0.2-1.3)  mg/dL


 


AST  21    (17-59)  U/L


 


ALT  9    (0-50)  U/L


 


Alkaline Phosphatase  89    ()  U/L


 


Creatine Kinase     ()  U/L


 


Troponin I     (0.000-0.034)  ng/mL


 


NT-Pro-B Natriuret Pep     (0-900)  pg/mL


 


Serum Total Protein  6.0 L    (6.3-8.2)  g/dL


 


Albumin  2.7 L    (3.5-5.0)  g/dL


 


Urinalys Dipstick Clnc     


 


Urine Color     (YELLOW)  


 


Urine Appearance     (CLEAR)  


 


Urine pH     (5-6)  


 


Ur Specific Gravity     (1.005-1.025)  


 


POC Urine Protein Conf     (Negative)  


 


Urine Ketones     (NEGATIVE)  


 


Urine Nitrite     (NEGATIVE)  


 


Urine Bilirubin     (NEGATIVE)  


 


Urine Urobilinogen     (0-1)  mg/dL


 


Urine Leukocytes     (NEGATIVE)  


 


Urine WBC (Auto)     (0-5)  /HPF


 


Urine RBC (Auto)     (0-2)  /HPF


 


U Epithel Cells (Auto)     (FEW)  /HPF


 


Urine Bacteria (Auto)     (NEGATIVE)  /HPF


 


Urine RBC     (0-5)  Blake/ul


 


Urine Mucus (Auto)     (NEGATIVE)  /HPF


 


Urine Glucose     (NEGATIVE)  mg/dL


 


Urine Opiates Level    NEGATIVE  (NEGATIVE)  


 


Ur Methadone    NEGATIVE  (NEGATIVE)  


 


Urine Barbiturates    NEGATIVE  (NEGATIVE)  


 


Ur Phencyclidine (PCP)    NEGATIVE  (NEGATIVE)  


 


Urine Amphetamine    NEGATIVE  (NEGATIVE)  


 


U Benzodiazepine Level    NEGATIVE  (NEGATIVE)  


 


Urine Cocaine    NEGATIVE  (NEGATIVE)  


 


Urine Marijuana (THC)    POSITIVE  (NEGATIVE)  


 


Ethyl Alcohol     (0-10)  mg/dL














  03/21/22 03/21/22 03/21/22 Range/Units





  16:35 16:30 16:30 


 


WBC     (4.0-10.5)  K/mm3


 


RBC     (4.1-5.6)  M/mm3


 


Hgb     (12.5-18.0)  gm/dl


 


Hct     (42-50)  %


 


MCV     ()  fl


 


MCH     (26-32)  pg


 


MCHC     (32-36)  g/dl


 


RDW     (11.5-14.0)  %


 


Plt Count     (150-450)  K/mm3


 


MPV     (7.5-11.0)  fl


 


APTT     (25.1-36.5)  SECONDS


 


D-Dimer     (215-500)  ng/mL


 


Sodium     (137-145)  mmol/L


 


Potassium     (3.5-5.1)  mmol/L


 


Chloride     ()  mmol/L


 


Carbon Dioxide     (22-30)  mmol/L


 


Anion Gap     (5-15)  MEQ/L


 


BUN     (9-20)  mg/dL


 


Creatinine     (0.66-1.25)  mg/dL


 


Estimated GFR     ML/MIN


 


Glucose    143 H  ()  mg/dL


 


Lactic Acid     (0.4-2.0)  


 


Calcium     (8.4-10.2)  mg/dL


 


Total Bilirubin     (0.2-1.3)  mg/dL


 


AST     (17-59)  U/L


 


ALT     (0-50)  U/L


 


Alkaline Phosphatase     ()  U/L


 


Creatine Kinase    36 L  ()  U/L


 


Troponin I   < 0.012   (0.000-0.034)  ng/mL


 


NT-Pro-B Natriuret Pep    981 H  (0-900)  pg/mL


 


Serum Total Protein     (6.3-8.2)  g/dL


 


Albumin     (3.5-5.0)  g/dL


 


Urinalys Dipstick Clnc  MAIN LAB    


 


Urine Color  DARK YELLOW    (YELLOW)  


 


Urine Appearance  SLIGHTLY CLOUDY    (CLEAR)  


 


Urine pH  6.0    (5-6)  


 


Ur Specific Gravity  1.025    (1.005-1.025)  


 


POC Urine Protein Conf  TRACE    (Negative)  


 


Urine Ketones  NEGATIVE    (NEGATIVE)  


 


Urine Nitrite  POSITIVE    (NEGATIVE)  


 


Urine Bilirubin  SMALL    (NEGATIVE)  


 


Urine Urobilinogen  2    (0-1)  mg/dL


 


Urine Leukocytes  TRACE    (NEGATIVE)  


 


Urine WBC (Auto)  26-50    (0-5)  /HPF


 


Urine RBC (Auto)  11-15    (0-2)  /HPF


 


U Epithel Cells (Auto)  NONE    (FEW)  /HPF


 


Urine Bacteria (Auto)  FEW    (NEGATIVE)  /HPF


 


Urine RBC  TRACE-INTACT    (0-5)  Blake/ul


 


Urine Mucus (Auto)  SLIGHT    (NEGATIVE)  /HPF


 


Urine Glucose  NEGATIVE    (NEGATIVE)  mg/dL


 


Urine Opiates Level     (NEGATIVE)  


 


Ur Methadone     (NEGATIVE)  


 


Urine Barbiturates     (NEGATIVE)  


 


Ur Phencyclidine (PCP)     (NEGATIVE)  


 


Urine Amphetamine     (NEGATIVE)  


 


U Benzodiazepine Level     (NEGATIVE)  


 


Urine Cocaine     (NEGATIVE)  


 


Urine Marijuana (THC)     (NEGATIVE)  


 


Ethyl Alcohol    < 10  (0-10)  mg/dL














  03/21/22 03/21/22 03/21/22 Range/Units





  16:15 15:55 15:55 


 


WBC    11.1 H  (4.0-10.5)  K/mm3


 


RBC    4.12  (4.1-5.6)  M/mm3


 


Hgb    12.2 L  (12.5-18.0)  gm/dl


 


Hct    37.5 L  (42-50)  %


 


MCV    91.0  ()  fl


 


MCH    29.6  (26-32)  pg


 


MCHC    32.5  (32-36)  g/dl


 


RDW    16.9 H  (11.5-14.0)  %


 


Plt Count    284  (150-450)  K/mm3


 


MPV    11.3 H  (7.5-11.0)  fl


 


APTT   27.9   (25.1-36.5)  SECONDS


 


D-Dimer   3898 H*   (215-500)  ng/mL


 


Sodium     (137-145)  mmol/L


 


Potassium     (3.5-5.1)  mmol/L


 


Chloride     ()  mmol/L


 


Carbon Dioxide     (22-30)  mmol/L


 


Anion Gap     (5-15)  MEQ/L


 


BUN     (9-20)  mg/dL


 


Creatinine     (0.66-1.25)  mg/dL


 


Estimated GFR     ML/MIN


 


Glucose     ()  mg/dL


 


Lactic Acid  6.1 H    (0.4-2.0)  


 


Calcium     (8.4-10.2)  mg/dL


 


Total Bilirubin     (0.2-1.3)  mg/dL


 


AST     (17-59)  U/L


 


ALT     (0-50)  U/L


 


Alkaline Phosphatase     ()  U/L


 


Creatine Kinase     ()  U/L


 


Troponin I     (0.000-0.034)  ng/mL


 


NT-Pro-B Natriuret Pep     (0-900)  pg/mL


 


Serum Total Protein     (6.3-8.2)  g/dL


 


Albumin     (3.5-5.0)  g/dL


 


Urinalys Dipstick Clnc     


 


Urine Color     (YELLOW)  


 


Urine Appearance     (CLEAR)  


 


Urine pH     (5-6)  


 


Ur Specific Gravity     (1.005-1.025)  


 


POC Urine Protein Conf     (Negative)  


 


Urine Ketones     (NEGATIVE)  


 


Urine Nitrite     (NEGATIVE)  


 


Urine Bilirubin     (NEGATIVE)  


 


Urine Urobilinogen     (0-1)  mg/dL


 


Urine Leukocytes     (NEGATIVE)  


 


Urine WBC (Auto)     (0-5)  /HPF


 


Urine RBC (Auto)     (0-2)  /HPF


 


U Epithel Cells (Auto)     (FEW)  /HPF


 


Urine Bacteria (Auto)     (NEGATIVE)  /HPF


 


Urine RBC     (0-5)  Blake/ul


 


Urine Mucus (Auto)     (NEGATIVE)  /HPF


 


Urine Glucose     (NEGATIVE)  mg/dL


 


Urine Opiates Level     (NEGATIVE)  


 


Ur Methadone     (NEGATIVE)  


 


Urine Barbiturates     (NEGATIVE)  


 


Ur Phencyclidine (PCP)     (NEGATIVE)  


 


Urine Amphetamine     (NEGATIVE)  


 


U Benzodiazepine Level     (NEGATIVE)  


 


Urine Cocaine     (NEGATIVE)  


 


Urine Marijuana (THC)     (NEGATIVE)  


 


Ethyl Alcohol     (0-10)  mg/dL














- Progress


Progress: unchanged


Discussed with : Dominguez


Will see patient in: hospital (observation)





- Departure


Departure Disposition: Observation


Clinical Impression: 


 Atrial fibrillation, Pulmonary emboli, Altered mental status, Seizure, Alcohol 

withdrawal





Condition: Fair


Critical Care Time: No


Referrals: 


ALVARO HERNANDEZ MD [Primary Care Provider] - Follow up/PCP as directed

## 2022-03-22 LAB
ALBUMIN SERPL-MCNC: 2.8 G/DL (ref 3.5–5)
ALP SERPL-CCNC: 84 U/L (ref 38–126)
ALT SERPL-CCNC: 9 U/L (ref 0–50)
ANION GAP SERPL CALC-SCNC: 11.1 MEQ/L (ref 5–15)
AST SERPL QL: 24 U/L (ref 17–59)
BASOPHILS # BLD AUTO: 0.03 10*3/UL (ref 0–0.4)
BILIRUB BLD-MCNC: 1.6 MG/DL (ref 0.2–1.3)
BUN SERPL-MCNC: 14 MG/DL (ref 9–20)
CALCIUM SPEC-MCNC: 8.6 MG/DL (ref 8.4–10.2)
CELLS COUNTED: 100
CHLORIDE SERPL-SCNC: 108 MMOL/L (ref 98–107)
CO2 SERPL-SCNC: 18 MMOL/L (ref 22–30)
CREAT SERPL-MCNC: 0.83 MG/DL (ref 0.66–1.25)
EOSINOPHIL # BLD AUTO: 0.06 10*3/UL (ref 0–0.5)
GFR SERPLBLD BASED ON 1.73 SQ M-ARVRAT: > 60 ML/MIN
GLUCOSE SERPL-MCNC: 104 MG/DL (ref 74–106)
HCT VFR BLD AUTO: 28.4 % (ref 42–50)
HGB BLD-MCNC: 9.4 GM/DL (ref 12.5–18)
LYMPHOCYTES # SPEC AUTO: 1.86 10*3/UL (ref 1–4.6)
MANUAL DIF COMMENT BLD-IMP: NORMAL
MCH RBC QN AUTO: 29.8 PG (ref 26–32)
MCHC RBC AUTO-ENTMCNC: 33.1 G/DL (ref 32–36)
MONOCYTES # BLD AUTO: 0.35 10*3/UL (ref 0–1.3)
PLATELET # BLD AUTO: 211 K/MM3 (ref 150–450)
POTASSIUM SERPLBLD-SCNC: 4.6 MMOL/L (ref 3.5–5.1)
PREALB SERPL-MCNC: 7.83 MG/DL (ref 17.6–36)
PROT SERPL-MCNC: 5.9 G/DL (ref 6.3–8.2)
RBC # BLD AUTO: 3.15 M/MM3 (ref 4.1–5.6)
SODIUM SERPL-SCNC: 133 MMOL/L (ref 137–145)
WBC # BLD AUTO: 6.3 K/MM3 (ref 4–10.5)

## 2022-03-22 RX ADMIN — GEMFIBROZIL SCH MG: 600 TABLET ORAL at 21:04

## 2022-03-22 RX ADMIN — TAMSULOSIN HYDROCHLORIDE SCH MG: 0.4 CAPSULE ORAL at 09:55

## 2022-03-22 RX ADMIN — DULOXETINE HYDROCHLORIDE SCH MG: 30 CAPSULE, DELAYED RELEASE ORAL at 09:56

## 2022-03-22 RX ADMIN — SIMVASTATIN SCH MG: 20 TABLET, FILM COATED ORAL at 09:56

## 2022-03-22 RX ADMIN — GEMFIBROZIL SCH MG: 600 TABLET ORAL at 10:26

## 2022-03-22 RX ADMIN — CEFTRIAXONE SCH MLS/HR: 1 INJECTION, SOLUTION INTRAVENOUS at 10:27

## 2022-03-22 RX ADMIN — CYANOCOBALAMIN TAB 500 MCG SCH MCG: 500 TAB at 09:56

## 2022-03-22 RX ADMIN — PANTOPRAZOLE SODIUM SCH MG: 40 TABLET, DELAYED RELEASE ORAL at 09:56

## 2022-03-22 RX ADMIN — MIRTAZAPINE SCH MG: 15 TABLET, FILM COATED ORAL at 10:26

## 2022-03-22 RX ADMIN — ENOXAPARIN SODIUM SCH MG: 100 INJECTION SUBCUTANEOUS at 21:04

## 2022-03-22 RX ADMIN — ASPIRIN SCH MG: 81 TABLET, COATED ORAL at 09:56

## 2022-03-22 RX ADMIN — ENOXAPARIN SODIUM SCH MG: 100 INJECTION SUBCUTANEOUS at 09:56

## 2022-03-22 NOTE — XRAY
Indication: Acute mental status change.  Weakness.  History stroke.



Sagittal, coronal, and axial MRI brain performed without contrast using T1,

T2, FLAIR, diffusion, and ADC sequences.



Comparison: June 25, 2021.



Age-appropriate atrophy with new minimal right periventricular degenerative

micro-ischemia signal.  Grossly stable large old left frontoparietal infarct

with underlying encephalomalacia and gliosis.  Brainstem demonstrates minimal

progressive worsening degenerative micro-ischemia signal.  Diffusion images

are negative for restricted signal.  No acute intracranial hemorrhage,

hydrocephalus, or mass effect.  Fourth ventricle is midline.  7/8 cranial

nerve complex bilaterally symmetric.  Continued occluded left internal carotid

artery.  Normal flow void signal within the remaining major intracerebral

circulation.  Normal appearing craniocervical junction and sella turcica.

Mild mucosal thickening left maxillary sinus.



Impression:

1.  Grossly stable large old left frontoparietal infarct and left internal

carotid artery occlusion.

2.  No acute intracranial abnormalities or evidence for evolving stroke.

3.  Atrophy with progressive degenerative micro-ischemia.

4.  Incidental paranasal sinus disease.

## 2022-03-22 NOTE — XRAY
Indication: CVA.  Elevated d-dimer.



Multiple contiguous images obtained through the chest using 100 cc Isovue 370

contrast and PE protocol.



Comparison: None



Beam artifact from patient's arms.  There is good opacification of the

pulmonary arteries to include the lobar and segmental branches.  Distal right

main pulmonary artery demonstrates nonoccluding pulmonary emboli slightly

extending into the right lower lobe branch.  Lesser nonoccluding pulmonary

emboli seen in the left lower lobe branch.  No distal pulmonary infarct.

Heart not enlarged.  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

saccular aneurysm.  No pathologic mediastinal/hilar lymphadenopathy.



Lungs demonstrate diffuse pulmonary emphysema and scattered fibrosis/scarring.

 Posterior right lower lobe demonstrates moderate infiltrate/ectasis with tiny

effusion.



Bony thorax demonstrates multiple old united and ununited right rib fractures.

 Limited upper abdomen unremarkable.



Impression:

1.  Nonoccluding bilateral pulmonary emboli as detailed.

2.  Posterior right lower lobe infiltrate/atelectasis with tiny effusion.

3.  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

4.  Pulmonary emphysema.

## 2022-03-23 LAB
ALBUMIN SERPL-MCNC: 2.7 G/DL (ref 3.5–5)
ALP SERPL-CCNC: 86 U/L (ref 38–126)
ALT SERPL-CCNC: 9 U/L (ref 0–50)
ANION GAP SERPL CALC-SCNC: 9.5 MEQ/L (ref 5–15)
AST SERPL QL: 24 U/L (ref 17–59)
BASOPHILS # BLD AUTO: 0.06 10*3/UL (ref 0–0.4)
BILIRUB BLD-MCNC: 1.9 MG/DL (ref 0.2–1.3)
BUN SERPL-MCNC: 10 MG/DL (ref 9–20)
CALCIUM SPEC-MCNC: 8.3 MG/DL (ref 8.4–10.2)
CHLORIDE SERPL-SCNC: 108 MMOL/L (ref 98–107)
CO2 SERPL-SCNC: 17 MMOL/L (ref 22–30)
CREAT SERPL-MCNC: 0.75 MG/DL (ref 0.66–1.25)
EOSINOPHIL # BLD AUTO: 0.04 10*3/UL (ref 0–0.5)
GFR SERPLBLD BASED ON 1.73 SQ M-ARVRAT: > 60 ML/MIN
GLUCOSE SERPL-MCNC: 91 MG/DL (ref 74–106)
HCT VFR BLD AUTO: 26.6 % (ref 42–50)
HGB BLD-MCNC: 8.9 GM/DL (ref 12.5–18)
LYMPHOCYTES # SPEC AUTO: 1.26 10*3/UL (ref 1–4.6)
MCH RBC QN AUTO: 29.8 PG (ref 26–32)
MCHC RBC AUTO-ENTMCNC: 33.5 G/DL (ref 32–36)
MONOCYTES # BLD AUTO: 0.23 10*3/UL (ref 0–1.3)
PLATELET # BLD AUTO: 198 K/MM3 (ref 150–450)
POTASSIUM SERPLBLD-SCNC: 3.6 MMOL/L (ref 3.5–5.1)
PROT SERPL-MCNC: 5.9 G/DL (ref 6.3–8.2)
RBC # BLD AUTO: 2.99 M/MM3 (ref 4.1–5.6)
SODIUM SERPL-SCNC: 131 MMOL/L (ref 137–145)
WBC # BLD AUTO: 4.3 K/MM3 (ref 4–10.5)

## 2022-03-23 RX ADMIN — CEFTRIAXONE SCH MLS/HR: 1 INJECTION, SOLUTION INTRAVENOUS at 09:11

## 2022-03-23 RX ADMIN — PANTOPRAZOLE SODIUM SCH MG: 40 TABLET, DELAYED RELEASE ORAL at 09:08

## 2022-03-23 RX ADMIN — ASPIRIN SCH MG: 81 TABLET, COATED ORAL at 09:08

## 2022-03-23 RX ADMIN — METOPROLOL SUCCINATE SCH MG: 50 TABLET, EXTENDED RELEASE ORAL at 10:48

## 2022-03-23 RX ADMIN — ENOXAPARIN SODIUM SCH MG: 100 INJECTION SUBCUTANEOUS at 09:12

## 2022-03-23 RX ADMIN — SIMVASTATIN SCH MG: 20 TABLET, FILM COATED ORAL at 09:08

## 2022-03-23 RX ADMIN — DULOXETINE HYDROCHLORIDE SCH MG: 30 CAPSULE, DELAYED RELEASE ORAL at 09:08

## 2022-03-23 RX ADMIN — APIXABAN SCH MG: 2.5 TABLET, FILM COATED ORAL at 21:25

## 2022-03-23 RX ADMIN — GEMFIBROZIL SCH MG: 600 TABLET ORAL at 09:08

## 2022-03-23 RX ADMIN — TAMSULOSIN HYDROCHLORIDE SCH MG: 0.4 CAPSULE ORAL at 09:08

## 2022-03-23 RX ADMIN — MIRTAZAPINE SCH MG: 15 TABLET, FILM COATED ORAL at 09:08

## 2022-03-23 RX ADMIN — CYANOCOBALAMIN TAB 500 MCG SCH MCG: 500 TAB at 09:08

## 2022-03-24 LAB
ALBUMIN SERPL-MCNC: 2.6 G/DL (ref 3.5–5)
ALP SERPL-CCNC: 78 U/L (ref 38–126)
ALT SERPL-CCNC: 10 U/L (ref 0–50)
ANION GAP SERPL CALC-SCNC: 10.5 MEQ/L (ref 5–15)
AST SERPL QL: 21 U/L (ref 17–59)
BILIRUB BLD-MCNC: 1.5 MG/DL (ref 0.2–1.3)
BUN SERPL-MCNC: 7 MG/DL (ref 9–20)
CALCIUM SPEC-MCNC: 8.1 MG/DL (ref 8.4–10.2)
CHLORIDE SERPL-SCNC: 104 MMOL/L (ref 98–107)
CO2 SERPL-SCNC: 19 MMOL/L (ref 22–30)
CREAT SERPL-MCNC: 0.73 MG/DL (ref 0.66–1.25)
GFR SERPLBLD BASED ON 1.73 SQ M-ARVRAT: > 60 ML/MIN
GLUCOSE SERPL-MCNC: 110 MG/DL (ref 74–106)
HCT VFR BLD AUTO: 27.4 % (ref 42–50)
HGB BLD-MCNC: 8.9 GM/DL (ref 12.5–18)
IRON SERPL-MCNC: 78 UG/DL (ref 49–181)
IRON+TIBC PNL SERPL: 196 UG/DL (ref 261–497)
MCH RBC QN AUTO: 29.7 PG (ref 26–32)
MCHC RBC AUTO-ENTMCNC: 32.5 G/DL (ref 32–36)
PLATELET # BLD AUTO: 201 K/MM3 (ref 150–450)
POTASSIUM SERPLBLD-SCNC: 3.4 MMOL/L (ref 3.5–5.1)
PROT SERPL-MCNC: 5.7 G/DL (ref 6.3–8.2)
RBC # BLD AUTO: 3 M/MM3 (ref 4.1–5.6)
SODIUM SERPL-SCNC: 130 MMOL/L (ref 137–145)
WBC # BLD AUTO: 4.2 K/MM3 (ref 4–10.5)

## 2022-03-24 RX ADMIN — PANTOPRAZOLE SODIUM SCH MG: 40 TABLET, DELAYED RELEASE ORAL at 09:46

## 2022-03-24 RX ADMIN — CYANOCOBALAMIN TAB 500 MCG SCH MCG: 500 TAB at 09:45

## 2022-03-24 RX ADMIN — SIMVASTATIN SCH MG: 20 TABLET, FILM COATED ORAL at 09:46

## 2022-03-24 RX ADMIN — APIXABAN SCH MG: 2.5 TABLET, FILM COATED ORAL at 09:45

## 2022-03-24 RX ADMIN — LEVOFLOXACIN SCH MG: 500 TABLET, FILM COATED ORAL at 11:12

## 2022-03-24 RX ADMIN — APIXABAN SCH MG: 2.5 TABLET, FILM COATED ORAL at 21:33

## 2022-03-24 RX ADMIN — DULOXETINE HYDROCHLORIDE SCH MG: 30 CAPSULE, DELAYED RELEASE ORAL at 09:45

## 2022-03-24 RX ADMIN — TAMSULOSIN HYDROCHLORIDE SCH MG: 0.4 CAPSULE ORAL at 09:46

## 2022-03-24 RX ADMIN — METOPROLOL SUCCINATE SCH MG: 50 TABLET, EXTENDED RELEASE ORAL at 09:45

## 2022-03-24 RX ADMIN — MIRTAZAPINE SCH MG: 15 TABLET, FILM COATED ORAL at 09:46

## 2022-03-24 NOTE — PCM.HP
History of Present Illness





- Chief Complaint


Chief Complaint: Altered Mental Status, UTI


Date: 03/22/22


History of Present Illness: 


 is a 61 year old male.  Pt. was brought to ER for increased weakness

and decreased alertness, noted by family when EMS called.   Upon arrival to ER 

pt. evaluated and no specific etiology found, so patient admitted for possible 

infectious etiology and further evaluation.








- Review of Systems


Constitutional: Lethargy, No Fever, No Chills


Eyes: No Symptoms


Ears, Nose, & Throat: No Symptoms


Respiratory: No Cough, No Short Of Breath


Cardiac: No Chest Pain, No Edema, No Syncope


Abdominal/Gastrointestinal: No Abdominal Pain, No Nausea, No Vomiting, No 

Diarrhea


Genitourinary Symptoms: No Dysuria


Musculoskeletal: No Back Pain, No Neck Pain


Skin: No Rash


Neurological: No Dizziness, No Focal Weakness, No Sensory Changes


Psychological: No Symptoms


Endocrine: No Symptoms


Hematologic/Lymphatic: No Symptoms


Immunological/Allergic: No Symptoms





Medications & Allergies


Home Medications: 


                              Home Medication List





Atorvastatin Calcium [Lipitor] 80 mg PO DAILY 01/11/18 [History Confirmed 

03/22/22]


Tamsulosin HCl 0.4 mg*** [Flomax 0.4 MG***] 0.4 mg PO DAILY 01/11/18 [History 

Confirmed 03/22/22]


Baclofen 10 mg*** [Lioresal 10 mg***] 10 mg PO TIDPRN PRN 08/10/19 [History 

Confirmed 03/22/22]


Apixaban [Eliquis] 5 mg PO BID #60 tablet 06/07/21 [Rx Confirmed 03/22/22]


Aspirin EC 81 mg*** [Ecotrin 81 mg***] 81 mg PO DAILY #30 tablet 06/07/21 [Rx 

Confirmed 03/22/22]


Metoprolol Succinate 50 mg*** [Toprol Xl 50 MG***] 50 mg PO DAILY #30 tablet.sa 

06/07/21 [Rx Confirmed 03/22/22]


Cyanocobalamin (Vitamin B-12) [Vitamin B-12] 1,000 mcg PO DAILY 07/28/21 

[History Confirmed 03/22/22]


Duloxetine HCl 30 mg*** [Cymbalta 30 MG Capsule***] 30 mg PO DAILY 07/28/21 

[History Confirmed 03/22/22]


Gemfibrozil 600 mg*** [Lopid 600 mg***] 600 mg PO BID 03/22/22 [History 

Confirmed 03/22/22]


Mirtazapine 7.5 mg PO DAILY 03/22/22 [History Confirmed 03/22/22]


Omeprazole 20 mg PO DAILY 03/22/22 [History Confirmed 03/22/22]


PANTOPRAZOLE 40 mg Tablet*** [Protonix 40MG Tablet***] 40 mg PO DAILY 03/22/22 

[History Confirmed 03/22/22]








Allergies/Adverse Reactions: 


                                    Allergies











Allergy/AdvReac Type Severity Reaction Status Date / Time


 


No Known Drug Allergies Allergy   Verified 03/21/22 22:14














- Past Medical History


Past Medical History: Yes


Neurological History: Stroke


ENT History: No Pertinent History


Cardiac History: High Cholesterol, Hypertension


Respiratory History: No Pertinent History


Endocrine Medical History: No Pertinent History


Musculoskelatal History: No Pertinent History


GI Medical History: Gallbladder Disease


 History: No Pertinent History


Pyscho-Social History: No Pertinent History


Male Reproductive Disorders: Prostate Problems


Comment: Kidney stones.  PATIENT POOR HISTORIAN.





- Past Surgical History


Past Surgical History: Yes


Neuro Surgical History: No Pertinent History


Cardiac History: No Pertinent History


Respiratory Surgery: No Pertinent History


GI Surgical History: No Pertinent History


Genitourinary Surgical Hx: No Pertinent History


Musculskeletal Surgical Hx: Orthopedic Surgery


Male Surgical History: No Pertinent History


Other Surgical History: Colonoscopy





- Social History


Smoking Status: Current every day smoker


How long have you smoked: 40 years


Exposure to second hand smoke: No (unknown)


Alcohol: Daily


Drug Use: marijuana





- Physical Exam


Vital Signs: 


                               Vital Signs - 24 hr











  Temp Pulse Resp BP BP Pulse Ox


 


 03/24/22 07:28  98.8 F  78  22  89/60  


 


 03/24/22 04:00  97.6 F  66  22  92/58   98


 


 03/24/22 00:00  98.0 F  66  20  95/66   99


 


 03/23/22 20:00  97.9 F  76  20   92/68  99


 


 03/23/22 15:43  99 F  88  18  88/62   95


 


 03/23/22 12:00  98.8 F  85  10 L  112/75   97











General Appearance: no apparent distress, alert


Neurologic Exam: alert, oriented x 3, cooperative, normal mood/affect, nml 

cerebellar function, nml station & gait, sensation nml, No motor deficits


Eye Exam: PERRL/EOMI, eyes nml inspection


Ears, Nose, Throat Exam: normal ENT inspection, TMs normal, pharynx normal, 

moist mucous membranes


Neck Exam: normal inspection, non-tender, supple, full range of motion


Respiratory Exam: normal breath sounds, lungs clear, No respiratory distress


Cardiovascular Exam: normal heart sounds, normal peripheral pulses, irregular 

(rate controlled)


Gastrointestinal/Abdomen Exam: soft, normal bowel sounds, No tenderness, No mass


Back Exam: normal inspection, normal range of motion, No CVA tenderness, No 

vertebral tenderness


Extremity Exam: pelvis stable, paralysis (left side paralysis from previous cva)


Skin Exam: normal color, warm, dry, No rash


Wound Assessment: 


                              Skin/Wound Assessment





Wound/Incision Assessment                                  Start:  03/21/22 

23:28


Text:                                                      Status: Active       




Freq:   Q6H                                                                     




Protocol:                                                                       




 Document     03/24/22 07:24  ROXANNE  (Rec: 03/24/22 07:24  ROXANNE  [id number])


 Wound/Incision Assessment


     Left Celestin


      Wound Assessment                           Shift Assessment


      Drainage Odor                              None/Absent


      General Appearance                         Open to air,Clean/Dry


      Wound Bed Greatest Portion                 Pale Pink


      Wound Bed Lesser Portion                   Pale Pink


      Surrounding Tissue                         Edematous


      Comment                                    leg appears yellow or jaundice


                                                 surrounding multiple small


                                                 ulcerated areas


     Anterior Right Knee


      Wound Assessment                           Shift Assessment


      Wound Type                                 scab


      Wound Stage                                Non Pressure Wound


      General Appearance                         Open to air,Clean/Dry


      Length (cm) (cm)                           3


      Width (cm) (cm)                            1.5


      Wound Bed Greatest Portion                 Black (Eschar)


      Wound Bed Lesser Portion                   Pale Pink


      Surrounding Tissue                         Pink


     Posterior Left Hand


      Wound Assessment                           Shift Assessment


      Wound Type                                 Skin Tear


      Wound Stage                                Non Pressure Wound


      Dressing Status                            Dry & Intact


      Drainage Amount                            None


      General Appearance                         Clean/Dry


      Wound Bed Greatest Portion                 Red (Granulation)


      Wound Bed Lesser Portion                   Red (Granulation)


      Surrounding Tissue                         Purple


      Comment                                    xeroform gauze, stretch gauze


     Right Elbow


      Wound Assessment                           Shift Assessment


      Wound Type                                 circular scab


      Drainage Amount                            None


      General Appearance                         Open to air,Clean/Dry


      Length (cm) (cm)                           1


      Width (cm) (cm)                            0.5


      Depth (cm) (cm)                            0.1


      Surrounding Tissue                         Purple


     Posterior Right Upper Arm


      Wound Assessment                           Shift Assessment


      Wound Type                                 Skin Tear


      Wound Stage                                Non Pressure Wound


      Dressing Status                            Dry & Intact


      Drainage Amount                            None


      General Appearance                         Clean/Dry


      Length (cm) (cm)                           5


      Width (cm) (cm)                            2.5


      Depth (cm) (cm)                            0


      Wound Bed Greatest Portion                 Pale Pink


      Wound Bed Lesser Portion                   Pale Pink


      Surrounding Tissue                         Purple


      Comment                                    xerophorm gauze and stretch


                                                 gauze


 Wound Photo


     Photo Taken                                 Yes


     Comment:                                    pictures taken upon admission








Lymphatic Exam: No adenopathy





Results





- Labs


Lab/Micro Results: 


                            Lab Results-Last 24 Hours











  03/24/22 03/24/22 03/24/22 Range/Units





  05:06 05:06 05:06 


 


WBC  4.2    (4.0-10.5)  K/mm3


 


RBC  3.00 L    (4.1-5.6)  M/mm3


 


Hgb  8.9 L    (12.5-18.0)  gm/dl


 


Hct  27.4 L    (42-50)  %


 


MCV  91.3    ()  fl


 


MCH  29.7    (26-32)  pg


 


MCHC  32.5    (32-36)  g/dl


 


RDW  16.9 H    (11.5-14.0)  %


 


Plt Count  201    (150-450)  K/mm3


 


MPV  10.2    (7.5-11.0)  fl


 


Sodium   130 L   (137-145)  mmol/L


 


Potassium   3.4 L   (3.5-5.1)  mmol/L


 


Chloride   104   ()  mmol/L


 


Carbon Dioxide   19 L   (22-30)  mmol/L


 


Anion Gap   10.5   (5-15)  MEQ/L


 


BUN   7 L   (9-20)  mg/dL


 


Creatinine   0.73   (0.66-1.25)  mg/dL


 


Estimated GFR   > 60.0   ML/MIN


 


Glucose   110 H   ()  mg/dL


 


Calcium   8.1 L   (8.4-10.2)  mg/dL


 


Iron    78  ()  ug/dL


 


TIBC    196 L  (261-497)  ug/dL


 


Iron Saturation    40 H  (20-39)  %


 


Total Bilirubin   1.50 H   (0.2-1.3)  mg/dL


 


AST   21   (17-59)  U/L


 


ALT   10   (0-50)  U/L


 


Alkaline Phosphatase   78   ()  U/L


 


Serum Total Protein   5.7 L   (6.3-8.2)  g/dL


 


Albumin   2.6 L   (3.5-5.0)  g/dL








                                  Microbiology











 03/21/22 16:35 Urine Culture - Final





 Clean Catch Midstream    Enterococcus Faecalis














- Radiology Impressions


Radiology Exams & Impressions: 


                              Radiology Procedures











 Category Date Time Status


 


 MRI BRAIN W/O CONTRAST [MRI] Routine Exams  03/22/22 09:48 Completed














Assessment/Plan


(1) UTI (urinary tract infection)


Current Visit: Yes   Status: Acute   


Assessment & Plan: 


IV antibiotics


Code(s): N39.0 - URINARY TRACT INFECTION, SITE NOT SPECIFIED   





(2) AMS (altered mental status)


Current Visit: Yes   Status: Acute   


Assessment & Plan: 


MRI of brain and EEG to rule out seizure/post-ictal episode


Code(s): R41.82 - ALTERED MENTAL STATUS, UNSPECIFIED   





(3) Atrial fibrillation


Current Visit: Yes   Status: Acute   


Assessment & Plan: 


continue beta blocker


Code(s): I48.91 - UNSPECIFIED ATRIAL FIBRILLATION   





(4) Acute kidney injury


Current Visit: Yes   Status: Acute   


Assessment & Plan: 


iv fluids


Code(s): N17.9 - ACUTE KIDNEY FAILURE, UNSPECIFIED

## 2022-03-24 NOTE — PCM.DS
Discharge Summary


Date of Admission: 


03/21/22 21:32





Date of Discharge: 





03/24/2022


Admitting Physician: 


ALVARO HERNANDEZ





Consults: 





                                Consults on Case





03/23/22 09:50


Consult Cardiology ROUTINE 











Primary Care Provider: 


ALVARO HERNANDEZ








Allergies


Allergies





No Known Drug Allergies Allergy (Verified 03/21/22 22:14)


   











Hospital Summary





- Hospital Course


Hospital Course: 


Pt. admitted for further evaulation of Mental status changes/lethargy.  Pt. was 

started on IV antibiotics.  Pt. mental status improved some the next day and by 

day 3 was back at baseline with urine culture results indicating infection.  pt.

with history of CVA agreed to NH discharge for strengthening and continued 

monitoring, and taking of po antibiotic based upon culture results.








- Vitals & Intake/Output


Vital Signs: 





                                   Vital Signs











Temperature  98.8 F   03/24/22 07:28


 


Pulse Rate  78   03/24/22 07:28


 


Respiratory Rate  22 03/24/22 07:28


 


Blood Pressure  89/60   03/24/22 07:28


 


O2 Sat by Pulse Oximetry  98   03/24/22 04:00











Intake & Output: 





                                 Intake & Output











 03/21/22 03/22/22 03/23/22 03/24/22





 11:59 11:59 11:59 11:59


 


Intake Total  1190 2723 4836


 


Output Total  900 1225 3050


 


Balance  290 1498 1786


 


Weight  92.4 kg 98 kg 98 kg














- Lab


Result Diagrams: 


                                 03/24/22 05:06





                                 03/24/22 05:06


Lab Results-Last 24 Hrs: 





                            Lab Results-Last 24 Hours











  03/24/22 03/24/22 03/24/22 Range/Units





  05:06 05:06 05:06 


 


WBC  4.2    (4.0-10.5)  K/mm3


 


RBC  3.00 L    (4.1-5.6)  M/mm3


 


Hgb  8.9 L    (12.5-18.0)  gm/dl


 


Hct  27.4 L    (42-50)  %


 


MCV  91.3    ()  fl


 


MCH  29.7    (26-32)  pg


 


MCHC  32.5    (32-36)  g/dl


 


RDW  16.9 H    (11.5-14.0)  %


 


Plt Count  201    (150-450)  K/mm3


 


MPV  10.2    (7.5-11.0)  fl


 


Sodium   130 L   (137-145)  mmol/L


 


Potassium   3.4 L   (3.5-5.1)  mmol/L


 


Chloride   104   ()  mmol/L


 


Carbon Dioxide   19 L   (22-30)  mmol/L


 


Anion Gap   10.5   (5-15)  MEQ/L


 


BUN   7 L   (9-20)  mg/dL


 


Creatinine   0.73   (0.66-1.25)  mg/dL


 


Estimated GFR   > 60.0   ML/MIN


 


Glucose   110 H   ()  mg/dL


 


Calcium   8.1 L   (8.4-10.2)  mg/dL


 


Iron    78  ()  ug/dL


 


TIBC    196 L  (261-497)  ug/dL


 


Iron Saturation    40 H  (20-39)  %


 


Total Bilirubin   1.50 H   (0.2-1.3)  mg/dL


 


AST   21   (17-59)  U/L


 


ALT   10   (0-50)  U/L


 


Alkaline Phosphatase   78   ()  U/L


 


Serum Total Protein   5.7 L   (6.3-8.2)  g/dL


 


Albumin   2.6 L   (3.5-5.0)  g/dL











Micro Results-Entire Visit: 





                                  Microbiology











 03/21/22 16:35 Urine Culture - Final





 Clean Catch Midstream    Enterococcus Faecalis














- Radiology Exams


Ordered Rad Exams-Entire Visit: 





                              Radiology Procedures











 Category Date Time Status


 


 MRI BRAIN W/O CONTRAST [MRI] Routine Exams  03/22/22 09:48 Completed














- Procedures and Test


Procedures and Tests throughout Hospitalization: 





                            Therapy Orders & Screens





03/21/22 22:43


Oxygen Nasal Cannula 2 lpm 


   Comment: 


   Diagnosis: Altered Mental Status





03/22/22 09:00


EEG 41-60 Minutes (Normal) ONCE 


   Comment: 


   Reason For Exam: 


   Diagnosis: Altered Mental Status





03/23/22 10:13


PT Eval & Treat (MD Order) ONCE 


   Reason for Eval:: WEAKNESS- NEEDING SNF PLACEMENT


   Diagnosis: Altered Mental Status














Discharge Exam


General Appearance: no apparent distress, alert


Neurologic Exam: alert, oriented x 3, cooperative, normal mood/affect, nml 

cerebellar function, sensation nml, No motor deficits


Eye Exam: PERRL, EOMI, eyes nml inspection


Ears, Nose, Throat Exam: normal ENT inspection, pharynx normal, moist mucous 

membranes, other (pressured speech, chronic for the patient)


Neck Exam: normal inspection, non-tender, supple, full range of motion


Respiratory Exam: normal breath sounds, lungs clear, No respiratory distress


Cardiovascular Exam: regular rate/rhythm, normal heart sounds


Gastrointestinal/Abdomen Exam: soft, No tenderness, No mass


Male Genitalia Exam: deferred


Rectal Exam: deferred


Back Exam: normal inspection, normal range of motion, No CVA tenderness, No 

vertebral tenderness


Extremity Exam: normal inspection, paralysis (of right side)


Skin Exam: normal color, warm, dry


Wound Assessment: 





                              Skin/Wound Assessment





Wound/Incision Assessment                                  Start:  03/21/22 

23:28


Text:                                                      Status: Active       

 


Freq:   Q6H                                                                     

 


Protocol:                                                                       

 


 Document     03/24/22 07:24  ROXANNE  (Rec: 03/24/22 07:24  ROXANNE  [id number])


 Wound/Incision Assessment


     Left Celestin


      Wound Assessment                           Shift Assessment


      Drainage Odor                              None/Absent


      General Appearance                         Open to air,Clean/Dry


      Wound Bed Greatest Portion                 Pale Pink


      Wound Bed Lesser Portion                   Pale Pink


      Surrounding Tissue                         Edematous


      Comment                                    leg appears yellow or jaundice


                                                 surrounding multiple small


                                                 ulcerated areas


     Anterior Right Knee


      Wound Assessment                           Shift Assessment


      Wound Type                                 scab


      Wound Stage                                Non Pressure Wound


      General Appearance                         Open to air,Clean/Dry


      Length (cm) (cm)                           3


      Width (cm) (cm)                            1.5


      Wound Bed Greatest Portion                 Black (Eschar)


      Wound Bed Lesser Portion                   Pale Pink


      Surrounding Tissue                         Pink


     Posterior Left Hand


      Wound Assessment                           Shift Assessment


      Wound Type                                 Skin Tear


      Wound Stage                                Non Pressure Wound


      Dressing Status                            Dry & Intact


      Drainage Amount                            None


      General Appearance                         Clean/Dry


      Wound Bed Greatest Portion                 Red (Granulation)


      Wound Bed Lesser Portion                   Red (Granulation)


      Surrounding Tissue                         Purple


      Comment                                    xeroform gauze, stretch gauze


     Right Elbow


      Wound Assessment                           Shift Assessment


      Wound Type                                 circular scab


      Drainage Amount                            None


      General Appearance                         Open to air,Clean/Dry


      Length (cm) (cm)                           1


      Width (cm) (cm)                            0.5


      Depth (cm) (cm)                            0.1


      Surrounding Tissue                         Purple


     Posterior Right Upper Arm


      Wound Assessment                           Shift Assessment


      Wound Type                                 Skin Tear


      Wound Stage                                Non Pressure Wound


      Dressing Status                            Dry & Intact


      Drainage Amount                            None


      General Appearance                         Clean/Dry


      Length (cm) (cm)                           5


      Width (cm) (cm)                            2.5


      Depth (cm) (cm)                            0


      Wound Bed Greatest Portion                 Pale Pink


      Wound Bed Lesser Portion                   Pale Pink


      Surrounding Tissue                         Purple


      Comment                                    xerophorm gauze and stretch


                                                 gauze


 Wound Photo


     Photo Taken                                 Yes


     Comment:                                    pictures taken upon admission











Final Diagnosis/Problem List





- Final Discharge Diagnosis/Problem


(1) UTI (urinary tract infection)


Current Visit: Yes   Status: Acute   


Assessment & Plan: 


d/c on po levoquin


Code(s): N39.0 - URINARY TRACT INFECTION, SITE NOT SPECIFIED   





(2) AMS (altered mental status)


Current Visit: Yes   Status: Acute   


Assessment & Plan: 


resolved with iv fluids and iv abx.  Pt. eating and drinking well, back to 

baseline


Code(s): R41.82 - ALTERED MENTAL STATUS, UNSPECIFIED   





(3) Atrial fibrillation


Current Visit: Yes   Status: Acute   Code(s): I48.91 - UNSPECIFIED ATRIAL 

FIBRILLATION   





(4) Acute kidney injury


Current Visit: Yes   Status: Acute   Code(s): N17.9 - ACUTE KIDNEY FAILURE, 

UNSPECIFIED   





- Discharge


Discharge Date: 03/24/22 (rehabilitation)


Disposition: DC TO [place]


Condition: Fair


Prescriptions: 


No Action


   Tamsulosin HCl 0.4 mg*** [Flomax 0.4 MG***] 0.4 mg PO DAILY


   Atorvastatin Calcium [Lipitor] 80 mg PO DAILY


   Baclofen 10 mg*** [Lioresal 10 mg***] 10 mg PO TIDPRN PRN


     PRN Reason: Muscle Spasms


   Aspirin EC 81 mg*** [Ecotrin 81 mg***] 81 mg PO DAILY #30 tablet


   Apixaban [Eliquis] 5 mg PO BID #60 tablet


   Metoprolol Succinate 50 mg*** [Toprol Xl 50 MG***] 50 mg PO DAILY #30 

tablet.sa


   Cyanocobalamin (Vitamin B-12) [Vitamin B-12] 1,000 mcg PO DAILY


   Duloxetine HCl 30 mg*** [Cymbalta 30 MG Capsule***] 30 mg PO DAILY


   Mirtazapine 7.5 mg PO DAILY


   Gemfibrozil 600 mg*** [Lopid 600 mg***] 600 mg PO BID


   Omeprazole 20 mg PO DAILY


   PANTOPRAZOLE 40 mg Tablet*** [Protonix 40MG Tablet***] 40 mg PO DAILY


Additional Instructions: 


CONTINUE PO LEVOQUIN FOR TOTAL OF 10 DAYS.


Follow up with: 


ALVARO HERNANDEZ MD [Primary Care Provider] - 


LAILA CHAVEZ NP [Family Provider] -

## 2022-03-25 RX ADMIN — MIRTAZAPINE SCH MG: 15 TABLET, FILM COATED ORAL at 12:04

## 2022-03-25 RX ADMIN — APIXABAN SCH MG: 2.5 TABLET, FILM COATED ORAL at 10:09

## 2022-03-25 RX ADMIN — METOPROLOL SUCCINATE SCH MG: 50 TABLET, EXTENDED RELEASE ORAL at 10:10

## 2022-03-25 RX ADMIN — APIXABAN SCH MG: 2.5 TABLET, FILM COATED ORAL at 21:38

## 2022-03-25 RX ADMIN — PANTOPRAZOLE SODIUM SCH MG: 40 TABLET, DELAYED RELEASE ORAL at 10:10

## 2022-03-25 RX ADMIN — LEVOFLOXACIN SCH MG: 500 TABLET, FILM COATED ORAL at 10:09

## 2022-03-25 RX ADMIN — SIMVASTATIN SCH MG: 20 TABLET, FILM COATED ORAL at 10:10

## 2022-03-25 RX ADMIN — TAMSULOSIN HYDROCHLORIDE SCH MG: 0.4 CAPSULE ORAL at 10:10

## 2022-03-25 RX ADMIN — DULOXETINE HYDROCHLORIDE SCH MG: 30 CAPSULE, DELAYED RELEASE ORAL at 10:09

## 2022-03-25 RX ADMIN — CYANOCOBALAMIN TAB 500 MCG SCH MCG: 500 TAB at 10:09

## 2022-03-26 RX ADMIN — APIXABAN SCH MG: 2.5 TABLET, FILM COATED ORAL at 21:33

## 2022-03-26 RX ADMIN — TAMSULOSIN HYDROCHLORIDE SCH MG: 0.4 CAPSULE ORAL at 09:06

## 2022-03-26 RX ADMIN — LEVOFLOXACIN SCH MG: 500 TABLET, FILM COATED ORAL at 09:06

## 2022-03-26 RX ADMIN — PANTOPRAZOLE SODIUM SCH MG: 40 TABLET, DELAYED RELEASE ORAL at 09:10

## 2022-03-26 RX ADMIN — METOPROLOL SUCCINATE SCH MG: 50 TABLET, EXTENDED RELEASE ORAL at 09:06

## 2022-03-26 RX ADMIN — DULOXETINE HYDROCHLORIDE SCH MG: 30 CAPSULE, DELAYED RELEASE ORAL at 09:06

## 2022-03-26 RX ADMIN — MIRTAZAPINE SCH MG: 15 TABLET, FILM COATED ORAL at 09:08

## 2022-03-26 RX ADMIN — SIMVASTATIN SCH MG: 20 TABLET, FILM COATED ORAL at 09:06

## 2022-03-26 RX ADMIN — APIXABAN SCH MG: 2.5 TABLET, FILM COATED ORAL at 09:06

## 2022-03-26 RX ADMIN — CYANOCOBALAMIN TAB 500 MCG SCH MCG: 500 TAB at 09:06

## 2022-03-27 RX ADMIN — CYANOCOBALAMIN TAB 500 MCG SCH MCG: 500 TAB at 10:37

## 2022-03-27 RX ADMIN — METOPROLOL SUCCINATE SCH MG: 50 TABLET, EXTENDED RELEASE ORAL at 10:38

## 2022-03-27 RX ADMIN — MIRTAZAPINE SCH MG: 15 TABLET, FILM COATED ORAL at 10:39

## 2022-03-27 RX ADMIN — PANTOPRAZOLE SODIUM SCH MG: 40 TABLET, DELAYED RELEASE ORAL at 10:36

## 2022-03-27 RX ADMIN — TAMSULOSIN HYDROCHLORIDE SCH MG: 0.4 CAPSULE ORAL at 10:34

## 2022-03-27 RX ADMIN — SIMVASTATIN SCH MG: 20 TABLET, FILM COATED ORAL at 10:34

## 2022-03-27 RX ADMIN — LEVOFLOXACIN SCH MG: 500 TABLET, FILM COATED ORAL at 10:37

## 2022-03-27 RX ADMIN — APIXABAN SCH MG: 2.5 TABLET, FILM COATED ORAL at 21:25

## 2022-03-27 RX ADMIN — APIXABAN SCH MG: 2.5 TABLET, FILM COATED ORAL at 10:37

## 2022-03-27 RX ADMIN — DULOXETINE HYDROCHLORIDE SCH MG: 30 CAPSULE, DELAYED RELEASE ORAL at 10:38

## 2022-03-28 VITALS — DIASTOLIC BLOOD PRESSURE: 79 MMHG | SYSTOLIC BLOOD PRESSURE: 101 MMHG | OXYGEN SATURATION: 96 % | HEART RATE: 92 BPM

## 2022-03-28 RX ADMIN — TAMSULOSIN HYDROCHLORIDE SCH MG: 0.4 CAPSULE ORAL at 09:08

## 2022-03-28 RX ADMIN — CYANOCOBALAMIN TAB 500 MCG SCH MCG: 500 TAB at 09:07

## 2022-03-28 RX ADMIN — METOPROLOL SUCCINATE SCH MG: 50 TABLET, EXTENDED RELEASE ORAL at 09:08

## 2022-03-28 RX ADMIN — APIXABAN SCH MG: 2.5 TABLET, FILM COATED ORAL at 09:07

## 2022-03-28 RX ADMIN — LEVOFLOXACIN SCH MG: 500 TABLET, FILM COATED ORAL at 09:07

## 2022-03-28 RX ADMIN — MIRTAZAPINE SCH MG: 15 TABLET, FILM COATED ORAL at 09:08

## 2022-03-28 RX ADMIN — PANTOPRAZOLE SODIUM SCH MG: 40 TABLET, DELAYED RELEASE ORAL at 09:08

## 2022-03-28 RX ADMIN — SIMVASTATIN SCH MG: 20 TABLET, FILM COATED ORAL at 09:07

## 2022-03-28 RX ADMIN — DULOXETINE HYDROCHLORIDE SCH MG: 30 CAPSULE, DELAYED RELEASE ORAL at 09:07

## 2022-03-28 NOTE — PCM.NOTE
Date and Time: 03/28/22  1309





Subjective Assessment: 





Pt has no complaints. Ate lunch fine.





- Review of Systems


Constitutional: No Fever


Abdominal/Gastrointestinal: No Vomiting





Objective Exam


General Appearance: no apparent distress, alert


Neurologic Exam: oriented x 3, cooperative


Skin Exam: warm, dry, No rash


Wound Assessment: 


                              Skin/Wound Assessment





Wound/Incision Assessment                                  Start:  03/21/22 

23:28


Text:                                                      Status: Active       




Freq:   Q6H                                                                     




Protocol:                                                                       




 Document     03/28/22 07:08  ROXANNE  (Rec: 03/28/22 07:08  ROXANNE  [id number])


 Wound/Incision Assessment


     Left Celestin


      Wound Assessment                           Shift Assessment


      Drainage Odor                              None/Absent


      General Appearance                         Open to air,Clean/Dry


      Wound Bed Greatest Portion                 Pale Pink


      Wound Bed Lesser Portion                   Pale Pink


      Surrounding Tissue                         Edematous


     Anterior Right Knee


      Wound Assessment                           Shift Assessment


      Wound Type                                 scab


      Wound Stage                                Non Pressure Wound


      General Appearance                         Open to air,Clean/Dry


      Length (cm) (cm)                           3


      Width (cm) (cm)                            1.5


      Wound Bed Greatest Portion                 Black (Eschar)


      Wound Bed Lesser Portion                   Pale Pink


      Surrounding Tissue                         Pink


     Posterior Left Hand


      Wound Assessment                           Shift Assessment


      Wound Type                                 Skin Tear


      Wound Stage                                Non Pressure Wound


      Drainage Amount                            None


      General Appearance                         Clean/Dry


      Wound Bed Greatest Portion                 Red (Granulation)


      Wound Bed Lesser Portion                   Red (Granulation)


      Surrounding Tissue                         Purple


     Right Elbow


      Wound Assessment                           Shift Assessment


      Wound Type                                 circular scab


      Drainage Amount                            None


      General Appearance                         Open to air,Clean/Dry


      Length (cm) (cm)                           1


      Width (cm) (cm)                            0.5


      Depth (cm) (cm)                            0.1


      Surrounding Tissue                         Purple


     Posterior Right Upper Arm


      Wound Assessment                           Shift Assessment


      Wound Type                                 Skin Tear


      Wound Stage                                Non Pressure Wound


      Drainage Amount                            None


      General Appearance                         Clean/Dry


      Length (cm) (cm)                           5


      Width (cm) (cm)                            2.5


      Depth (cm) (cm)                            0


      Wound Bed Greatest Portion                 Pale Pink


      Wound Bed Lesser Portion                   Pale Pink


      Surrounding Tissue                         Purple


 Wound Photo


     Photo Taken                                 No


     Comment:                                    pictures taken upon admission








Eye Exam: eyes nml inspection


Ears, Nose, Throat Exam: moist mucous membranes


Neck Exam: normal inspection


Respiratory Exam: normal breath sounds, lungs clear, No crackles/rales, No 

rhonchi, No wheezing


Cardiovascular Exam: regular rate/rhythm, normal heart sounds, No murmur


Gastrointestinal/Abdomen Exam: soft, normal bowel sounds, No tenderness, No 

distention, No mass, No guarding, No rebound


Extremity Exam: No pedal edema, No swelling


Back Exam: normal inspection, No CVA tenderness, No rash





OBJECTIVE DATA


Vital Signs: 


                               Vital Signs - 24 hr











  Temp Pulse Resp BP Pulse Ox


 


 03/28/22 11:51  98.1 F  92 H  16  101/79  96


 


 03/28/22 07:54  98.1 F  82  16  96/69  94 L


 


 03/28/22 04:00  97.5 F  80  20  78/52  100


 


 03/27/22 23:46  97.3 F  76  18  102/65  99


 


 03/27/22 19:17  97.3 F  78  18  90/65  98


 


 03/27/22 16:00  97.9 F  78  16  88/58  92 L








                        Pain Assessment - Last Documented











Pain Intensity                 0











Intake and Output: 


                                 Intake & Output











 03/26/22 03/27/22 03/28/22 03/29/22





 11:59 11:59 11:59 11:59


 


Intake Total 1456 1035 1320 


 


Balance 1456 1035 1320 


 


Weight 99.2 kg 100.4 kg 100.4 kg 











Multi-Disciplinary Progress Notes: 


                        Multi-Disciplinary Progress Notes





03/28/22 11:41 Physical Therapy Note by [id number]


PT. SEEN BY P.T. THIS A.M.  REPORTS C/O R SHOULDER PN D/T HEMIPLEGIC SHOULDER 

WHICH IS CHRONIC.  PT. IN BEDSIDE CHAIR UPON P.T. ARRIVAL TO ROOM.  NSG REPORTS 

BED MOBILITY IMPROVED.  PT. RECEIVED R LE AAROM HIP, KNEE, ANKLE W/ NOTED 

INCREASED EXTENSOR SPASTICITY THROUGHOUT R LE..  PT. PERFORMED L LE EX'S OF 

RECLINED HEELS SLIDES, ABD SLIDES, ANKLE PUMPS, SLRS.   PT. REPORTS HE DOES NOT 

HAVE AN AFO AT HOME, BUT DID AT ONE TIME.  PT. STATES HE HAD BEEN ABLE TO 

TRANSFER HIMSELF TO RECLINER AND POWER CHAIR AS WELL AS COMMODE UNTIL ~ 1 WK 

AGO.  REPORTS HE USED A SW TO ASSIST W/ MOBILITY DESPITE MIN-NO  IN R HAND. 

PT. AGREEABLE TO WORK ON STANDING W/ HEMIWALKER.  PT. ABLE TO SCOOT TO EDGE OF 

SEAT W/ CGA-SBA.  ATTEMPTED X 3 TO PERFORM SIT TO STAND W/ MAX ASSIST X 1  AND 

HAD TO ASK NSG STAFF FOR MORE ASSISTANCE.  PT. PERFORMED SIT TO STAND W/ MOD 

ASISST X 2 AND WAS ABLE TO GRASP JAMSHID-WALKER W/ L UE.  PERFORMED SOME LATERAL 

WEIGHT SHIFTS W/ HEMIWALKER W/ MIN-MOD ASSIST X 1.  ATTEMPTED TO STEP W/ USE OF 

JAMSHID-WALKER.  PT. STEPPED W/ R LE W/ MOD ASSIST X 2 AND MOD-MAX ASSIST TO 

ADVANCE JAMSHID-WALKER.  PT. THEN REPORTED HE NEEDED TO SIT BACK DOWN.  DIFFICUT TO

DETERMINE WHY D/T PT'S APHASIC/APRAXIC SPEECH.  PT. DID C/O SOME DIZZINESS AFTER

STANDING AND BP WAS 84/64 AFTER STANDING AND RETURN TO SITTING.  ENCOURAGED PT. 

TO REMAIN SITTING THROUGH LUNCH IF POSSIBLE. WILL CONT. P.T. 5X/WK TO INCREASE 

STRENGTH AND PROGRESS PT'S INDEPENDENCE W/ FUNCTIONAL MOBILITY.  





RX TIME 11:15-11:40





   ** Electronically signed by [id number], PT on 03/28/22 11:51 **


Initialized on 03/28/22 11:41 - END OF NOTE

















Assessment/Plan


(1) General weakness


Current Visit: No   Status: Acute   Code(s): R53.1 - WEAKNESS   





(2) AMS (altered mental status)


Current Visit: Yes   Status: Resolved   


Qualifiers: 


   Altered mental status type: delirium   Qualified Code(s): R41.0 - 

Disorientation, unspecified   


Code(s): R41.82 - ALTERED MENTAL STATUS, UNSPECIFIED   





(3) Acute kidney injury


Current Visit: Yes   Status: Resolved   Code(s): N17.9 - ACUTE KIDNEY FAILURE, 

UNSPECIFIED   





(4) UTI (urinary tract infection)


Current Visit: Yes   Status: Resolved   


Qualifiers: 


   Urinary tract infection type: acute cystitis   Hematuria presence: without 

hematuria   Qualified Code(s): N30.00 - Acute cystitis without hematuria   


Code(s): N39.0 - URINARY TRACT INFECTION, SITE NOT SPECIFIED   





(5) History of CVA (cerebrovascular accident)


Current Visit: No   Status: Chronic   


Assessment & Plan: 


with late effects, including R sided hemiparesis.  


Will discharge to [facility] when they are ready to accept pt.


Code(s): Z86.73 - PRSNL HX OF TIA (TIA), AND CEREB INFRC W/O RESID DEFICITS

## 2022-03-28 NOTE — PCM.DS
Discharge Summary


Date of Admission: 


03/21/22 21:32





Date of Discharge: 


03/24/22





Admitting Physician: 


ALVARO HERNANDEZ





Consults: 





                                Consults on Case





03/23/22 09:50


Consult Cardiology ROUTINE 











Primary Care Provider: 


ALVARO HERNANDEZ








Allergies


Allergies





No Known Drug Allergies Allergy (Verified 03/21/22 22:14)


   











Hospital Summary





- Hospital Course


Hospital Course: 





Pt is 60 yo male with hx CVA who had AMS/weakness and was admitted.  Found to 

have UTI and treated with improvement.  Had IRASEMA on admission but last labs were 

normal.  He was supposed to be discharged to [facility] but is not being discharged 

until today.  





CXR nonacute.  CT chest with non-occluding pulmonary emboli.  CT and MRI head wi

th old infarct, nothing acute. 





He is tolerating po.  Will 





- Vitals & Intake/Output


Vital Signs: 





                                   Vital Signs











Temperature  98.1 F   03/28/22 11:51


 


Pulse Rate  92 H  03/28/22 11:51


 


Respiratory Rate  16   03/28/22 11:51


 


Blood Pressure  101/79   03/28/22 11:51


 


O2 Sat by Pulse Oximetry  96   03/28/22 11:51











Intake & Output: 





                                 Intake & Output











 03/26/22 03/27/22 03/28/22 03/29/22





 11:59 11:59 11:59 11:59


 


Intake Total 1456 1035 1320 


 


Balance 1456 1035 1320 


 


Weight 99.2 kg 100.4 kg 100.4 kg 














- Lab


Result Diagrams: 


                                 03/24/22 05:06





                                 03/24/22 05:06


Micro Results-Entire Visit: 





                                  Microbiology











 03/21/22 16:35 Urine Culture - Final





 Clean Catch Midstream    Enterococcus Faecalis














- Procedures and Test


Procedures and Tests throughout Hospitalization: 





                            Therapy Orders & Screens





03/21/22 22:43


Oxygen Nasal Cannula 2 lpm 


   Comment: 


   Diagnosis: Altered Mental Status





03/22/22 09:00


EEG 41-60 Minutes (Normal) ONCE 


   Comment: 


   Reason For Exam: 


   Diagnosis: Altered Mental Status





03/23/22 10:13


PT Eval & Treat (MD Order) ONCE 


   Reason for Eval:: WEAKNESS- NEEDING SNF PLACEMENT


   Diagnosis: Altered Mental Status





03/28/22 09:10


OT Eval and Treat (MD Order) ONCE 


   Comment: 


   Consulting Provider: 


   Physician Instructions: 


   Reason For Exam: 


   Diagnosis: Altered Mental Status, UTI














Discharge Exam


General Appearance: no apparent distress, alert


Neurologic Exam: oriented x 3, cooperative, other (R arm contracture)


Eye Exam: eyes nml inspection


Ears, Nose, Throat Exam: moist mucous membranes


Neck Exam: normal inspection


Respiratory Exam: normal breath sounds, lungs clear, No crackles/rales, No 

rhonchi, No wheezing


Cardiovascular Exam: regular rate/rhythm, normal heart sounds, No murmur


Gastrointestinal/Abdomen Exam: soft, normal bowel sounds, No tenderness, No 

distention, No mass, No guarding, No rebound


Back Exam: normal inspection, No rash


Extremity Exam: No pedal edema, No swelling


Skin Exam: normal color, warm, dry, No rash


Wound Assessment: 





                              Skin/Wound Assessment





Wound/Incision Assessment                                  Start:  03/21/22 

23:28


Text:                                                      Status: Active       




Freq:   Q6H                                                                     




Protocol:                                                                       




 Document     03/28/22 13:44  ROXANNE  (Rec: 03/28/22 13:45  ROXANNE  [id number])


 Wound/Incision Assessment


     Left Celestin


      Wound Assessment                           Shift Assessment


      Drainage Odor                              None/Absent


      General Appearance                         Open to air,Clean/Dry


      Wound Bed Greatest Portion                 Pale Pink


      Wound Bed Lesser Portion                   Pale Pink


      Surrounding Tissue                         Edematous


     Anterior Right Knee


      Wound Assessment                           Shift Assessment


      Wound Type                                 scab


      Wound Stage                                Non Pressure Wound


      General Appearance                         Open to air,Clean/Dry


      Length (cm) (cm)                           3


      Width (cm) (cm)                            1.5


      Wound Bed Greatest Portion                 Black (Eschar)


      Wound Bed Lesser Portion                   Pale Pink


      Surrounding Tissue                         Pink


     Posterior Left Hand


      Wound Assessment                           Shift Assessment


      Wound Type                                 Skin Tear


      Wound Stage                                Non Pressure Wound


      Drainage Amount                            None


      General Appearance                         Clean/Dry


      Wound Bed Greatest Portion                 Red (Granulation)


      Wound Bed Lesser Portion                   Red (Granulation)


      Surrounding Tissue                         Purple


     Right Elbow


      Wound Assessment                           Shift Assessment


      Wound Type                                 circular scab


      Drainage Amount                            None


      General Appearance                         Open to air,Clean/Dry


      Length (cm) (cm)                           1


      Width (cm) (cm)                            0.5


      Depth (cm) (cm)                            0.1


      Surrounding Tissue                         Purple


     Posterior Right Upper Arm


      Wound Assessment                           Shift Assessment


      Wound Type                                 Skin Tear


      Wound Stage                                Non Pressure Wound


      Drainage Amount                            None


      General Appearance                         Clean/Dry


      Length (cm) (cm)                           5


      Width (cm) (cm)                            2.5


      Depth (cm) (cm)                            0


      Wound Bed Greatest Portion                 Pale Pink


      Wound Bed Lesser Portion                   Pale Pink


      Surrounding Tissue                         Purple


 Wound Photo


     Photo Taken                                 No


     Comment:                                    pictures taken upon admission











Final Diagnosis/Problem List





- Final Discharge Diagnosis/Problem


(1) General weakness


Current Visit: No   Status: Acute   


Assessment & Plan: 


to [facility] today.


Code(s): R53.1 - WEAKNESS   





(2) AMS (altered mental status)


Current Visit: Yes   Status: Resolved   Code(s): R41.82 - ALTERED MENTAL STATUS,

UNSPECIFIED   





(3) Acute kidney injury


Current Visit: Yes   Status: Resolved   Code(s): N17.9 - ACUTE KIDNEY FAILURE, 

UNSPECIFIED   





(4) UTI (urinary tract infection)


Current Visit: Yes   Status: Resolved   Code(s): N39.0 - URINARY TRACT 

INFECTION, SITE NOT SPECIFIED   





(5) History of CVA (cerebrovascular accident)


Current Visit: No   Status: Chronic   Code(s): Z86.73 - PRSNL HX OF TIA (TIA), 

AND CEREB INFRC W/O RESID DEFICITS   





(6) Atrial fibrillation


Current Visit: Yes   Status: Acute   Code(s): I48.91 - UNSPECIFIED ATRIAL 

FIBRILLATION   





(7) Pulmonary emboli


Current Visit: Yes   Status: Acute   


Assessment & Plan: 


stay on eliquis bid 5mg


Code(s): I26.99 - OTHER PULMONARY EMBOLISM WITHOUT ACUTE COR PULMONALE   





(8) Seizure


Current Visit: Yes   Status: Chronic   Code(s): R56.9 - UNSPECIFIED CONVULSIONS 

 





- Discharge


Disposition: DC TO [facility]


Condition: Fair


Prescriptions: 


New


   Levofloxacin*** [Levofloxacin 500 MG Tablet***] 500 mg PO DAILY #10 tablet





Continue


   Tamsulosin HCl 0.4 mg*** [Flomax 0.4 MG***] 0.4 mg PO DAILY


   Atorvastatin Calcium [Lipitor] 80 mg PO DAILY


   Baclofen 10 mg*** [Lioresal 10 mg***] 10 mg PO TIDPRN PRN


     PRN Reason: Muscle Spasms


   Aspirin EC 81 mg*** [Ecotrin 81 mg***] 81 mg PO DAILY #30 tablet


   Apixaban [Eliquis] 5 mg PO BID #60 tablet


   Metoprolol Succinate 50 mg*** [Toprol Xl 50 MG***] 50 mg PO DAILY #30 

tablet.sa


   Cyanocobalamin (Vitamin B-12) [Vitamin B-12] 1,000 mcg PO DAILY


   Duloxetine HCl 30 mg*** [Cymbalta 30 MG Capsule***] 30 mg PO DAILY


   Mirtazapine 7.5 mg PO DAILY


   Gemfibrozil 600 mg*** [Lopid 600 mg***] 600 mg PO BID


   Omeprazole 20 mg PO DAILY


   PANTOPRAZOLE 40 mg Tablet*** [Protonix 40MG Tablet***] 40 mg PO DAILY


Additional Instructions: 


NURSING HOME ORDERS:


ADMIT TO SKILLED NURSING FACILITY


REGULAR DIET


PT/OT EVAL AND TREAT


CONTINUE PO LEVOQUIN FOR TOTAL OF 10 DAYS.





Follow up with: 


SANDRA FRANCO [CONSULTING PHYSICIAN] - 03/29/22 3:15 pm (with Sue Jacobson/JENSEN)

## 2022-04-29 ENCOUNTER — HOSPITAL ENCOUNTER (OUTPATIENT)
Dept: HOSPITAL 33 - ED | Age: 62
Setting detail: OBSERVATION
LOS: 5 days | Discharge: NURSING, REHAB OR LONG-TERM CARE | End: 2022-05-04
Attending: FAMILY MEDICINE | Admitting: FAMILY MEDICINE
Payer: MEDICARE

## 2022-04-29 DIAGNOSIS — Z20.828: ICD-10-CM

## 2022-04-29 DIAGNOSIS — J18.9: Primary | ICD-10-CM

## 2022-04-29 DIAGNOSIS — Z79.899: ICD-10-CM

## 2022-04-29 DIAGNOSIS — R62.7: ICD-10-CM

## 2022-04-29 DIAGNOSIS — I69.351: ICD-10-CM

## 2022-04-29 DIAGNOSIS — I10: ICD-10-CM

## 2022-04-29 DIAGNOSIS — E78.00: ICD-10-CM

## 2022-04-29 DIAGNOSIS — I48.91: ICD-10-CM

## 2022-04-29 DIAGNOSIS — K30: ICD-10-CM

## 2022-04-29 DIAGNOSIS — N39.0: ICD-10-CM

## 2022-04-29 LAB
ALBUMIN SERPL-MCNC: 3.5 G/DL (ref 3.5–5)
ALP SERPL-CCNC: 102 U/L (ref 38–126)
ALT SERPL-CCNC: 12 U/L (ref 0–50)
AMPHETAMINES UR QL: NEGATIVE
ANION GAP SERPL CALC-SCNC: 13.6 MEQ/L (ref 5–15)
AST SERPL QL: 34 U/L (ref 17–59)
BARBITURATES UR QL: NEGATIVE
BASOPHILS # BLD AUTO: 0.04 10*3/UL (ref 0–0.4)
BENZODIAZ UR QL SCN: NEGATIVE
BILIRUB BLD-MCNC: 0.8 MG/DL (ref 0.2–1.3)
BNP SERPL-MCNC: 974 PG/ML (ref 0–900)
BUN SERPL-MCNC: 12 MG/DL (ref 9–20)
CALCIUM SPEC-MCNC: 9.5 MG/DL (ref 8.4–10.2)
CHLORIDE SERPL-SCNC: 106 MMOL/L (ref 98–107)
CO2 SERPL-SCNC: 22 MMOL/L (ref 22–30)
COCAINE UR QL SCN: NEGATIVE
CREAT SERPL-MCNC: 0.57 MG/DL (ref 0.66–1.25)
EOSINOPHIL # BLD AUTO: 0.26 10*3/UL (ref 0–0.5)
FLUAV AG NPH QL IA: NEGATIVE
FLUBV AG NPH QL IA: NEGATIVE
GFR SERPLBLD BASED ON 1.73 SQ M-ARVRAT: > 60 ML/MIN
GLUCOSE SERPL-MCNC: 117 MG/DL (ref 74–106)
GLUCOSE UR-MCNC: NEGATIVE MG/DL
HCT VFR BLD AUTO: 44.4 % (ref 42–50)
HGB BLD-MCNC: 14.6 GM/DL (ref 12.5–18)
LYMPHOCYTES # SPEC AUTO: 1.49 10*3/UL (ref 1–4.6)
MCH RBC QN AUTO: 29.9 PG (ref 26–32)
MCHC RBC AUTO-ENTMCNC: 32.9 G/DL (ref 32–36)
METHADONE UR QL: NEGATIVE
MONOCYTES # BLD AUTO: 0.66 10*3/UL (ref 0–1.3)
OPIATES UR QL: NEGATIVE
PCP UR QL CFM>20 NG/ML: NEGATIVE
PLATELET # BLD AUTO: 258 K/MM3 (ref 150–450)
POTASSIUM SERPLBLD-SCNC: 4.5 MMOL/L (ref 3.5–5.1)
PROT SERPL-MCNC: 6.9 G/DL (ref 6.3–8.2)
PROT UR STRIP-MCNC: 100 MG/DL
RBC # BLD AUTO: 4.88 M/MM3 (ref 4.1–5.6)
RBC # UR AUTO: (no result) ERY/UL (ref 0–5)
RBC #/AREA URNS HPF: >101 /HPF (ref 0–2)
RSV AG SPEC QL IA: NEGATIVE
SARS-COV-2 AG RESP QL IA.RAPID: NEGATIVE
SODIUM SERPL-SCNC: 137 MMOL/L (ref 137–145)
THC UR QL SCN: NEGATIVE
UA DIPSTICK PNL UR: (no result)
URINE CULTURED INDICATED?: YES
WBC # BLD AUTO: 5.4 K/MM3 (ref 4–10.5)

## 2022-04-29 PROCEDURE — 97110 THERAPEUTIC EXERCISES: CPT

## 2022-04-29 PROCEDURE — 93005 ELECTROCARDIOGRAM TRACING: CPT

## 2022-04-29 PROCEDURE — 36000 PLACE NEEDLE IN VEIN: CPT

## 2022-04-29 PROCEDURE — 83880 ASSAY OF NATRIURETIC PEPTIDE: CPT

## 2022-04-29 PROCEDURE — 81015 MICROSCOPIC EXAM OF URINE: CPT

## 2022-04-29 PROCEDURE — 83605 ASSAY OF LACTIC ACID: CPT

## 2022-04-29 PROCEDURE — 85025 COMPLETE CBC W/AUTO DIFF WBC: CPT

## 2022-04-29 PROCEDURE — 97530 THERAPEUTIC ACTIVITIES: CPT

## 2022-04-29 PROCEDURE — 36415 COLL VENOUS BLD VENIPUNCTURE: CPT

## 2022-04-29 PROCEDURE — 82947 ASSAY GLUCOSE BLOOD QUANT: CPT

## 2022-04-29 PROCEDURE — 97166 OT EVAL MOD COMPLEX 45 MIN: CPT

## 2022-04-29 PROCEDURE — 80053 COMPREHEN METABOLIC PANEL: CPT

## 2022-04-29 PROCEDURE — 0241U: CPT

## 2022-04-29 PROCEDURE — 80307 DRUG TEST PRSMV CHEM ANLYZR: CPT

## 2022-04-29 PROCEDURE — 71045 X-RAY EXAM CHEST 1 VIEW: CPT

## 2022-04-29 PROCEDURE — 87086 URINE CULTURE/COLONY COUNT: CPT

## 2022-04-29 PROCEDURE — G0378 HOSPITAL OBSERVATION PER HR: HCPCS

## 2022-04-29 PROCEDURE — 87040 BLOOD CULTURE FOR BACTERIA: CPT

## 2022-04-29 PROCEDURE — 84484 ASSAY OF TROPONIN QUANT: CPT

## 2022-04-29 PROCEDURE — 99285 EMERGENCY DEPT VISIT HI MDM: CPT

## 2022-04-29 PROCEDURE — 97161 PT EVAL LOW COMPLEX 20 MIN: CPT

## 2022-04-29 PROCEDURE — P9612 CATHETERIZE FOR URINE SPEC: HCPCS

## 2022-04-29 PROCEDURE — 84134 ASSAY OF PREALBUMIN: CPT

## 2022-04-29 RX ADMIN — GEMFIBROZIL SCH MG: 600 TABLET ORAL at 21:59

## 2022-04-29 RX ADMIN — TAMSULOSIN HYDROCHLORIDE SCH MG: 0.4 CAPSULE ORAL at 17:58

## 2022-04-29 RX ADMIN — METOPROLOL SUCCINATE SCH MG: 50 TABLET, EXTENDED RELEASE ORAL at 17:57

## 2022-04-29 RX ADMIN — CYANOCOBALAMIN TAB 500 MCG SCH MCG: 500 TAB at 17:57

## 2022-04-29 RX ADMIN — APIXABAN SCH MG: 2.5 TABLET, FILM COATED ORAL at 21:59

## 2022-04-29 RX ADMIN — ASPIRIN SCH MG: 81 TABLET, COATED ORAL at 17:57

## 2022-04-29 RX ADMIN — DULOXETINE HYDROCHLORIDE SCH MG: 30 CAPSULE, DELAYED RELEASE ORAL at 17:57

## 2022-04-29 RX ADMIN — PANTOPRAZOLE SODIUM SCH MG: 40 TABLET, DELAYED RELEASE ORAL at 17:58

## 2022-04-29 RX ADMIN — ATORVASTATIN CALCIUM SCH MG: 40 TABLET, FILM COATED ORAL at 17:58

## 2022-04-29 NOTE — XRAY
Indication: Lethargy.



Comparison: March 21, 2022.



Portable chest demonstrates new right lower lobe infiltrate/atelectasis with

moderate effusion.  Remaining heart and left lung unremarkable.  Bony thorax

intact again with mild osteopenia, degenerative changes, and old right lower

rib fractures.

## 2022-04-29 NOTE — ERPHSYRPT
- History of Present Illness


Source: patient, EMS


Exam Limitations: other (Poor historian)


Patient Subjective Stated Complaint: pt here after signing himself out of 

[facility] yesterday and returning to his home and is unable to care 

for himself. pt wishes to be at [facility] in [place]. pt lives 

alone and has home health care.


Triage Nursing Assessment: comes in via ambulance, pt is breathing easily on 

cot, pt A&O x4. pt smells strongly of urine and is saturated in urine. pt denies

CP, SOB, any new pain, or new onset of weakness. pt has had stroke in the past 

and has right sided weakness as a result. pt breath sounds clear, bowel sounds 

present x4.


Physician History: 





60 yo wm who left a NH in [place] yesterday against medical advice presents by 

EMS because he wants to go to [facility]. Pt is alert and oriented x3 and in 

NAD. Chest pain/fever/dyspnea/N/V/D/Melena/hematochezia are all denied. Pt has a

L hemiparesis from previous CVA. He is disheveled and smelling of urine.


Timing/Duration: yesterday


Modifying Factors: Improves With: nothing


Associated Symptoms: No nausea, No vomiting, No abdominal pain, No shortness of 

breath, No heartburn, No diaphoresis, No cough, No chills, No chest pain, No 

fever, No headaches, No loss of appetite, No malaise, No rash, No syncope, No 

seizure, No weakness


Allergies/Adverse Reactions: 








No Known Drug Allergies Allergy (Verified 04/29/22 12:30)


   





Home Medications: 








Atorvastatin Calcium [Lipitor] 80 mg PO DAILY 01/11/18 [History]


Tamsulosin HCl 0.4 mg*** [Flomax 0.4 MG***] 0.4 mg PO DAILY 01/11/18 [History]


Baclofen 10 mg*** [Lioresal 10 mg***] 10 mg PO TIDPRN PRN 08/10/19 [History]


Cyanocobalamin (Vitamin B-12) [Vitamin B-12] 1,000 mcg PO DAILY 07/28/21 

[History]


Duloxetine HCl 30 mg*** [Cymbalta 30 MG Capsule***] 30 mg PO DAILY 07/28/21 

[History]


Gemfibrozil 600 mg*** [Lopid 600 mg***] 600 mg PO BID 03/22/22 [History]


Mirtazapine 7.5 mg PO DAILY 03/22/22 [History]


Omeprazole 20 mg PO DAILY 03/22/22 [History]


PANTOPRAZOLE 40 mg Tablet*** [Protonix 40MG Tablet***] 40 mg PO DAILY 03/22/22 

[History]





Hx Tetanus, Diphtheria Vaccination/Date Given: No (unknown)


Hx Influenza Vaccination/Date Given: No (unknown)


Hx Pneumococcal Vaccination/Date Given: No (unknown)





Travel Risk





- International Travel


Have you traveled outside of the country in past 3 weeks: No





- Coronavirus Screening


Are you exhibiting any of the following symptoms?: No


Close contact with a COVID-19 positive Pt in past 14-21 Days: No





- Vaccine Status


Have you recieved a Covid-19 vaccination: Yes (unknown)


: Unknown





- Vaccination Dates


Date of 2cond Vaccination (if applicable): April 2022


Dates if Unknown: unknown





- Review of Systems


Constitutional: No Symptoms


Eyes: No Symptoms


Ears, Nose, & Throat: No Symptoms


Respiratory: No Symptoms


Cardiac: No Symptoms


Abdominal/Gastrointestinal: No Symptoms


Genitourinary Symptoms: No Symptoms


Musculoskeletal: No Symptoms


Skin: No Symptoms


Neurological: No Symptoms


Psychological: No Symptoms


Endocrine: No Symptoms


Hematologic/Lymphatic: No Symptoms


Immunological/Allergic: No Symptoms





- Past Medical History


Pertinent Past Medical History: Yes


Neurological History: Stroke


ENT History: No Pertinent History


Cardiac History: High Cholesterol, Hypertension


Respiratory History: No Pertinent History


Endocrine Medical History: No Pertinent History


Musculoskeletal History: No Pertinent History


GI Medical History: Gallbladder Disease


 History: No Pertinent History


Psycho-Social History: No Pertinent History


Male Reproductive Disorders: Prostate Problems


Other Medical History: Kidney stones.  PATIENT POOR HISTORIAN.





- Past Surgical History


Past Surgical History: Yes


Neuro Surgical History: No Pertinent History


Cardiac: No Pertinent History


Respiratory: No Pertinent History


Gastrointestinal: No Pertinent History


Genitourinary: No Pertinent History


Musculoskeletal: Orthopedic Surgery


Male Surgical History: No Pertinent History


Other Surgical History: Colonoscopy





- Social History


Smoking Status: Current every day smoker


How long have you smoked: 40 years


Exposure to second hand smoke: No (unknown)


Drug Use: none


Patient Lives Alone: Yes (yes? with home health care)





- Nursing Vital Signs


Nursing Vital Signs: 


                               Initial Vital Signs











Temperature  96.3 F   04/29/22 12:10


 


Pulse Rate  100 H  04/29/22 12:10


 


Respiratory Rate  24   04/29/22 12:10


 


Blood Pressure  112/77   04/29/22 12:10


 


O2 Sat by Pulse Oximetry  96   04/29/22 12:10








                                   Pain Scale











Pain Intensity                 2











Tachycardia





- Physical Exam


General Appearance: no apparent distress (Disheveled)


Eye Exam: PERRL/EOMI, eyes nml inspection


Ears, Nose, Throat Exam: normal ENT inspection, TMs normal, pharynx normal, 

moist mucous membranes


Neck Exam: normal inspection, non-tender, supple, full range of motion, No 

meningismus, No mass, No Brudzinski, No Kernig's, No carotid bruit


Respiratory Exam: normal breath sounds, lungs clear, airway intact


Cardiovascular Exam: regular rate/rhythm, normal heart sounds, normal peripheral

 pulses, capillary refill <2 sec, No murmur


Gastrointestinal/Abdomen Exam: soft, normal bowel sounds, No tenderness


Back Exam: normal inspection, normal range of motion


Extremity Exam: normal inspection, pelvis stable


Neurologic Exam: alert, oriented x 3, cooperative, CNs II-XII nml as tested, 

motor deficits (Chronic R hemiparesis), other (Flat affect), No disoriented, No 

confusion, No facial droop, No slurred speech


Skin Exam: normal color, warm, dry


Lymphatic Exam: No adenopathy


**SpO2 Interpretation**: normal


SpO2: 96


O2 Delivery: Room Air





- Course


Nursing assessment & vital signs reviewed: Yes


EKG Interpreted by Me: RATE (Afib/R95/Normal QT-QTc/No acute ST segment 

changes/Low voltage Inferior leads)


Ordered Tests: 


                               Active Orders 24 hr











 Category Date Time Status


 


 Bedrest ROUTINE Activity  04/29/22 15:51 Ordered


 


 Code Status Order ROUTINE Care  04/29/22 15:50 Ordered


 


 EKG-ER Only STAT Care  04/29/22 12:19 Active


 


 IV Care Q6H Care  04/29/22 15:50 Ordered


 


 IV Insertion STAT Care  04/29/22 14:43 Active


 


 Intake and Output Q12H Care  04/29/22 15:50 Ordered


 


 Vital Signs Q4H Care  04/29/22 15:50 Ordered


 


 cath [Cath for Specimen-Straight] STAT Care  04/29/22 13:28 Active


 


 Heart-Healthy  Diet Diet  04/29/22 Dinner Ordered


 


 CHEST 1 VIEW (PORTABLE) Stat Exams  04/29/22 12:21 Completed


 


 BLOOD CULTURE Stat Lab  04/29/22 14:42 Received


 


 CBC W DIFF AM.LAB Lab  04/30/22 04:00 Ordered


 


 CBC W DIFF Stat Lab  04/29/22 12:39 Completed


 


 CMP AM.LAB Lab  04/30/22 04:00 Ordered


 


 CMP Stat Lab  04/29/22 12:39 Completed


 


 CULTURE,URINE Stat Lab  04/29/22 13:28 Received


 


 Lactic Acid Stat Lab  04/29/22 15:00 Completed


 


 NT PRO BNP Stat Lab  04/29/22 12:39 Completed


 


 TROPONIN Q3H Lab  04/29/22 12:39 Completed


 


 TROPONIN Q3H Lab  04/29/22 13:39 Received


 


 TROPONIN Q3H Lab  04/29/22 18:30 Ordered


 


 TROPONIN Q3H Lab  04/29/22 21:30 Ordered


 


 TROPONIN Q3H Lab  04/30/22 00:30 Ordered


 


 UA W/RFX CULTURE Stat Lab  04/29/22 13:28 Completed


 


 Urine Triage Profile Stat Lab  04/29/22 14:00 Completed


 


 Transfer Order Routine Transfer  04/29/22 Ordered








Medication Summary











Generic Name Dose Route Start Last Admin





  Trade Name Freq  PRN Reason Stop Dose Admin


 


Sodium Chloride  1,000 mls @ 100 mls/hr  04/29/22 16:00 





  Sodium Chloride 0.9% 1000 Ml  IV  05/29/22 15:59 





  .Q10H ROSA  


 


Levofloxacin/Dextrose  750 mg in 150 mls @ 100 mls/hr  04/30/22 10:00 





  Levofloxacin 750mg/150ml D5w  IV  05/30/22 09:59 





  Q24H10 ROSA  


 


Ondansetron HCl  4 mg  04/29/22 15:50 





  Ondansetron Hcl 4 Mg/2 Ml Vial  IV  05/29/22 15:49 





  Q6H PRN PRN  





  NAUSEA/VOMITING  


 


Pantoprazole Sodium  40 mg  04/30/22 10:00 





  Pantoprazole 40 Mg Vial  IV  05/30/22 09:59 





  Q24H10 ROSA  














Discontinued Medications














Generic Name Dose Route Start Last Admin





  Trade Name Freq  PRN Reason Stop Dose Admin


 


Levofloxacin/Dextrose  750 mg in 150 mls @ 100 mls/hr  04/29/22 14:23  04/29/22 

14:41





  Levofloxacin 750mg/150ml D5w  IV  04/29/22 15:52  100 ml/hr





  STAT STA   100 mls/hr





    Administration


 


Levofloxacin/Dextrose  Confirm  04/29/22 14:40 





  Levofloxacin 750mg/150ml D5w  Administered  04/29/22 14:41 





  Dose  





  750 mg in 150 mls @ ud  





  IV  





  .STK-MED ONE  











Lab/Rad Data: 


                           Laboratory Result Diagrams





                                 04/29/22 12:39 





                                 04/29/22 12:39 





                               Laboratory Results











  04/29/22 04/29/22 04/29/22 Range/Units





  15:00 14:00 13:28 


 


WBC     (4.0-10.5)  K/mm3


 


RBC     (4.1-5.6)  M/mm3


 


Hgb     (12.5-18.0)  gm/dl


 


Hct     (42-50)  %


 


MCV     ()  fl


 


MCH     (26-32)  pg


 


MCHC     (32-36)  g/dl


 


RDW     (11.5-14.0)  %


 


Plt Count     (150-450)  K/mm3


 


MPV     (7.5-11.0)  fl


 


Gran %     (36.0-66.0)  %


 


Eos # (Auto)     (0-0.5)  


 


Absolute Lymphs (auto)     (1.0-4.6)  


 


Absolute Monos (auto)     (0.0-1.3)  


 


Lymphocytes %     (24.0-44.0)  %


 


Monocytes %     (0.0-12.0)  %


 


Eosinophils %     (0.00-5.0)  %


 


Basophils %     (0.0-0.4)  %


 


Absolute Granulocytes     (1.4-6.9)  


 


Basophils #     (0-0.4)  


 


Sodium     (137-145)  mmol/L


 


Potassium     (3.5-5.1)  mmol/L


 


Chloride     ()  mmol/L


 


Carbon Dioxide     (22-30)  mmol/L


 


Anion Gap     (5-15)  MEQ/L


 


BUN     (9-20)  mg/dL


 


Creatinine     (0.66-1.25)  mg/dL


 


Estimated GFR     ML/MIN


 


Glucose     ()  mg/dL


 


Lactic Acid  1.5    (0.4-2.0)  


 


Calcium     (8.4-10.2)  mg/dL


 


Total Bilirubin     (0.2-1.3)  mg/dL


 


AST     (17-59)  U/L


 


ALT     (0-50)  U/L


 


Alkaline Phosphatase     ()  U/L


 


Troponin I     (0.000-0.034)  ng/mL


 


NT-Pro-B Natriuret Pep     (0-900)  pg/mL


 


Serum Total Protein     (6.3-8.2)  g/dL


 


Albumin     (3.5-5.0)  g/dL


 


Urinalys Dipstick Clnc    MAIN LAB  


 


Urine Color    YELLOW  (YELLOW)  


 


Urine Appearance    CLEAR  (CLEAR)  


 


Urine pH    6.5  (5-6)  


 


Ur Specific Gravity    6.5  (1.005-1.025)  


 


POC Urine Protein Conf    100  (Negative)  


 


Urine Ketones    NEGATIVE  (NEGATIVE)  


 


Urine Nitrite    NEGATIVE  (NEGATIVE)  


 


Urine Bilirubin    NEGATIVE  (NEGATIVE)  


 


Urine Urobilinogen    0.2  (0-1)  mg/dL


 


Urine Leukocytes    NEGATIVE  (NEGATIVE)  


 


Urine WBC (Auto)    16-25  (0-5)  /HPF


 


Urine RBC (Auto)    >101  (0-2)  /HPF


 


U Epithel Cells (Auto)    NONE  (FEW)  /HPF


 


Urine Bacteria (Auto)    RARE  (NEGATIVE)  /HPF


 


Urine RBC    LARGE  (0-5)  Blake/ul


 


Ur Culture Indicated?    YES  


 


Urine Glucose    NEGATIVE  (NEGATIVE)  mg/dL


 


Urine Opiates Level   NEGATIVE   (NEGATIVE)  


 


Ur Methadone   NEGATIVE   (NEGATIVE)  


 


Urine Barbiturates   NEGATIVE   (NEGATIVE)  


 


Ur Phencyclidine (PCP)   NEGATIVE   (NEGATIVE)  


 


Urine Amphetamine   NEGATIVE   (NEGATIVE)  


 


U Benzodiazepine Level   NEGATIVE   (NEGATIVE)  


 


Urine Cocaine   NEGATIVE   (NEGATIVE)  


 


Urine Marijuana (THC)   NEGATIVE   (NEGATIVE)  


 


Influenza Type A Ag     (NEGATIVE)  


 


Influenza Type B Ag     (NEGATIVE)  


 


RSV (PCR)     (Negative)  


 


SARS-CoV-2 (PCR)     (NEGATIVE)  














  04/29/22 04/29/22 04/29/22 Range/Units





  12:39 12:39 12:39 


 


WBC     (4.0-10.5)  K/mm3


 


RBC     (4.1-5.6)  M/mm3


 


Hgb     (12.5-18.0)  gm/dl


 


Hct     (42-50)  %


 


MCV     ()  fl


 


MCH     (26-32)  pg


 


MCHC     (32-36)  g/dl


 


RDW     (11.5-14.0)  %


 


Plt Count     (150-450)  K/mm3


 


MPV     (7.5-11.0)  fl


 


Gran %     (36.0-66.0)  %


 


Eos # (Auto)     (0-0.5)  


 


Absolute Lymphs (auto)     (1.0-4.6)  


 


Absolute Monos (auto)     (0.0-1.3)  


 


Lymphocytes %     (24.0-44.0)  %


 


Monocytes %     (0.0-12.0)  %


 


Eosinophils %     (0.00-5.0)  %


 


Basophils %     (0.0-0.4)  %


 


Absolute Granulocytes     (1.4-6.9)  


 


Basophils #     (0-0.4)  


 


Sodium    137  (137-145)  mmol/L


 


Potassium    4.5  (3.5-5.1)  mmol/L


 


Chloride    106  ()  mmol/L


 


Carbon Dioxide    22  (22-30)  mmol/L


 


Anion Gap    13.6  (5-15)  MEQ/L


 


BUN    12  (9-20)  mg/dL


 


Creatinine    0.57 L  (0.66-1.25)  mg/dL


 


Estimated GFR    > 60.0  ML/MIN


 


Glucose    117 H  ()  mg/dL


 


Lactic Acid     (0.4-2.0)  


 


Calcium    9.5  (8.4-10.2)  mg/dL


 


Total Bilirubin    0.80  (0.2-1.3)  mg/dL


 


AST    34  (17-59)  U/L


 


ALT    12  (0-50)  U/L


 


Alkaline Phosphatase    102  ()  U/L


 


Troponin I   < 0.012   (0.000-0.034)  ng/mL


 


NT-Pro-B Natriuret Pep    974 H  (0-900)  pg/mL


 


Serum Total Protein    6.9  (6.3-8.2)  g/dL


 


Albumin    3.5  (3.5-5.0)  g/dL


 


Urinalys Dipstick Clnc     


 


Urine Color     (YELLOW)  


 


Urine Appearance     (CLEAR)  


 


Urine pH     (5-6)  


 


Ur Specific Gravity     (1.005-1.025)  


 


POC Urine Protein Conf     (Negative)  


 


Urine Ketones     (NEGATIVE)  


 


Urine Nitrite     (NEGATIVE)  


 


Urine Bilirubin     (NEGATIVE)  


 


Urine Urobilinogen     (0-1)  mg/dL


 


Urine Leukocytes     (NEGATIVE)  


 


Urine WBC (Auto)     (0-5)  /HPF


 


Urine RBC (Auto)     (0-2)  /HPF


 


U Epithel Cells (Auto)     (FEW)  /HPF


 


Urine Bacteria (Auto)     (NEGATIVE)  /HPF


 


Urine RBC     (0-5)  Blake/ul


 


Ur Culture Indicated?     


 


Urine Glucose     (NEGATIVE)  mg/dL


 


Urine Opiates Level     (NEGATIVE)  


 


Ur Methadone     (NEGATIVE)  


 


Urine Barbiturates     (NEGATIVE)  


 


Ur Phencyclidine (PCP)     (NEGATIVE)  


 


Urine Amphetamine     (NEGATIVE)  


 


U Benzodiazepine Level     (NEGATIVE)  


 


Urine Cocaine     (NEGATIVE)  


 


Urine Marijuana (THC)     (NEGATIVE)  


 


Influenza Type A Ag  NEGATIVE    (NEGATIVE)  


 


Influenza Type B Ag  NEGATIVE    (NEGATIVE)  


 


RSV (PCR)  NEGATIVE    (Negative)  


 


SARS-CoV-2 (PCR)  NEGATIVE    (NEGATIVE)  














  04/29/22 Range/Units





  12:39 


 


WBC  5.4  (4.0-10.5)  K/mm3


 


RBC  4.88  (4.1-5.6)  M/mm3


 


Hgb  14.6  (12.5-18.0)  gm/dl


 


Hct  44.4  (42-50)  %


 


MCV  91.0  ()  fl


 


MCH  29.9  (26-32)  pg


 


MCHC  32.9  (32-36)  g/dl


 


RDW  16.6 H  (11.5-14.0)  %


 


Plt Count  258  (150-450)  K/mm3


 


MPV  9.8  (7.5-11.0)  fl


 


Gran %  54.8  (36.0-66.0)  %


 


Eos # (Auto)  0.26  (0-0.5)  


 


Absolute Lymphs (auto)  1.49  (1.0-4.6)  


 


Absolute Monos (auto)  0.66  (0.0-1.3)  


 


Lymphocytes %  27.5  (24.0-44.0)  %


 


Monocytes %  12.2 H  (0.0-12.0)  %


 


Eosinophils %  4.8  (0.00-5.0)  %


 


Basophils %  0.7  (0.0-0.4)  %


 


Absolute Granulocytes  2.97  (1.4-6.9)  


 


Basophils #  0.04  (0-0.4)  


 


Sodium   (137-145)  mmol/L


 


Potassium   (3.5-5.1)  mmol/L


 


Chloride   ()  mmol/L


 


Carbon Dioxide   (22-30)  mmol/L


 


Anion Gap   (5-15)  MEQ/L


 


BUN   (9-20)  mg/dL


 


Creatinine   (0.66-1.25)  mg/dL


 


Estimated GFR   ML/MIN


 


Glucose   ()  mg/dL


 


Lactic Acid   (0.4-2.0)  


 


Calcium   (8.4-10.2)  mg/dL


 


Total Bilirubin   (0.2-1.3)  mg/dL


 


AST   (17-59)  U/L


 


ALT   (0-50)  U/L


 


Alkaline Phosphatase   ()  U/L


 


Troponin I   (0.000-0.034)  ng/mL


 


NT-Pro-B Natriuret Pep   (0-900)  pg/mL


 


Serum Total Protein   (6.3-8.2)  g/dL


 


Albumin   (3.5-5.0)  g/dL


 


Urinalys Dipstick Clnc   


 


Urine Color   (YELLOW)  


 


Urine Appearance   (CLEAR)  


 


Urine pH   (5-6)  


 


Ur Specific Gravity   (1.005-1.025)  


 


POC Urine Protein Conf   (Negative)  


 


Urine Ketones   (NEGATIVE)  


 


Urine Nitrite   (NEGATIVE)  


 


Urine Bilirubin   (NEGATIVE)  


 


Urine Urobilinogen   (0-1)  mg/dL


 


Urine Leukocytes   (NEGATIVE)  


 


Urine WBC (Auto)   (0-5)  /HPF


 


Urine RBC (Auto)   (0-2)  /HPF


 


U Epithel Cells (Auto)   (FEW)  /HPF


 


Urine Bacteria (Auto)   (NEGATIVE)  /HPF


 


Urine RBC   (0-5)  Blake/ul


 


Ur Culture Indicated?   


 


Urine Glucose   (NEGATIVE)  mg/dL


 


Urine Opiates Level   (NEGATIVE)  


 


Ur Methadone   (NEGATIVE)  


 


Urine Barbiturates   (NEGATIVE)  


 


Ur Phencyclidine (PCP)   (NEGATIVE)  


 


Urine Amphetamine   (NEGATIVE)  


 


U Benzodiazepine Level   (NEGATIVE)  


 


Urine Cocaine   (NEGATIVE)  


 


Urine Marijuana (THC)   (NEGATIVE)  


 


Influenza Type A Ag   (NEGATIVE)  


 


Influenza Type B Ag   (NEGATIVE)  


 


RSV (PCR)   (Negative)  


 


SARS-CoV-2 (PCR)   (NEGATIVE)  














- Progress


Progress: improved


Progress Note: 





04/29/22 15:54


Admit per Dr. Hernandez


04/29/22 15:55


Blood cultures x2


Levaquin 750mg IV


Counseled pt/family regarding: lab results, diagnosis, need for follow-up, rad 

results





- Departure


Departure Disposition: Observation


Clinical Impression: 


 Pneumonia, UTI (urinary tract infection), Failure to thrive in adult





Condition: Stable


Critical Care Time: No


Referrals: 


ALVARO HERNANDEZ MD [Primary Care Provider] - Follow up/PCP as directed

## 2022-04-30 LAB
ALBUMIN SERPL-MCNC: 3.2 G/DL (ref 3.5–5)
ALP SERPL-CCNC: 86 U/L (ref 38–126)
ALT SERPL-CCNC: 12 U/L (ref 0–50)
ANION GAP SERPL CALC-SCNC: 11.6 MEQ/L (ref 5–15)
AST SERPL QL: 27 U/L (ref 17–59)
BASOPHILS # BLD AUTO: 0.04 10*3/UL (ref 0–0.4)
BILIRUB BLD-MCNC: 0.6 MG/DL (ref 0.2–1.3)
BUN SERPL-MCNC: 15 MG/DL (ref 9–20)
CALCIUM SPEC-MCNC: 9.2 MG/DL (ref 8.4–10.2)
CHLORIDE SERPL-SCNC: 107 MMOL/L (ref 98–107)
CO2 SERPL-SCNC: 22 MMOL/L (ref 22–30)
CREAT SERPL-MCNC: 0.68 MG/DL (ref 0.66–1.25)
EOSINOPHIL # BLD AUTO: 0.27 10*3/UL (ref 0–0.5)
GFR SERPLBLD BASED ON 1.73 SQ M-ARVRAT: > 60 ML/MIN
GLUCOSE SERPL-MCNC: 120 MG/DL (ref 74–106)
HCT VFR BLD AUTO: 41.6 % (ref 42–50)
HGB BLD-MCNC: 13.6 GM/DL (ref 12.5–18)
LYMPHOCYTES # SPEC AUTO: 2.01 10*3/UL (ref 1–4.6)
MCH RBC QN AUTO: 29.9 PG (ref 26–32)
MCHC RBC AUTO-ENTMCNC: 32.7 G/DL (ref 32–36)
MONOCYTES # BLD AUTO: 0.8 10*3/UL (ref 0–1.3)
PLATELET # BLD AUTO: 240 K/MM3 (ref 150–450)
POTASSIUM SERPLBLD-SCNC: 4.1 MMOL/L (ref 3.5–5.1)
PROT SERPL-MCNC: 6.7 G/DL (ref 6.3–8.2)
RBC # BLD AUTO: 4.55 M/MM3 (ref 4.1–5.6)
SODIUM SERPL-SCNC: 136 MMOL/L (ref 137–145)
WBC # BLD AUTO: 5.3 K/MM3 (ref 4–10.5)

## 2022-04-30 RX ADMIN — GEMFIBROZIL SCH MG: 600 TABLET ORAL at 09:50

## 2022-04-30 RX ADMIN — GEMFIBROZIL SCH MG: 600 TABLET ORAL at 21:18

## 2022-04-30 RX ADMIN — ATORVASTATIN CALCIUM SCH MG: 40 TABLET, FILM COATED ORAL at 09:50

## 2022-04-30 RX ADMIN — MIRTAZAPINE SCH MG: 15 TABLET, FILM COATED ORAL at 09:49

## 2022-04-30 RX ADMIN — DULOXETINE HYDROCHLORIDE SCH MG: 30 CAPSULE, DELAYED RELEASE ORAL at 09:49

## 2022-04-30 RX ADMIN — PANTOPRAZOLE SODIUM SCH MG: 40 TABLET, DELAYED RELEASE ORAL at 09:49

## 2022-04-30 RX ADMIN — LEVOFLOXACIN SCH MLS/HR: 5 INJECTION, SOLUTION INTRAVENOUS at 09:44

## 2022-04-30 RX ADMIN — APIXABAN SCH MG: 2.5 TABLET, FILM COATED ORAL at 21:18

## 2022-04-30 RX ADMIN — CYANOCOBALAMIN TAB 500 MCG SCH MCG: 500 TAB at 09:49

## 2022-04-30 RX ADMIN — APIXABAN SCH MG: 2.5 TABLET, FILM COATED ORAL at 09:49

## 2022-04-30 RX ADMIN — METOPROLOL SUCCINATE SCH MG: 50 TABLET, EXTENDED RELEASE ORAL at 09:49

## 2022-04-30 RX ADMIN — ASPIRIN SCH MG: 81 TABLET, COATED ORAL at 09:49

## 2022-04-30 RX ADMIN — TAMSULOSIN HYDROCHLORIDE SCH MG: 0.4 CAPSULE ORAL at 09:49

## 2022-04-30 NOTE — PCM.HP
History of Present Illness





- Chief Complaint


Chief Complaint: PNE; UTI/Failure to thrive


History of Present Illness: 


 is a 61 year old male.who left a NH in [place] yesterday against 

medical advice presents by EMS because he wants to go to [facility]. Pt is 

alert and oriented x3 and in NAD. Chest 

pain/fever/dyspnea/N/V/D/Melena/hematochezia are all denied. Pt has a L 

hemiparesis from previous CVA. He is disheveled and smelling of urine.


Timing/Duration: yesterday


Modifying Factors: Improves With: nothing


Associated Symptoms: No nausea, No vomiting, No abdominal pain, No shortness of 

breath, No heartburn, No diaphoresis, No cough, No chills, No chest pain, No 

fever, No headaches, No loss of appetite, No malaise, No rash, No syncope, No 

seizure, No weakness








- Review of Systems


Constitutional: No Fever, No Chills


Eyes: No Symptoms


Ears, Nose, & Throat: No Symptoms


Respiratory: No Cough, No Short Of Breath


Cardiac: No Chest Pain, No Edema, No Syncope


Abdominal/Gastrointestinal: No Abdominal Pain, No Nausea, No Vomiting, No 

Diarrhea


Genitourinary Symptoms: No Dysuria


Musculoskeletal: No Back Pain, No Neck Pain


Skin: No Rash


Neurological: No Dizziness, No Focal Weakness, No Sensory Changes


Psychological: No Symptoms


Endocrine: No Symptoms


Hematologic/Lymphatic: No Symptoms


Immunological/Allergic: No Symptoms





Medications & Allergies


Home Medications: 


                              Home Medication List





Atorvastatin Calcium [Lipitor] 80 mg PO DAILY 01/11/18 [History Confirmed 

04/29/22]


Tamsulosin HCl 0.4 mg*** [Flomax 0.4 MG***] 0.4 mg PO DAILY 01/11/18 [History 

Confirmed 04/29/22]


Baclofen 10 mg*** [Lioresal 10 mg***] 10 mg PO TIDPRN PRN 08/10/19 [History 

Confirmed 04/29/22]


Apixaban [Eliquis] 5 mg PO BID #60 tablet 06/07/21 [Rx Confirmed 04/29/22]


Aspirin EC 81 mg*** [Ecotrin 81 mg***] 81 mg PO DAILY #30 tablet 06/07/21 [Rx 

Confirmed 04/29/22]


Metoprolol Succinate 50 mg*** [Toprol Xl 50 MG***] 50 mg PO DAILY #30 tablet.sa 

06/07/21 [Rx Confirmed 04/29/22]


Cyanocobalamin (Vitamin B-12) [Vitamin B-12] 1,000 mcg PO DAILY 07/28/21 

[History Confirmed 04/29/22]


Duloxetine HCl 30 mg*** [Cymbalta 30 MG Capsule***] 30 mg PO DAILY 07/28/21 

[History Confirmed 04/29/22]


Gemfibrozil 600 mg*** [Lopid 600 mg***] 600 mg PO BID 03/22/22 [History 

Confirmed 04/29/22]


Mirtazapine 7.5 mg PO DAILY 03/22/22 [History Confirmed 04/29/22]


Omeprazole 20 mg PO DAILY 03/22/22 [History Confirmed 04/29/22]








Allergies/Adverse Reactions: 


                                    Allergies











Allergy/AdvReac Type Severity Reaction Status Date / Time


 


No Known Drug Allergies Allergy   Verified 04/29/22 12:30














- Past Medical History


Past Medical History: Yes


Neurological History: Stroke


ENT History: No Pertinent History


Cardiac History: High Cholesterol, Hypertension


Respiratory History: No Pertinent History


Endocrine Medical History: No Pertinent History


Musculoskelatal History: No Pertinent History


GI Medical History: Gallbladder Disease


 History: No Pertinent History


Pyscho-Social History: No Pertinent History


Male Reproductive Disorders: Prostate Problems


Comment: Kidney stones.  PATIENT POOR HISTORIAN.





- Past Surgical History


Past Surgical History: Yes


Neuro Surgical History: No Pertinent History


Cardiac History: No Pertinent History


Respiratory Surgery: No Pertinent History


GI Surgical History: No Pertinent History


Genitourinary Surgical Hx: No Pertinent History


Musculskeletal Surgical Hx: Orthopedic Surgery


Male Surgical History: No Pertinent History


Other Surgical History: Colonoscopy





- Social History


Smoking Status: Former smoker


How long have you smoked: 40 years


Exposure to second hand smoke: No


Alcohol: Daily


Drug Use: none





- Physical Exam


Vital Signs: 


                               Vital Signs - 24 hr











  Temp Pulse Resp BP Pulse Ox


 


 04/30/22 08:00  98.2 F  80  18  113/74  98


 


 04/30/22 04:00  97.7 F  84  16  93/66  93 L


 


 04/30/22 00:00  97.7 F  102 H  16  98/60  95


 


 04/29/22 20:00  98.6 F  104 H  20  94/63  95


 


 04/29/22 16:49  97.1 F  110 H  18  102/62  98


 


 04/29/22 16:18  97.1 F  110 H  18  102/62  98


 


 04/29/22 15:55      96


 


 04/29/22 15:00   104 H  18  101/70  96


 


 04/29/22 14:00   96 H  18  102/87  98


 


 04/29/22 13:00   108 H  20  90/73  98


 


 04/29/22 12:10  96.3 F  100 H  24  112/77  96











General Appearance: no apparent distress, alert


Neurologic Exam: alert, oriented x 3, cooperative, normal mood/affect, nml 

cerebellar function, nml station & gait, sensation nml, No motor deficits


Eye Exam: PERRL/EOMI, eyes nml inspection


Ears, Nose, Throat Exam: normal ENT inspection, TMs normal, pharynx normal, 

moist mucous membranes


Neck Exam: normal inspection, non-tender, supple, full range of motion


Respiratory Exam: normal breath sounds, lungs clear, No respiratory distress


Cardiovascular Exam: regular rate/rhythm, normal heart sounds, normal peripheral

pulses


Gastrointestinal/Abdomen Exam: soft, normal bowel sounds, No tenderness, No mass


Back Exam: normal inspection, normal range of motion, No CVA tenderness, No 

vertebral tenderness


Extremity Exam: normal inspection, normal range of motion, pelvis stable


Skin Exam: normal color, warm, dry, No rash


Lymphatic Exam: No adenopathy





Results





- Labs


Lab/Micro Results: 


                            Lab Results-Last 24 Hours











  04/29/22 04/29/22 04/29/22 Range/Units





  12:39 12:39 12:39 


 


WBC  5.4    (4.0-10.5)  K/mm3


 


RBC  4.88    (4.1-5.6)  M/mm3


 


Hgb  14.6    (12.5-18.0)  gm/dl


 


Hct  44.4    (42-50)  %


 


MCV  91.0    ()  fl


 


MCH  29.9    (26-32)  pg


 


MCHC  32.9    (32-36)  g/dl


 


RDW  16.6 H    (11.5-14.0)  %


 


Plt Count  258    (150-450)  K/mm3


 


MPV  9.8    (7.5-11.0)  fl


 


Gran %  54.8    (36.0-66.0)  %


 


Eos # (Auto)  0.26    (0-0.5)  


 


Absolute Lymphs (auto)  1.49    (1.0-4.6)  


 


Absolute Monos (auto)  0.66    (0.0-1.3)  


 


Lymphocytes %  27.5    (24.0-44.0)  %


 


Monocytes %  12.2 H    (0.0-12.0)  %


 


Eosinophils %  4.8    (0.00-5.0)  %


 


Basophils %  0.7    (0.0-0.4)  %


 


Absolute Granulocytes  2.97    (1.4-6.9)  


 


Basophils #  0.04    (0-0.4)  


 


Sodium   137   (137-145)  mmol/L


 


Potassium   4.5   (3.5-5.1)  mmol/L


 


Chloride   106   ()  mmol/L


 


Carbon Dioxide   22   (22-30)  mmol/L


 


Anion Gap   13.6   (5-15)  MEQ/L


 


BUN   12   (9-20)  mg/dL


 


Creatinine   0.57 L   (0.66-1.25)  mg/dL


 


Estimated GFR   > 60.0   ML/MIN


 


Glucose   117 H   ()  mg/dL


 


Lactic Acid     (0.4-2.0)  


 


Calcium   9.5   (8.4-10.2)  mg/dL


 


Total Bilirubin   0.80   (0.2-1.3)  mg/dL


 


AST   34   (17-59)  U/L


 


ALT   12   (0-50)  U/L


 


Alkaline Phosphatase   102   ()  U/L


 


Troponin I    < 0.012  (0.000-0.034)  ng/mL


 


NT-Pro-B Natriuret Pep   974 H   (0-900)  pg/mL


 


Serum Total Protein   6.9   (6.3-8.2)  g/dL


 


Albumin   3.5   (3.5-5.0)  g/dL


 


Prealbumin     (17.6-36.0)  mg/dL


 


Urinalys Dipstick Clnc     


 


Urine Color     (YELLOW)  


 


Urine Appearance     (CLEAR)  


 


Urine pH     (5-6)  


 


Ur Specific Gravity     (1.005-1.025)  


 


POC Urine Protein Conf     (Negative)  


 


Urine Ketones     (NEGATIVE)  


 


Urine Nitrite     (NEGATIVE)  


 


Urine Bilirubin     (NEGATIVE)  


 


Urine Urobilinogen     (0-1)  mg/dL


 


Urine Leukocytes     (NEGATIVE)  


 


Urine WBC (Auto)     (0-5)  /HPF


 


Urine RBC (Auto)     (0-2)  /HPF


 


U Epithel Cells (Auto)     (FEW)  /HPF


 


Urine Bacteria (Auto)     (NEGATIVE)  /HPF


 


Urine RBC     (0-5)  Blake/ul


 


Ur Culture Indicated?     


 


Urine Glucose     (NEGATIVE)  mg/dL


 


Urine Opiates Level     (NEGATIVE)  


 


Ur Methadone     (NEGATIVE)  


 


Urine Barbiturates     (NEGATIVE)  


 


Ur Phencyclidine (PCP)     (NEGATIVE)  


 


Urine Amphetamine     (NEGATIVE)  


 


U Benzodiazepine Level     (NEGATIVE)  


 


Urine Cocaine     (NEGATIVE)  


 


Urine Marijuana (THC)     (NEGATIVE)  


 


Influenza Type A Ag     (NEGATIVE)  


 


Influenza Type B Ag     (NEGATIVE)  


 


RSV (PCR)     (Negative)  


 


SARS-CoV-2 (PCR)     (NEGATIVE)  














  04/29/22 04/29/22 04/29/22 Range/Units





  12:39 13:28 13:39 


 


WBC     (4.0-10.5)  K/mm3


 


RBC     (4.1-5.6)  M/mm3


 


Hgb     (12.5-18.0)  gm/dl


 


Hct     (42-50)  %


 


MCV     ()  fl


 


MCH     (26-32)  pg


 


MCHC     (32-36)  g/dl


 


RDW     (11.5-14.0)  %


 


Plt Count     (150-450)  K/mm3


 


MPV     (7.5-11.0)  fl


 


Gran %     (36.0-66.0)  %


 


Eos # (Auto)     (0-0.5)  


 


Absolute Lymphs (auto)     (1.0-4.6)  


 


Absolute Monos (auto)     (0.0-1.3)  


 


Lymphocytes %     (24.0-44.0)  %


 


Monocytes %     (0.0-12.0)  %


 


Eosinophils %     (0.00-5.0)  %


 


Basophils %     (0.0-0.4)  %


 


Absolute Granulocytes     (1.4-6.9)  


 


Basophils #     (0-0.4)  


 


Sodium     (137-145)  mmol/L


 


Potassium     (3.5-5.1)  mmol/L


 


Chloride     ()  mmol/L


 


Carbon Dioxide     (22-30)  mmol/L


 


Anion Gap     (5-15)  MEQ/L


 


BUN     (9-20)  mg/dL


 


Creatinine     (0.66-1.25)  mg/dL


 


Estimated GFR     ML/MIN


 


Glucose     ()  mg/dL


 


Lactic Acid     (0.4-2.0)  


 


Calcium     (8.4-10.2)  mg/dL


 


Total Bilirubin     (0.2-1.3)  mg/dL


 


AST     (17-59)  U/L


 


ALT     (0-50)  U/L


 


Alkaline Phosphatase     ()  U/L


 


Troponin I    < 0.012  (0.000-0.034)  ng/mL


 


NT-Pro-B Natriuret Pep     (0-900)  pg/mL


 


Serum Total Protein     (6.3-8.2)  g/dL


 


Albumin     (3.5-5.0)  g/dL


 


Prealbumin     (17.6-36.0)  mg/dL


 


Urinalys Dipstick Clnc   MAIN LAB   


 


Urine Color   YELLOW   (YELLOW)  


 


Urine Appearance   CLEAR   (CLEAR)  


 


Urine pH   6.5   (5-6)  


 


Ur Specific Gravity   6.5   (1.005-1.025)  


 


POC Urine Protein Conf   100   (Negative)  


 


Urine Ketones   NEGATIVE   (NEGATIVE)  


 


Urine Nitrite   NEGATIVE   (NEGATIVE)  


 


Urine Bilirubin   NEGATIVE   (NEGATIVE)  


 


Urine Urobilinogen   0.2   (0-1)  mg/dL


 


Urine Leukocytes   NEGATIVE   (NEGATIVE)  


 


Urine WBC (Auto)   16-25   (0-5)  /HPF


 


Urine RBC (Auto)   >101   (0-2)  /HPF


 


U Epithel Cells (Auto)   NONE   (FEW)  /HPF


 


Urine Bacteria (Auto)   RARE   (NEGATIVE)  /HPF


 


Urine RBC   LARGE   (0-5)  Blake/ul


 


Ur Culture Indicated?   YES   


 


Urine Glucose   NEGATIVE   (NEGATIVE)  mg/dL


 


Urine Opiates Level     (NEGATIVE)  


 


Ur Methadone     (NEGATIVE)  


 


Urine Barbiturates     (NEGATIVE)  


 


Ur Phencyclidine (PCP)     (NEGATIVE)  


 


Urine Amphetamine     (NEGATIVE)  


 


U Benzodiazepine Level     (NEGATIVE)  


 


Urine Cocaine     (NEGATIVE)  


 


Urine Marijuana (THC)     (NEGATIVE)  


 


Influenza Type A Ag  NEGATIVE    (NEGATIVE)  


 


Influenza Type B Ag  NEGATIVE    (NEGATIVE)  


 


RSV (PCR)  NEGATIVE    (Negative)  


 


SARS-CoV-2 (PCR)  NEGATIVE    (NEGATIVE)  














  04/29/22 04/29/22 04/29/22 Range/Units





  14:00 15:00 17:21 


 


WBC     (4.0-10.5)  K/mm3


 


RBC     (4.1-5.6)  M/mm3


 


Hgb     (12.5-18.0)  gm/dl


 


Hct     (42-50)  %


 


MCV     ()  fl


 


MCH     (26-32)  pg


 


MCHC     (32-36)  g/dl


 


RDW     (11.5-14.0)  %


 


Plt Count     (150-450)  K/mm3


 


MPV     (7.5-11.0)  fl


 


Gran %     (36.0-66.0)  %


 


Eos # (Auto)     (0-0.5)  


 


Absolute Lymphs (auto)     (1.0-4.6)  


 


Absolute Monos (auto)     (0.0-1.3)  


 


Lymphocytes %     (24.0-44.0)  %


 


Monocytes %     (0.0-12.0)  %


 


Eosinophils %     (0.00-5.0)  %


 


Basophils %     (0.0-0.4)  %


 


Absolute Granulocytes     (1.4-6.9)  


 


Basophils #     (0-0.4)  


 


Sodium     (137-145)  mmol/L


 


Potassium     (3.5-5.1)  mmol/L


 


Chloride     ()  mmol/L


 


Carbon Dioxide     (22-30)  mmol/L


 


Anion Gap     (5-15)  MEQ/L


 


BUN     (9-20)  mg/dL


 


Creatinine     (0.66-1.25)  mg/dL


 


Estimated GFR     ML/MIN


 


Glucose     ()  mg/dL


 


Lactic Acid   1.5   (0.4-2.0)  


 


Calcium     (8.4-10.2)  mg/dL


 


Total Bilirubin     (0.2-1.3)  mg/dL


 


AST     (17-59)  U/L


 


ALT     (0-50)  U/L


 


Alkaline Phosphatase     ()  U/L


 


Troponin I     (0.000-0.034)  ng/mL


 


NT-Pro-B Natriuret Pep     (0-900)  pg/mL


 


Serum Total Protein     (6.3-8.2)  g/dL


 


Albumin     (3.5-5.0)  g/dL


 


Prealbumin    12.13 L  (17.6-36.0)  mg/dL


 


Urinalys Dipstick Clnc     


 


Urine Color     (YELLOW)  


 


Urine Appearance     (CLEAR)  


 


Urine pH     (5-6)  


 


Ur Specific Gravity     (1.005-1.025)  


 


POC Urine Protein Conf     (Negative)  


 


Urine Ketones     (NEGATIVE)  


 


Urine Nitrite     (NEGATIVE)  


 


Urine Bilirubin     (NEGATIVE)  


 


Urine Urobilinogen     (0-1)  mg/dL


 


Urine Leukocytes     (NEGATIVE)  


 


Urine WBC (Auto)     (0-5)  /HPF


 


Urine RBC (Auto)     (0-2)  /HPF


 


U Epithel Cells (Auto)     (FEW)  /HPF


 


Urine Bacteria (Auto)     (NEGATIVE)  /HPF


 


Urine RBC     (0-5)  Blake/ul


 


Ur Culture Indicated?     


 


Urine Glucose     (NEGATIVE)  mg/dL


 


Urine Opiates Level  NEGATIVE    (NEGATIVE)  


 


Ur Methadone  NEGATIVE    (NEGATIVE)  


 


Urine Barbiturates  NEGATIVE    (NEGATIVE)  


 


Ur Phencyclidine (PCP)  NEGATIVE    (NEGATIVE)  


 


Urine Amphetamine  NEGATIVE    (NEGATIVE)  


 


U Benzodiazepine Level  NEGATIVE    (NEGATIVE)  


 


Urine Cocaine  NEGATIVE    (NEGATIVE)  


 


Urine Marijuana (THC)  NEGATIVE    (NEGATIVE)  


 


Influenza Type A Ag     (NEGATIVE)  


 


Influenza Type B Ag     (NEGATIVE)  


 


RSV (PCR)     (Negative)  


 


SARS-CoV-2 (PCR)     (NEGATIVE)  














  04/29/22 04/30/22 04/30/22 Range/Units





  18:15 05:15 05:30 


 


WBC   5.3   (4.0-10.5)  K/mm3


 


RBC   4.55   (4.1-5.6)  M/mm3


 


Hgb   13.6   (12.5-18.0)  gm/dl


 


Hct   41.6 L   (42-50)  %


 


MCV   91.4   ()  fl


 


MCH   29.9   (26-32)  pg


 


MCHC   32.7   (32-36)  g/dl


 


RDW   16.4 H   (11.5-14.0)  %


 


Plt Count   240   (150-450)  K/mm3


 


MPV   9.2   (7.5-11.0)  fl


 


Gran %   40.6   (36.0-66.0)  %


 


Eos # (Auto)   0.27   (0-0.5)  


 


Absolute Lymphs (auto)   2.01   (1.0-4.6)  


 


Absolute Monos (auto)   0.80   (0.0-1.3)  


 


Lymphocytes %   38.3   (24.0-44.0)  %


 


Monocytes %   15.2 H   (0.0-12.0)  %


 


Eosinophils %   5.1 H   (0.00-5.0)  %


 


Basophils %   0.8   (0.0-0.4)  %


 


Absolute Granulocytes   2.13   (1.4-6.9)  


 


Basophils #   0.04   (0-0.4)  


 


Sodium    136 L  (137-145)  mmol/L


 


Potassium    4.1  (3.5-5.1)  mmol/L


 


Chloride    107  ()  mmol/L


 


Carbon Dioxide    22  (22-30)  mmol/L


 


Anion Gap    11.6  (5-15)  MEQ/L


 


BUN    15  (9-20)  mg/dL


 


Creatinine    0.68  (0.66-1.25)  mg/dL


 


Estimated GFR    > 60.0  ML/MIN


 


Glucose    120 H  ()  mg/dL


 


Lactic Acid     (0.4-2.0)  


 


Calcium    9.2  (8.4-10.2)  mg/dL


 


Total Bilirubin    0.60  (0.2-1.3)  mg/dL


 


AST    27  (17-59)  U/L


 


ALT    12  (0-50)  U/L


 


Alkaline Phosphatase    86  ()  U/L


 


Troponin I  < 0.012    (0.000-0.034)  ng/mL


 


NT-Pro-B Natriuret Pep     (0-900)  pg/mL


 


Serum Total Protein    6.7  (6.3-8.2)  g/dL


 


Albumin    3.2 L  (3.5-5.0)  g/dL


 


Prealbumin     (17.6-36.0)  mg/dL


 


Urinalys Dipstick Clnc     


 


Urine Color     (YELLOW)  


 


Urine Appearance     (CLEAR)  


 


Urine pH     (5-6)  


 


Ur Specific Gravity     (1.005-1.025)  


 


POC Urine Protein Conf     (Negative)  


 


Urine Ketones     (NEGATIVE)  


 


Urine Nitrite     (NEGATIVE)  


 


Urine Bilirubin     (NEGATIVE)  


 


Urine Urobilinogen     (0-1)  mg/dL


 


Urine Leukocytes     (NEGATIVE)  


 


Urine WBC (Auto)     (0-5)  /HPF


 


Urine RBC (Auto)     (0-2)  /HPF


 


U Epithel Cells (Auto)     (FEW)  /HPF


 


Urine Bacteria (Auto)     (NEGATIVE)  /HPF


 


Urine RBC     (0-5)  Blake/ul


 


Ur Culture Indicated?     


 


Urine Glucose     (NEGATIVE)  mg/dL


 


Urine Opiates Level     (NEGATIVE)  


 


Ur Methadone     (NEGATIVE)  


 


Urine Barbiturates     (NEGATIVE)  


 


Ur Phencyclidine (PCP)     (NEGATIVE)  


 


Urine Amphetamine     (NEGATIVE)  


 


U Benzodiazepine Level     (NEGATIVE)  


 


Urine Cocaine     (NEGATIVE)  


 


Urine Marijuana (THC)     (NEGATIVE)  


 


Influenza Type A Ag     (NEGATIVE)  


 


Influenza Type B Ag     (NEGATIVE)  


 


RSV (PCR)     (Negative)  


 


SARS-CoV-2 (PCR)     (NEGATIVE)  








                                  Microbiology











 04/29/22 13:28 Urine Culture - Preliminary





 Urine, Void    <10K NORMAL SKIN FRANTZ





    PROBABLE SKIN CONTAMINANT














- Radiology Impressions


Radiology Exams & Impressions: 


                              Radiology Procedures











 Category Date Time Status


 


 CHEST 1 VIEW (PORTABLE) Stat Exams  04/29/22 12:21 Completed














Assessment/Plan


(1) UTI (urinary tract infection)


Current Visit: Yes   Status: Acute   Code(s): N39.0 - URINARY TRACT INFECTION, 

SITE NOT SPECIFIED   





(2) Failure to thrive in adult


Current Visit: Yes   Status: Acute   





(3) Atrial fibrillation


Current Visit: No   Status: Acute   Code(s): I48.91 - UNSPECIFIED ATRIAL 

FIBRILLATION   





(4) CVA (cerebral infarction)


Current Visit: No   Status: Acute   


Qualifiers: 


 


Code(s): I63.9 - CEREBRAL INFARCTION, UNSPECIFIED

## 2022-05-01 LAB
ALBUMIN SERPL-MCNC: 3.3 G/DL (ref 3.5–5)
ALP SERPL-CCNC: 78 U/L (ref 38–126)
ALT SERPL-CCNC: 11 U/L (ref 0–50)
ANION GAP SERPL CALC-SCNC: 11.4 MEQ/L (ref 5–15)
AST SERPL QL: 27 U/L (ref 17–59)
BASOPHILS # BLD AUTO: 0.02 10*3/UL (ref 0–0.4)
BILIRUB BLD-MCNC: 0.5 MG/DL (ref 0.2–1.3)
BUN SERPL-MCNC: 15 MG/DL (ref 9–20)
CALCIUM SPEC-MCNC: 9 MG/DL (ref 8.4–10.2)
CHLORIDE SERPL-SCNC: 109 MMOL/L (ref 98–107)
CO2 SERPL-SCNC: 20 MMOL/L (ref 22–30)
CREAT SERPL-MCNC: 0.63 MG/DL (ref 0.66–1.25)
EOSINOPHIL # BLD AUTO: 0.29 10*3/UL (ref 0–0.5)
GFR SERPLBLD BASED ON 1.73 SQ M-ARVRAT: > 60 ML/MIN
GLUCOSE SERPL-MCNC: 102 MG/DL (ref 74–106)
HCT VFR BLD AUTO: 38.9 % (ref 42–50)
HGB BLD-MCNC: 12.7 GM/DL (ref 12.5–18)
LYMPHOCYTES # SPEC AUTO: 2.2 10*3/UL (ref 1–4.6)
MCH RBC QN AUTO: 29.9 PG (ref 26–32)
MCHC RBC AUTO-ENTMCNC: 32.6 G/DL (ref 32–36)
MONOCYTES # BLD AUTO: 0.59 10*3/UL (ref 0–1.3)
PLATELET # BLD AUTO: 261 K/MM3 (ref 150–450)
POTASSIUM SERPLBLD-SCNC: 4.1 MMOL/L (ref 3.5–5.1)
PROT SERPL-MCNC: 6.6 G/DL (ref 6.3–8.2)
RBC # BLD AUTO: 4.25 M/MM3 (ref 4.1–5.6)
SODIUM SERPL-SCNC: 136 MMOL/L (ref 137–145)
WBC # BLD AUTO: 5.2 K/MM3 (ref 4–10.5)

## 2022-05-01 RX ADMIN — ATORVASTATIN CALCIUM SCH MG: 40 TABLET, FILM COATED ORAL at 09:34

## 2022-05-01 RX ADMIN — METOPROLOL SUCCINATE SCH MG: 50 TABLET, EXTENDED RELEASE ORAL at 09:32

## 2022-05-01 RX ADMIN — TAMSULOSIN HYDROCHLORIDE SCH MG: 0.4 CAPSULE ORAL at 09:33

## 2022-05-01 RX ADMIN — GEMFIBROZIL SCH MG: 600 TABLET ORAL at 21:16

## 2022-05-01 RX ADMIN — DULOXETINE HYDROCHLORIDE SCH MG: 30 CAPSULE, DELAYED RELEASE ORAL at 09:32

## 2022-05-01 RX ADMIN — APIXABAN SCH MG: 2.5 TABLET, FILM COATED ORAL at 21:16

## 2022-05-01 RX ADMIN — PANTOPRAZOLE SODIUM SCH MG: 40 TABLET, DELAYED RELEASE ORAL at 09:31

## 2022-05-01 RX ADMIN — MIRTAZAPINE SCH MG: 15 TABLET, FILM COATED ORAL at 09:33

## 2022-05-01 RX ADMIN — ASPIRIN SCH MG: 81 TABLET, COATED ORAL at 09:32

## 2022-05-01 RX ADMIN — APIXABAN SCH MG: 2.5 TABLET, FILM COATED ORAL at 09:32

## 2022-05-01 RX ADMIN — LEVOFLOXACIN SCH MLS/HR: 5 INJECTION, SOLUTION INTRAVENOUS at 09:35

## 2022-05-01 RX ADMIN — CYANOCOBALAMIN TAB 500 MCG SCH MCG: 500 TAB at 09:32

## 2022-05-01 RX ADMIN — GEMFIBROZIL SCH MG: 600 TABLET ORAL at 09:34

## 2022-05-01 RX ADMIN — BACLOFEN PRN MG: 10 TABLET ORAL at 17:51

## 2022-05-01 NOTE — PCM.NOTE
Date and Time: 05/01/22  1103





Subjective Assessment: 





doing ok





- Review of Systems


Constitutional: No Fever, No Chills


Eyes: No Symptoms


Ears, Nose, & Throat: No Symptoms


Respiratory: No Cough, No Short Of Breath


Cardiac: No Chest Pain, No Edema, No Syncope


Abdominal/Gastrointestinal: No Abdominal Pain, No Nausea, No Vomiting, No 

Diarrhea


Genitourinary Symptoms: No Dysuria


Musculoskeletal: No Back Pain, No Neck Pain


Skin: No Rash


Neurological: No Dizziness, No Focal Weakness, No Sensory Changes


Psychological: No Symptoms


Endocrine: No Symptoms


Hematologic/Lymphatic: No Symptoms


Immunological/Allergic: No Symptoms





Objective Exam


General Appearance: no apparent distress, alert


Neurologic Exam: alert, oriented x 3, cooperative, normal mood/affect, nml 

cerebellar function, sensation nml, No motor deficits


Skin Exam: normal color, warm, dry


Eye Exam: PERRL, EOMI, eyes nml inspection


Ears, Nose, Throat Exam: normal ENT inspection, pharynx normal, moist mucous 

membranes


Neck Exam: normal inspection, non-tender, supple, full range of motion


Respiratory Exam: normal breath sounds, lungs clear, No respiratory distress


Cardiovascular Exam: regular rate/rhythm, normal heart sounds


Gastrointestinal/Abdomen Exam: soft, No tenderness, No mass


Extremity Exam: normal inspection, normal range of motion


Back Exam: normal inspection, normal range of motion, No CVA tenderness, No 

vertebral tenderness


Male Genitalia Exam: deferred


Rectal Exam: deferred





OBJECTIVE DATA


Vital Signs: 


                               Vital Signs - 24 hr











  Temp Pulse Resp BP Pulse Ox


 


 05/01/22 07:54  98.6 F  84  16  97/69  95


 


 05/01/22 04:00  98.4 F  85  16  118/72  97


 


 04/30/22 23:40  97.8 F  92 H  20  106/68  99


 


 04/30/22 19:32  97.5 F  86  15  87/57 


 


 04/30/22 15:10  98 F  91 H  16  89/59  93 L


 


 04/30/22 11:48  98.2 F  94 H  16  85/60  95








                        Pain Assessment - Last Documented











Pain Intensity                 0











Intake and Output: 


                                 Intake & Output











 04/28/22 04/29/22 04/30/22 05/01/22





 11:59 11:59 11:59 11:59


 


Intake Total   1002 2574


 


Balance   1002 2574


 


Weight   103.8 kg 











Lab Results: 


                            Lab Results-Last 24 Hours











  05/01/22 05/01/22 Range/Units





  05:35 05:35 


 


WBC  5.2   (4.0-10.5)  K/mm3


 


RBC  4.25   (4.1-5.6)  M/mm3


 


Hgb  12.7   (12.5-18.0)  gm/dl


 


Hct  38.9 L   (42-50)  %


 


MCV  91.5   ()  fl


 


MCH  29.9   (26-32)  pg


 


MCHC  32.6   (32-36)  g/dl


 


RDW  16.0 H   (11.5-14.0)  %


 


Plt Count  261   (150-450)  K/mm3


 


MPV  10.0   (7.5-11.0)  fl


 


Gran %  40.1   (36.0-66.0)  %


 


Eos # (Auto)  0.29   (0-0.5)  


 


Absolute Lymphs (auto)  2.20   (1.0-4.6)  


 


Absolute Monos (auto)  0.59   (0.0-1.3)  


 


Lymphocytes %  42.5   (24.0-44.0)  %


 


Monocytes %  11.4   (0.0-12.0)  %


 


Eosinophils %  5.6 H   (0.00-5.0)  %


 


Basophils %  0.4   (0.0-0.4)  %


 


Absolute Granulocytes  2.08   (1.4-6.9)  


 


Basophils #  0.02   (0-0.4)  


 


Sodium   136 L  (137-145)  mmol/L


 


Potassium   4.1  (3.5-5.1)  mmol/L


 


Chloride   109 H  ()  mmol/L


 


Carbon Dioxide   20 L  (22-30)  mmol/L


 


Anion Gap   11.4  (5-15)  MEQ/L


 


BUN   15  (9-20)  mg/dL


 


Creatinine   0.63 L  (0.66-1.25)  mg/dL


 


Estimated GFR   > 60.0  ML/MIN


 


Glucose   102  ()  mg/dL


 


Calcium   9.0  (8.4-10.2)  mg/dL


 


Total Bilirubin   0.50  (0.2-1.3)  mg/dL


 


AST   27  (17-59)  U/L


 


ALT   11  (0-50)  U/L


 


Alkaline Phosphatase   78  ()  U/L


 


Serum Total Protein   6.6  (6.3-8.2)  g/dL


 


Albumin   3.3 L  (3.5-5.0)  g/dL











Radiology Exams: 


                              Radiology Procedures











 Category Date Time Status


 


 CHEST 1 VIEW (PORTABLE) Stat Exams  04/29/22 12:21 Completed














Assessment/Plan


(1) UTI (urinary tract infection)


Current Visit: Yes   Status: Acute   


Qualifiers: 


   Urinary tract infection type: acute pyelonephritis   Qualified Code(s): N10 -

Acute pyelonephritis   


Code(s): N39.0 - URINARY TRACT INFECTION, SITE NOT SPECIFIED   





(2) Failure to thrive in adult


Current Visit: Yes   Status: Chronic   





(3) Atrial fibrillation


Current Visit: No   Status: Chronic   


Qualifiers: 


   Atrial fibrillation type: unspecified   Qualified Code(s): I48.91 - 

Unspecified atrial fibrillation   


Code(s): I48.91 - UNSPECIFIED ATRIAL FIBRILLATION   





(4) CVA (cerebral infarction)


Current Visit: No   Status: Acute   


Qualifiers: 


 


Code(s): I63.9 - CEREBRAL INFARCTION, UNSPECIFIED

## 2022-05-02 RX ADMIN — APIXABAN SCH MG: 2.5 TABLET, FILM COATED ORAL at 21:56

## 2022-05-02 RX ADMIN — DULOXETINE HYDROCHLORIDE SCH MG: 30 CAPSULE, DELAYED RELEASE ORAL at 09:19

## 2022-05-02 RX ADMIN — GEMFIBROZIL SCH MG: 600 TABLET ORAL at 21:56

## 2022-05-02 RX ADMIN — TAMSULOSIN HYDROCHLORIDE SCH MG: 0.4 CAPSULE ORAL at 09:19

## 2022-05-02 RX ADMIN — CYANOCOBALAMIN TAB 500 MCG SCH MCG: 500 TAB at 09:19

## 2022-05-02 RX ADMIN — LEVOFLOXACIN SCH MLS/HR: 5 INJECTION, SOLUTION INTRAVENOUS at 09:21

## 2022-05-02 RX ADMIN — ATORVASTATIN CALCIUM SCH MG: 40 TABLET, FILM COATED ORAL at 09:20

## 2022-05-02 RX ADMIN — GEMFIBROZIL SCH MG: 600 TABLET ORAL at 09:20

## 2022-05-02 RX ADMIN — APIXABAN SCH MG: 2.5 TABLET, FILM COATED ORAL at 09:19

## 2022-05-02 RX ADMIN — METOPROLOL SUCCINATE SCH MG: 50 TABLET, EXTENDED RELEASE ORAL at 09:19

## 2022-05-02 RX ADMIN — PANTOPRAZOLE SODIUM SCH MG: 40 TABLET, DELAYED RELEASE ORAL at 09:19

## 2022-05-02 RX ADMIN — ASPIRIN SCH MG: 81 TABLET, COATED ORAL at 09:18

## 2022-05-02 RX ADMIN — MIRTAZAPINE SCH MG: 15 TABLET, FILM COATED ORAL at 09:19

## 2022-05-02 RX ADMIN — BACLOFEN PRN MG: 10 TABLET ORAL at 21:59

## 2022-05-02 NOTE — PCM.NOTE
Date and Time: 05/02/22  1319





Subjective Assessment: 





Patient denies new complaints other than a little indigestion. Is eating and 

drinking well. PT worked with patient today and was 2 person heavy assist plus 

gait belt . This is chronic per Hx due to CVA. He wants to be admitted to 

[facility]. We are awaiting Insurance approval. Patient is on IV 

Levaquin for pneumonia and UTI. Denies cough or dyspnea.





Objective Exam


General Appearance: no apparent distress


Neurologic Exam: alert, oriented x 3, normal mood/affect


Skin Exam: warm, dry


Respiratory Exam: diminished breath sounds (bases)


Cardiovascular Exam: irregular (rate 80s)


Gastrointestinal/Abdomen Exam: soft, normal bowel sounds, tenderness (mildly 

tender epigastrum,no guarding)





OBJECTIVE DATA


Vital Signs: 


                               Vital Signs - 24 hr











  Temp Pulse Resp BP Pulse Ox


 


 05/02/22 11:32  97.9 F  88  16  100/64  97


 


 05/02/22 07:22  97.9 F  93 H  16  122/73  93 L


 


 05/02/22 04:00  97.9 F  80  16  118/60  95


 


 05/02/22 00:00  98.0 F  79  16  128/61  94 L


 


 05/01/22 19:57  97.9 F  90  16  142/57  94 L


 


 05/01/22 16:00  97.8 F  104 H  16  88/54  95








                        Pain Assessment - Last Documented











Pain Intensity                 3











Intake and Output: 


                                 Intake & Output











 04/30/22 05/01/22 05/02/22 05/03/22





 11:59 11:59 11:59 11:59


 


Intake Total 1002 2574 4080 680


 


Balance 1002 2574 4080 680


 


Weight 103.8 kg   











Lab Results: 


                            Lab Results-Last 24 Hours











  05/01/22 Range/Units





  21:27 


 


POC Glucometer  156 H  (74 to 106)  mg/dL











Multi-Disciplinary Progress Notes: 


                        Multi-Disciplinary Progress Notes





05/02/22 10:16 Case Management Note by Yvonne Johnson


REFERRAL FAXED TO [facility]- HE IS AWARE OF REFERRAL





Initialized on 05/02/22 10:16 - END OF NOTE








05/02/22 10:15 Case Management Note by Yvonne Johnson


 FROM [facility] CALLED FOR UPDATE. SHE WILL NEED NOTIFIED AT TIME

OF DC [phone]





Initialized on 05/02/22 10:15 - END OF NOTE

















Assessment/Plan


(1) Pneumonia


Status: Acute   


Qualifiers: 


   Pneumonia type: due to unspecified organism 


Assessment & Plan: 


clinically improving on Levaquin


Code(s): J18.9 - PNEUMONIA, UNSPECIFIED ORGANISM   





(2) UTI (urinary tract infection)


Status: Acute   


Qualifiers: 


   Urinary tract infection type: acute cystitis   Hematuria presence: without 

hematuria   Qualified Code(s): N30.00 - Acute cystitis without hematuria   


Assessment & Plan: 


clinically improved on Levaquin


Code(s): N39.0 - URINARY TRACT INFECTION, SITE NOT SPECIFIED   





(3) Indigestion


Status: Acute   


Assessment & Plan: 


patient states Tums works,vo given for Tums


Code(s): K30 - FUNCTIONAL DYSPEPSIA

## 2022-05-03 RX ADMIN — ATORVASTATIN CALCIUM SCH MG: 40 TABLET, FILM COATED ORAL at 09:30

## 2022-05-03 RX ADMIN — TAMSULOSIN HYDROCHLORIDE SCH MG: 0.4 CAPSULE ORAL at 09:27

## 2022-05-03 RX ADMIN — APIXABAN SCH MG: 2.5 TABLET, FILM COATED ORAL at 22:33

## 2022-05-03 RX ADMIN — LEVOFLOXACIN SCH MLS/HR: 5 INJECTION, SOLUTION INTRAVENOUS at 10:30

## 2022-05-03 RX ADMIN — BACLOFEN PRN MG: 10 TABLET ORAL at 22:33

## 2022-05-03 RX ADMIN — METOPROLOL SUCCINATE SCH MG: 50 TABLET, EXTENDED RELEASE ORAL at 09:25

## 2022-05-03 RX ADMIN — DULOXETINE HYDROCHLORIDE SCH MG: 30 CAPSULE, DELAYED RELEASE ORAL at 09:25

## 2022-05-03 RX ADMIN — MIRTAZAPINE SCH MG: 15 TABLET, FILM COATED ORAL at 09:31

## 2022-05-03 RX ADMIN — APIXABAN SCH MG: 2.5 TABLET, FILM COATED ORAL at 09:26

## 2022-05-03 RX ADMIN — ASPIRIN SCH MG: 81 TABLET, COATED ORAL at 09:26

## 2022-05-03 RX ADMIN — GEMFIBROZIL SCH MG: 600 TABLET ORAL at 22:33

## 2022-05-03 RX ADMIN — CYANOCOBALAMIN TAB 500 MCG SCH MCG: 500 TAB at 09:25

## 2022-05-03 RX ADMIN — PANTOPRAZOLE SODIUM SCH MG: 40 TABLET, DELAYED RELEASE ORAL at 09:29

## 2022-05-03 RX ADMIN — GEMFIBROZIL SCH MG: 600 TABLET ORAL at 09:32

## 2022-05-03 NOTE — XRAY
Indication: Pneumonia.



Comparison: April 29, 2022.



Portable chest demonstrates grossly stable right mid to lower lung

infiltrate/atelectasis with moderate effusion.  Remaining heart and left lung

unremarkable.  New electronic device overlies right midlung.

## 2022-05-04 VITALS — DIASTOLIC BLOOD PRESSURE: 66 MMHG | OXYGEN SATURATION: 96 % | HEART RATE: 85 BPM | SYSTOLIC BLOOD PRESSURE: 109 MMHG

## 2022-05-04 LAB
ALBUMIN SERPL-MCNC: 3.2 G/DL (ref 3.5–5)
ALP SERPL-CCNC: 87 U/L (ref 38–126)
ALT SERPL-CCNC: 9 U/L (ref 0–50)
ANION GAP SERPL CALC-SCNC: 12.5 MEQ/L (ref 5–15)
AST SERPL QL: 26 U/L (ref 17–59)
BASOPHILS # BLD AUTO: 0.03 10*3/UL (ref 0–0.4)
BILIRUB BLD-MCNC: 0.4 MG/DL (ref 0.2–1.3)
BUN SERPL-MCNC: 17 MG/DL (ref 9–20)
CALCIUM SPEC-MCNC: 9 MG/DL (ref 8.4–10.2)
CHLORIDE SERPL-SCNC: 108 MMOL/L (ref 98–107)
CO2 SERPL-SCNC: 22 MMOL/L (ref 22–30)
CREAT SERPL-MCNC: 0.72 MG/DL (ref 0.66–1.25)
EOSINOPHIL # BLD AUTO: 0.3 10*3/UL (ref 0–0.5)
GFR SERPLBLD BASED ON 1.73 SQ M-ARVRAT: > 60 ML/MIN
GLUCOSE SERPL-MCNC: 138 MG/DL (ref 74–106)
HCT VFR BLD AUTO: 38.6 % (ref 42–50)
HGB BLD-MCNC: 12.5 GM/DL (ref 12.5–18)
LYMPHOCYTES # SPEC AUTO: 2.82 10*3/UL (ref 1–4.6)
MCH RBC QN AUTO: 29.6 PG (ref 26–32)
MCHC RBC AUTO-ENTMCNC: 32.4 G/DL (ref 32–36)
MONOCYTES # BLD AUTO: 0.75 10*3/UL (ref 0–1.3)
PLATELET # BLD AUTO: 302 K/MM3 (ref 150–450)
POTASSIUM SERPLBLD-SCNC: 3.7 MMOL/L (ref 3.5–5.1)
PROT SERPL-MCNC: 6.5 G/DL (ref 6.3–8.2)
RBC # BLD AUTO: 4.23 M/MM3 (ref 4.1–5.6)
SODIUM SERPL-SCNC: 138 MMOL/L (ref 137–145)
WBC # BLD AUTO: 7 K/MM3 (ref 4–10.5)

## 2022-05-04 RX ADMIN — ASPIRIN SCH MG: 81 TABLET, COATED ORAL at 09:14

## 2022-05-04 RX ADMIN — DULOXETINE HYDROCHLORIDE SCH MG: 30 CAPSULE, DELAYED RELEASE ORAL at 09:14

## 2022-05-04 RX ADMIN — TAMSULOSIN HYDROCHLORIDE SCH MG: 0.4 CAPSULE ORAL at 09:15

## 2022-05-04 RX ADMIN — GEMFIBROZIL SCH MG: 600 TABLET ORAL at 09:14

## 2022-05-04 RX ADMIN — MIRTAZAPINE SCH MG: 15 TABLET, FILM COATED ORAL at 09:15

## 2022-05-04 RX ADMIN — LEVOFLOXACIN SCH: 5 INJECTION, SOLUTION INTRAVENOUS at 09:15

## 2022-05-04 RX ADMIN — ATORVASTATIN CALCIUM SCH MG: 40 TABLET, FILM COATED ORAL at 09:15

## 2022-05-04 RX ADMIN — APIXABAN SCH MG: 2.5 TABLET, FILM COATED ORAL at 09:14

## 2022-05-04 RX ADMIN — CYANOCOBALAMIN TAB 500 MCG SCH MCG: 500 TAB at 09:14

## 2022-05-04 RX ADMIN — METOPROLOL SUCCINATE SCH MG: 50 TABLET, EXTENDED RELEASE ORAL at 09:14

## 2022-05-04 RX ADMIN — PANTOPRAZOLE SODIUM SCH MG: 40 TABLET, DELAYED RELEASE ORAL at 09:14

## 2023-06-30 ENCOUNTER — HOSPITAL ENCOUNTER (OUTPATIENT)
Dept: HOSPITAL 33 - SDC | Age: 63
Discharge: NURSING, REHAB OR LONG-TERM CARE | End: 2023-06-30
Attending: FAMILY MEDICINE
Payer: MEDICARE

## 2023-06-30 VITALS — HEART RATE: 69 BPM | SYSTOLIC BLOOD PRESSURE: 132 MMHG | DIASTOLIC BLOOD PRESSURE: 89 MMHG

## 2023-06-30 VITALS — OXYGEN SATURATION: 97 %

## 2023-06-30 DIAGNOSIS — E11.9: ICD-10-CM

## 2023-06-30 DIAGNOSIS — D12.2: ICD-10-CM

## 2023-06-30 DIAGNOSIS — Z12.11: Primary | ICD-10-CM

## 2023-06-30 PROCEDURE — 82947 ASSAY GLUCOSE BLOOD QUANT: CPT

## 2023-06-30 NOTE — OP
SURGERY DATE/TIME:  06/30/2023  0805    



PREOPERATIVE DIAGNOSIS:      Screening exam. 



POSTOPERATIVE DIAGNOSIS:      Small ascending colon polyp.



PROCEDURE:    Colonoscopy with cold forceps biopsy.



SURGEON:        Dr. Dowd.



ANESTHESIA:    MAC.  Medications given by anesthesia department. 



HISTORY:  The patient is a 62-year-old white male patient presenting now for screening 
colonoscopy. The patient reports that he had an examination previously but could not tell 
me exactly how long it had been. It is important to note that the patient has had two to 
three previous strokes and has expressive aphasia. He was appraised of the risks of the 
procedure including the risk of perforation, phlebitis, untoward reaction to medication, 
bleeding and missed lesions. The patient verbalized his understanding and desired to have 
the procedure performed.



DESCRIPTION OF PROCEDURE:  The patient was given the medications by the anesthesia 
department.   He had continuous pulse oximetry, ECG monitoring and intermittent blood 
pressure monitoring during the examination. He was placed in the left lateral decubitus 
position. A digital rectal examination was performed and revealed normal anal sphincter 
tone and no masses and normal prostate. The flexible Olympus pediatric colonoscope was 
used to intubate the rectum. A view of the colon was developed sequentially to the cecum. 
Upon insertion and withdrawal, including a retroflex view in the rectum was noted one 
small polyp measuring approximately 0.5 cm in size in the ascending colon and this is 
destroyed and biopsied using pass of the cold forceps biopsy instrument. With no other 
mucosal lesions were encountered, the scope was removed from the patient who tolerated the 
procedure well and was sent back to OP recovery in good condition. The prep was noted to 
be fair.